# Patient Record
Sex: MALE | Employment: OTHER | URBAN - METROPOLITAN AREA
[De-identification: names, ages, dates, MRNs, and addresses within clinical notes are randomized per-mention and may not be internally consistent; named-entity substitution may affect disease eponyms.]

---

## 2017-05-19 ENCOUNTER — TELEPHONE (OUTPATIENT)
Dept: INTERNAL MEDICINE | Facility: CLINIC | Age: 72
End: 2017-05-19

## 2017-05-19 NOTE — TELEPHONE ENCOUNTER
5/19/2017    Call Regarding Preventive Health Screening Colonoscopy    Attempt 1    Message on voicemail     Comments:       Outreach   cnt

## 2017-05-23 ENCOUNTER — TELEPHONE (OUTPATIENT)
Dept: INTERNAL MEDICINE | Facility: CLINIC | Age: 72
End: 2017-05-23

## 2017-05-23 NOTE — TELEPHONE ENCOUNTER
Panel Management Review      Patient has the following on his problem list:     Diabetes    ASA: Passed    Last A1C  Lab Results   Component Value Date    A1C 8.5 12/14/2016    A1C 8.0 05/25/2016    A1C 8.3 02/16/2016    A1C 7.9 12/23/2014    A1C 8.4 05/17/2014     A1C tested: FAILED    Last LDL:    Lab Results   Component Value Date    CHOL 133 02/16/2016     Lab Results   Component Value Date    HDL 33 02/16/2016     Lab Results   Component Value Date    LDL 72 02/16/2016     Lab Results   Component Value Date    TRIG 142 02/16/2016     Lab Results   Component Value Date    CHOLHDLRATIO 4.4 11/25/2005     Lab Results   Component Value Date    NHDL 100 02/16/2016       Is the patient on a Statin? YES             Is the patient on Aspirin? YES    Medications     HMG CoA Reductase Inhibitors    atorvastatin (LIPITOR) 40 MG tablet    Salicylates    aspirin EC 81 MG EC tablet          Last three blood pressure readings:  BP Readings from Last 3 Encounters:   12/14/16 122/70   09/09/16 120/84   06/15/16 126/76       Date of last diabetes office visit: 12/14/16     Tobacco History:     History   Smoking Status     Never Smoker   Smokeless Tobacco     Never Used         Hypertension   Last three blood pressure readings:  BP Readings from Last 3 Encounters:   12/14/16 122/70   09/09/16 120/84   06/15/16 126/76     Blood pressure: Passed    HTN Guidelines:  Age 18-59 BP range:  Less than 140/90  Age 60-85 with Diabetes:  Less than 140/90  Age 60-85 without Diabetes:  less than 150/90      Composite cancer screening  Chart review shows that this patient is due/due soon for the following Colonoscopy  Summary:    Patient is due/failing the following:   A1C, BP CHECK and COLONOSCOPY    Action needed:   Patient needs office visit for Diabetes, BP check.    Type of outreach:    Sent letter. Phone number non Available, this is the second attempt ( another encounter previously for Panel Management)     Questions for provider  review:    None                                                                                                                                    Antonina Gold MA       Chart routed to none .

## 2017-05-23 NOTE — LETTER
[facility]  [address], [address]  [place]  [address]      May 23, 2017    Adal Bates                                                                                                                                                       [address] [facility] [id number]            Dear Adal,        At [facility] we care about your health and well-being. In order to ensure we are providing the best quality care, we have reviewed your chart and see that you are due for:    1. Diabetes, blood pressure and medication recheck   2. Colonoscopy       Please call [phone] to schedule an appointment      GI : [phone] to schedule Colonoscopy             Sincerely,      Alvaro Lindo M.D.

## 2017-11-30 DIAGNOSIS — I25.5 ISCHEMIC CARDIOMYOPATHY: ICD-10-CM

## 2017-11-30 DIAGNOSIS — Z95.1 S/P CABG (CORONARY ARTERY BYPASS GRAFT): ICD-10-CM

## 2017-12-04 RX ORDER — LOSARTAN POTASSIUM 50 MG/1
TABLET ORAL
Qty: 90 TABLET | Refills: 0 | Status: SHIPPED | OUTPATIENT
Start: 2017-12-04 | End: 2018-03-04

## 2017-12-04 RX ORDER — ATORVASTATIN CALCIUM 40 MG/1
TABLET, FILM COATED ORAL
Qty: 90 TABLET | Refills: 0 | Status: SHIPPED | OUTPATIENT
Start: 2017-12-04 | End: 2018-03-04

## 2017-12-04 RX ORDER — METOPROLOL SUCCINATE 25 MG/1
TABLET, EXTENDED RELEASE ORAL
Qty: 270 TABLET | Refills: 0 | Status: SHIPPED | OUTPATIENT
Start: 2017-12-04 | End: 2018-03-04

## 2017-12-09 DIAGNOSIS — E11.8 TYPE 2 DIABETES MELLITUS WITH COMPLICATION, WITHOUT LONG-TERM CURRENT USE OF INSULIN (H): ICD-10-CM

## 2017-12-12 RX ORDER — GLIPIZIDE 10 MG/1
TABLET, FILM COATED, EXTENDED RELEASE ORAL
Qty: 90 TABLET | Refills: 0 | Status: SHIPPED | OUTPATIENT
Start: 2017-12-12 | End: 2018-03-11

## 2017-12-12 RX ORDER — METFORMIN HCL 500 MG
TABLET, EXTENDED RELEASE 24 HR ORAL
Qty: 360 TABLET | Refills: 0 | Status: SHIPPED | OUTPATIENT
Start: 2017-12-12 | End: 2018-03-11

## 2017-12-13 ENCOUNTER — OFFICE VISIT (OUTPATIENT)
Dept: INTERNAL MEDICINE | Facility: CLINIC | Age: 72
End: 2017-12-13
Payer: MEDICARE

## 2017-12-13 VITALS
HEIGHT: 70 IN | BODY MASS INDEX: 23.62 KG/M2 | SYSTOLIC BLOOD PRESSURE: 128 MMHG | WEIGHT: 165 LBS | TEMPERATURE: 67 F | HEART RATE: 67 BPM | DIASTOLIC BLOOD PRESSURE: 78 MMHG | OXYGEN SATURATION: 97 %

## 2017-12-13 DIAGNOSIS — Z12.11 SPECIAL SCREENING FOR MALIGNANT NEOPLASMS, COLON: ICD-10-CM

## 2017-12-13 DIAGNOSIS — Z12.5 SCREENING FOR PROSTATE CANCER: ICD-10-CM

## 2017-12-13 DIAGNOSIS — E11.9 TYPE 2 DIABETES MELLITUS WITHOUT COMPLICATION, WITHOUT LONG-TERM CURRENT USE OF INSULIN (H): ICD-10-CM

## 2017-12-13 DIAGNOSIS — I25.10 CORONARY ARTERY DISEASE INVOLVING NATIVE CORONARY ARTERY OF NATIVE HEART WITHOUT ANGINA PECTORIS: ICD-10-CM

## 2017-12-13 DIAGNOSIS — Z00.00 ROUTINE GENERAL MEDICAL EXAMINATION AT A HEALTH CARE FACILITY: Primary | ICD-10-CM

## 2017-12-13 DIAGNOSIS — E78.5 HYPERLIPIDEMIA WITH TARGET LDL LESS THAN 70: ICD-10-CM

## 2017-12-13 PROCEDURE — G0439 PPPS, SUBSEQ VISIT: HCPCS | Performed by: INTERNAL MEDICINE

## 2017-12-13 NOTE — MR AVS SNAPSHOT
After Visit Summary   12/13/2017    Adal Bates    MRN: [id number]           Patient Information     Date Of Birth          1945        Visit Information        Provider Department      12/13/2017 3:40 PM Alvaro Lindo MD [facility]        Today's Diagnoses     Routine general medical examination at a health care facility    -  1    Special screening for malignant neoplasms, colon        Screening for prostate cancer        Type 2 diabetes mellitus without complication, without long-term current use of insulin (H)        Hyperlipidemia with target LDL less than 70        Coronary artery disease involving native coronary artery of native heart without angina pectoris          Care Instructions      Preventive Health Recommendations:   Male Ages 65 and over    Yearly exam:             See your health care provider every year in order to  o   Review health changes.   o   Discuss preventive care.    o   Review your medicines if your doctor has prescribed any.    Talk with your health care provider about whether you should have a test to screen for prostate cancer (PSA).    Every 3 years, have a diabetes test (fasting glucose). If you are at risk for diabetes, you should have this test more often.    Every 5 years, have a cholesterol test. Have this test more often if you are at risk for high cholesterol or heart disease.     Every 10 years, have a colonoscopy. Or, have a yearly FIT test (stool test). These exams will check for colon cancer.    Talk to with your health care provider about screening for Abdominal Aortic Aneurysm if you have a family history of AAA or have a history of smoking.    Shots:     Get a flu shot each year.     Get a tetanus shot every 10 years.     Talk to your doctor about your pneumonia vaccines. There are now two you should receive - Pneumovax (PPSV 23) and Prevnar (PCV 13).     Talk to your doctor about a shingles vaccine.     Talk to your doctor  about the hepatitis B vaccine.  Nutrition:     Eat at least 5 servings of fruits and vegetables each day.     Eat whole-grain bread, whole-wheat pasta and brown rice instead of white grains and rice.     Talk to your provider about Calcium and Vitamin D.   Lifestyle    Exercise for at least 150 minutes a week (30 minutes a day, 5 days a week). This will help you control your weight and prevent disease.     Limit alcohol to one drink per day.     No smoking.     Wear sunscreen to prevent skin cancer.     See your dentist every six months for an exam and cleaning.     See your eye doctor every 1 to 2 years to screen for conditions such as glaucoma, macular degeneration, cataracts, etc   Preventive Health Recommendations:       Male Ages 65 and over    Yearly exam:             See your health care provider every year in order to  o   Review health changes.   o   Discuss preventive care.    o   Review your medicines if your doctor has prescribed any.  Talk with your health care provider about whether you should have a test to screen for prostate cancer (PSA).  Every 3 years, have a diabetes test (fasting glucose). If you are at risk for diabetes, you should have this test more often.  Every 5 years, have a cholesterol test. Have this test more often if you are at risk for high cholesterol or heart disease.   Every 10 years, have a colonoscopy. Or, have a yearly FIT test (stool test). These exams will check for colon cancer.  Talk to with your health care provider about screening for Abdominal Aortic Aneurysm if you have a family history of AAA or have a history of smoking.  Shots:   Get a flu shot each year.   Get a tetanus shot every 10 years.   Talk to your doctor about your pneumonia vaccines. There are now two you should receive - Pneumovax (PPSV 23) and Prevnar (PCV 13).  Talk to your doctor about a shingles vaccine.   Talk to your doctor about the hepatitis B vaccine.  Nutrition:   Eat at least 5 servings of  fruits and vegetables each day.   Eat whole-grain bread, whole-wheat pasta and brown rice instead of white grains and rice.   Talk to your doctor about Calcium and Vitamin D.   Lifestyle  Exercise for at least 150 minutes a week (30 minutes a day, 5 days a week). This will help you control your weight and prevent disease.   Limit alcohol to one drink per day.   No smoking.   Wear sunscreen to prevent skin cancer.   See your dentist every six months for an exam and cleaning.   See your eye doctor every 1 to 2 years to screen for conditions such as glaucoma, macular degeneration and cataracts.          Follow-ups after your visit        Additional Services     GASTROENTEROLOGY ADULT REF PROCEDURE ONLY       Last Lab Result: Creatinine (mg/dL)       Date                     Value                 12/14/2016               1.29 (H)         ----------  Body mass index is 23.68 kg/(m^2).     Needed:  No  Language:  English    Patient will be contacted to schedule procedure.     Please be aware that coverage of these services is subject to the terms and limitations of your health insurance plan.  Call member services at your health plan with any benefit or coverage questions.  Any procedures must be performed at a [facility] facility OR coordinated by your clinic's referral office.    Please bring the following with you to your appointment:    (1) Any X-Rays, CTs or MRIs which have been performed.  Contact the facility where they were done to arrange for  prior to your scheduled appointment.    (2) List of current medications   (3) This referral request   (4) Any documents/labs given to you for this referral                  Future tests that were ordered for you today     Open Future Orders        Priority Expected Expires Ordered    CBC with platelets Routine  12/31/2017 12/13/2017    Comprehensive metabolic panel Routine  12/31/2017 12/13/2017    Lipid panel reflex to direct LDL Fasting Routine  12/31/2017  "2017    TSH with free T4 reflex Routine  2017    Prostate spec antigen screen Routine  2017    *UA reflex to Microscopic Routine  2017    Hemoglobin A1c Routine  2017    Albumin Random Urine Quantitative with Creat Ratio Routine  2017            Who to contact     If you have questions or need follow up information about today's clinic visit or your schedule please contact [facility] directly at [phone].  Normal or non-critical lab and imaging results will be communicated to you by Backplanehart, letter or phone within 4 business days after the clinic has received the results. If you do not hear from us within 7 days, please contact the clinic through [employer] or phone. If you have a critical or abnormal lab result, we will notify you by phone as soon as possible.  Submit refill requests through LSEO or call your pharmacy and they will forward the refill request to us. Please allow 3 business days for your refill to be completed.          Additional Information About Your Visit        Backplanehart Information     LSEO lets you send messages to your doctor, view your test results, renew your prescriptions, schedule appointments and more. To sign up, go to [email or web address] . Click on \"Log in\" on the left side of the screen, which will take you to the Welcome page. Then click on \"Sign up Now\" on the right side of the page.     You will be asked to enter the access code listed below, as well as some personal information. Please follow the directions to create your username and password.     Your access code is: [email or web address]  Expires: 2017  9:13 AM     Your access code will  in 90 days. If you need help or a new code, please call your [facility] or [phone].        Care EveryWhere ID     This is your Care EveryWhere ID. This could be used by other organizations to access your [facility] " "medical records  [id number]        Your Vitals Were     Pulse Temperature Height Pulse Oximetry BMI (Body Mass Index)       67 67  F (19.4  C) (Oral) 5' 10\" (1.778 m) 97% 23.68 kg/m2        Blood Pressure from Last 3 Encounters:   12/13/17 128/78   12/14/16 122/70   09/09/16 120/84    Weight from Last 3 Encounters:   12/13/17 165 lb (74.8 kg)   12/14/16 164 lb (74.4 kg)   09/09/16 164 lb 4.8 oz (74.5 kg)              We Performed the Following     GASTROENTEROLOGY ADULT REF PROCEDURE ONLY        Primary Care Provider Office Phone # Fax #    Alvaro Lindo [phone]       [address] [facility] [address]        Equal Access to Services     [facility]: Hadii aad ku hadasho Soomaali, waaxda luqadaha, qaybta kaalmada adeegyada, german mcraein hayaan adewil dia . So [facility] [phone].    ATENCIÓN: Si habla español, tiene a nolasco disposición servicios gratuitos de asistencia lingüística. Llame al [phone].    We comply with applicable federal civil rights laws and [place] laws. We do not discriminate on the basis of race, color, national origin, age, disability, sex, sexual orientation, or gender identity.            Thank you!     Thank you for choosing [facility]  for your care. Our goal is always to provide you with excellent care. Hearing back from our patients is one way we can continue to improve our services. Please take a few minutes to complete the written survey that you may receive in the mail after your visit with us. Thank you!             Your Updated Medication List - Protect others around you: Learn how to safely use, store and throw away your medicines at www.disposemymeds.org.          This list is accurate as of: 12/13/17  4:18 PM.  Always use your most recent med list.                   Brand Name Dispense Instructions for use Diagnosis    ACCU-CHEK COMPLETE Kit     1 kit    1 kit daily    Type 2 diabetes, HbA1C goal < 8% (H)       aspirin " 81 MG EC tablet     90 tablet    Take 1 tablet (81 mg) by mouth daily    S/P CABG (coronary artery bypass graft)       atorvastatin 40 MG tablet    LIPITOR    90 tablet    TAKE 1 TABLET DAILY    Ischemic cardiomyopathy       COENZYME Q-10 PO      Take 200 mg by mouth daily        glipiZIDE 10 MG 24 hr tablet    GLUCOTROL XL    90 tablet    TAKE 1 TABLET DAILY    Type 2 diabetes mellitus with complication, without long-term current use of insulin (H)       losartan 50 MG tablet    COZAAR    90 tablet    TAKE 1 TABLET DAILY    S/P CABG (coronary artery bypass graft)       metFORMIN 500 MG 24 hr tablet    GLUCOPHAGE-XR    360 tablet    TAKE 2 TABLETS TWICE A DAY WITH MEALS    Type 2 diabetes mellitus with complication, without long-term current use of insulin (H)       metoprolol 25 MG 24 hr tablet    TOPROL-XL    270 tablet    TAKE ONE AND ONE-HALF TABLETS TWICE A DAY    S/P CABG (coronary artery bypass graft)       multivitamin, therapeutic with minerals Tabs tablet      Take 1 tablet by mouth daily        omega 3 1000 MG Caps     90 capsule    Take 1 g by mouth daily        vitamin D 2000 UNITS tablet     100 tablet    Take 2,000 Units by mouth daily

## 2017-12-13 NOTE — PROGRESS NOTES
SUBJECTIVE:   Adal Bates is a 72 year old male who presents for Preventive Visit.      Are you in the first 12 months of your Medicare Part B coverage?  No    Healthy Habits:    Do you get at least three servings of calcium containing foods daily (dairy, green leafy vegetables, etc.)? yes    Amount of exercise or daily activities, outside of work: minimal    Problems taking medications regularly No    Medication side effects: No    Have you had an eye exam in the past two years? yes    Do you see a dentist twice per year? yes    Do you have sleep apnea, excessive snoring or daytime drowsiness?no      Ability to successfully perform activities of daily living: Yes, no assistance needed    Home safety:  none identified     Hearing impairment: No    Fall risk:           COGNITIVE SCREEN  1) Repeat 3 items (Banana, Sunrise, Chair)    2) Clock draw: NORMAL  3) 3 item recall: Recalls 2 objects   Results: NORMAL clock, 1-2 items recalled: COGNITIVE IMPAIRMENT LESS LIKELY    Mini-CogTM Copyright S Rossana. Licensed by the author for use in Health system; reprinted with permission (jose c@[facility]). All rights reserved.              PROBLEMS TO ADD ON...    Has h/o ischemic heart disease, asymptomatic regarding chest pains, SOB,palpitations. Has good compliance with treatment, diet and exercise.  Had CABG last year. No symptoms of CHF, angina. Needs cardiology follow up.   Has H/O hyperlipidemia. On medical treatment and diet. No side effects. No muscle weakness, myalgias or upset stomach.   Has H/O DM. On diet , exercise and PO Metformin. Blood sugars are controlled. No parestesias. No hypoglycemias.      Reviewed and updated as needed this visit by clinical staffTobacco  Allergies  Meds  Med Hx  Surg Hx  Fam Hx  Soc Hx        Reviewed and updated as needed this visit by Provider        Social History   Substance Use Topics     Smoking status: Never Smoker     Smokeless tobacco: Never Used     Alcohol use  0.0 oz/week     0 Standard drinks or equivalent per week      Comment: social       If you drink alcohol do you typically have >3 drinks per day or >7 drinks per week? No                        Today's PHQ-2 Score: 0  PHQ-2 ( 1999 Pfizer) 12/13/2017 12/14/2016   Q1: Little interest or pleasure in doing things 0 0   Q2: Feeling down, depressed or hopeless 0 0   PHQ-2 Score 0 0         Do you feel safe in your environment - Yes    Do you have a Health Care Directive?: No: Advance care planning was reviewed with patient; patient declined at this time.      Current providers sharing in care for this patient include: Patient Care Team:  Alvaro Lindo MD as PCP - General (Internal Medicine)  Hector Hall MD as MD (Cardiology)    The following health maintenance items are reviewed in Epic and correct as of today:  Health Maintenance   Topic Date Due     COLON CANCER SCREEN (SYSTEM ASSIGNED)  10/23/1994     ADVANCE DIRECTIVE PLANNING Q5 YRS  10/23/1999     AORTIC ANEURYSM SCREENING (SYSTEM ASSIGNED)  10/23/2009     EYE EXAM Q1 YEAR  03/01/2016     LIPID MONITORING Q1 YEAR  02/16/2017     PNEUMOCOCCAL (2 of 2 - PPSV23) 02/22/2017     A1C Q6 MO  06/14/2017     INFLUENZA VACCINE (SYSTEM ASSIGNED)  09/01/2017     FOOT EXAM Q1 YEAR  12/14/2017     CREATININE Q1 YEAR  12/14/2017     BMP Q1 YR  12/14/2017     HEMOGLOBIN Q1 YR  12/14/2017     FALL RISK ASSESSMENT  12/14/2017     MICROALBUMIN Q1 YEAR  12/14/2017     TETANUS IMMUNIZATION (SYSTEM ASSIGNED)  01/01/2018     TSH W/ FREE T4 REFLEX Q2 YEAR  12/14/2018     Labs reviewed in EPIC        ROS:  C: NEGATIVE for fever, chills, change in weight  I: NEGATIVE for worrisome rashes, moles or lesions  E: NEGATIVE for vision changes or irritation  E/M: NEGATIVE for ear, mouth and throat problems  R: NEGATIVE for significant cough or SOB  B: NEGATIVE for masses, tenderness or discharge  CV: NEGATIVE for chest pain, palpitations or peripheral edema  GI: NEGATIVE for  "nausea, abdominal pain, heartburn, or change in bowel habits  : NEGATIVE for frequency, dysuria, or hematuria  M: NEGATIVE for significant arthralgias or myalgia  N: NEGATIVE for weakness, dizziness or paresthesias  E: NEGATIVE for temperature intolerance, skin/hair changes  H: NEGATIVE for bleeding problems  P: NEGATIVE for changes in mood or affect    OBJECTIVE:   /78  Pulse 67  Temp (!) 67  F (19.4  C) (Oral)  Ht 5' 10\" (1.778 m)  Wt 165 lb (74.8 kg)  SpO2 97%  BMI 23.68 kg/m2 Estimated body mass index is 23.68 kg/(m^2) as calculated from the following:    Height as of this encounter: 5' 10\" (1.778 m).    Weight as of this encounter: 165 lb (74.8 kg).  EXAM:   GENERAL: healthy, alert and no distress  EYES: Eyes grossly normal to inspection, PERRL and conjunctivae and sclerae normal  HENT: ear canals and TM's normal, nose and mouth without ulcers or lesions  NECK: no adenopathy, no asymmetry, masses, or scars and thyroid normal to palpation  RESP: lungs clear to auscultation - no rales, rhonchi or wheezes  CV: regular rate and rhythm, normal S1 S2, no S3 or S4, no murmur, click or rub, no peripheral edema and peripheral pulses strong  ABDOMEN: soft, nontender, no hepatosplenomegaly, no masses and bowel sounds normal  MS: no gross musculoskeletal defects noted, no edema  SKIN: no suspicious lesions or rashes  NEURO: Normal strength and tone, mentation intact and speech normal  PSYCH: mentation appears normal, affect normal/bright    ASSESSMENT / PLAN:       ICD-10-CM    1. Routine general medical examination at a health care facility Z00.00 CBC with platelets     Comprehensive metabolic panel     Lipid panel reflex to direct LDL Fasting     TSH with free T4 reflex     Prostate spec antigen screen     *UA reflex to Microscopic     Hemoglobin A1c     Albumin Random Urine Quantitative with Creat Ratio   2. Special screening for malignant neoplasms, colon Z12.11 GASTROENTEROLOGY ADULT REF PROCEDURE ONLY " "  3. Screening for prostate cancer Z12.5 Prostate spec antigen screen   4. Type 2 diabetes mellitus without complication, without long-term current use of insulin (H) E11.9 *UA reflex to Microscopic     Hemoglobin A1c     Albumin Random Urine Quantitative with Creat Ratio   5. Hyperlipidemia with target LDL less than 70 E78.5 Lipid panel reflex to direct LDL Fasting     TSH with free T4 reflex   6. Coronary artery disease involving native coronary artery of native heart without angina pectoris I25.10 CBC with platelets     Comprehensive metabolic panel     TSH with free T4 reflex       End of Life Planning:  Patient currently has an advanced directive: Yes.  Practitioner is supportive of decision.    COUNSELING:  Reviewed preventive health counseling, as reflected in patient instructions       Regular exercise       Healthy diet/nutrition       Vision screening       Hearing screening       Dental care       Colon cancer screening       Prostate cancer screening        Estimated body mass index is 23.68 kg/(m^2) as calculated from the following:    Height as of this encounter: 5' 10\" (1.778 m).    Weight as of this encounter: 165 lb (74.8 kg).       reports that he has never smoked. He has never used smokeless tobacco.        Appropriate preventive services were discussed with this patient, including applicable screening as appropriate for cardiovascular disease, diabetes, osteopenia/osteoporosis, and glaucoma.  As appropriate for age/gender, discussed screening for colorectal cancer, prostate cancer, breast cancer, and cervical cancer. Checklist reviewing preventive services available has been given to the patient.    Reviewed patients plan of care and provided an AVS. The Intermediate Care Plan ( asthma action plan, low back pain action plan, and migraine action plan) for Adal meets the Care Plan requirement. This Care Plan has been established and reviewed with the Patient.    Counseling Resources:  ATP IV " Guidelines  Pooled Cohorts Equation Calculator  Breast Cancer Risk Calculator  FRAX Risk Assessment  ICSI Preventive Guidelines  Dietary Guidelines for Americans, 2010  [place] [employer]'s MyPlate  ASA Prophylaxis  Lung CA Screening    Alvaro Lindo MD  [facility]

## 2017-12-13 NOTE — NURSING NOTE
"Chief Complaint   Patient presents with     Physical     Px and F/U on medications       Initial /78  Pulse 67  Temp (!) 67  F (19.4  C) (Oral)  Ht 5' 10\" (1.778 m)  Wt 165 lb (74.8 kg)  SpO2 97%  BMI 23.68 kg/m2 Estimated body mass index is 23.68 kg/(m^2) as calculated from the following:    Height as of this encounter: 5' 10\" (1.778 m).    Weight as of this encounter: 165 lb (74.8 kg).  Medication Reconciliation: complete   Antonina Gold MA      "
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
## 2017-12-14 DIAGNOSIS — R82.90 NONSPECIFIC FINDING ON EXAMINATION OF URINE: Primary | ICD-10-CM

## 2017-12-14 DIAGNOSIS — N39.0 URINARY TRACT INFECTION WITHOUT HEMATURIA, SITE UNSPECIFIED: ICD-10-CM

## 2017-12-14 DIAGNOSIS — I25.10 CORONARY ARTERY DISEASE INVOLVING NATIVE CORONARY ARTERY OF NATIVE HEART WITHOUT ANGINA PECTORIS: ICD-10-CM

## 2017-12-14 DIAGNOSIS — E78.5 HYPERLIPIDEMIA WITH TARGET LDL LESS THAN 70: ICD-10-CM

## 2017-12-14 DIAGNOSIS — Z12.5 SCREENING FOR PROSTATE CANCER: ICD-10-CM

## 2017-12-14 DIAGNOSIS — E11.9 TYPE 2 DIABETES MELLITUS WITHOUT COMPLICATION, WITHOUT LONG-TERM CURRENT USE OF INSULIN (H): ICD-10-CM

## 2017-12-14 DIAGNOSIS — Z00.00 ROUTINE GENERAL MEDICAL EXAMINATION AT A HEALTH CARE FACILITY: ICD-10-CM

## 2017-12-14 LAB
ALBUMIN UR-MCNC: 30 MG/DL
APPEARANCE UR: ABNORMAL
BILIRUB UR QL STRIP: ABNORMAL
COLOR UR AUTO: YELLOW
ERYTHROCYTE [DISTWIDTH] IN BLOOD BY AUTOMATED COUNT: 15.7 % (ref 10–15)
GLUCOSE UR STRIP-MCNC: NEGATIVE MG/DL
HBA1C MFR BLD: 7.9 % (ref 4.3–6)
HCT VFR BLD AUTO: 46 % (ref 40–53)
HGB BLD-MCNC: 14.3 G/DL (ref 13.3–17.7)
HGB UR QL STRIP: ABNORMAL
KETONES UR STRIP-MCNC: NEGATIVE MG/DL
LEUKOCYTE ESTERASE UR QL STRIP: ABNORMAL
MCH RBC QN AUTO: 26.6 PG (ref 26.5–33)
MCHC RBC AUTO-ENTMCNC: 31.1 G/DL (ref 31.5–36.5)
MCV RBC AUTO: 86 FL (ref 78–100)
NITRATE UR QL: NEGATIVE
PH UR STRIP: 5.5 PH (ref 5–7)
PLATELET # BLD AUTO: 190 10E9/L (ref 150–450)
RBC # BLD AUTO: 5.37 10E12/L (ref 4.4–5.9)
RBC #/AREA URNS AUTO: ABNORMAL /HPF
SOURCE: ABNORMAL
SP GR UR STRIP: 1.01 (ref 1–1.03)
UROBILINOGEN UR STRIP-ACNC: 0.2 EU/DL (ref 0.2–1)
WBC # BLD AUTO: 7.7 10E9/L (ref 4–11)
WBC #/AREA URNS AUTO: >100 /HPF

## 2017-12-14 PROCEDURE — G0103 PSA SCREENING: HCPCS | Performed by: INTERNAL MEDICINE

## 2017-12-14 PROCEDURE — 80053 COMPREHEN METABOLIC PANEL: CPT | Performed by: INTERNAL MEDICINE

## 2017-12-14 PROCEDURE — 87086 URINE CULTURE/COLONY COUNT: CPT | Performed by: INTERNAL MEDICINE

## 2017-12-14 PROCEDURE — 80061 LIPID PANEL: CPT | Performed by: INTERNAL MEDICINE

## 2017-12-14 PROCEDURE — 36415 COLL VENOUS BLD VENIPUNCTURE: CPT | Performed by: INTERNAL MEDICINE

## 2017-12-14 PROCEDURE — 84439 ASSAY OF FREE THYROXINE: CPT | Performed by: INTERNAL MEDICINE

## 2017-12-14 PROCEDURE — 85027 COMPLETE CBC AUTOMATED: CPT | Performed by: INTERNAL MEDICINE

## 2017-12-14 PROCEDURE — 87186 SC STD MICRODIL/AGAR DIL: CPT | Performed by: INTERNAL MEDICINE

## 2017-12-14 PROCEDURE — 82043 UR ALBUMIN QUANTITATIVE: CPT | Performed by: INTERNAL MEDICINE

## 2017-12-14 PROCEDURE — 84443 ASSAY THYROID STIM HORMONE: CPT | Performed by: INTERNAL MEDICINE

## 2017-12-14 PROCEDURE — 87088 URINE BACTERIA CULTURE: CPT | Performed by: INTERNAL MEDICINE

## 2017-12-14 PROCEDURE — 83036 HEMOGLOBIN GLYCOSYLATED A1C: CPT | Performed by: INTERNAL MEDICINE

## 2017-12-14 PROCEDURE — 81001 URINALYSIS AUTO W/SCOPE: CPT | Performed by: INTERNAL MEDICINE

## 2017-12-15 LAB
ALBUMIN SERPL-MCNC: 3.7 G/DL (ref 3.4–5)
ALP SERPL-CCNC: 79 U/L (ref 40–150)
ALT SERPL W P-5'-P-CCNC: 25 U/L (ref 0–70)
ANION GAP SERPL CALCULATED.3IONS-SCNC: 7 MMOL/L (ref 3–14)
AST SERPL W P-5'-P-CCNC: 15 U/L (ref 0–45)
BILIRUB SERPL-MCNC: 1.3 MG/DL (ref 0.2–1.3)
BUN SERPL-MCNC: 16 MG/DL (ref 7–30)
CALCIUM SERPL-MCNC: 9.3 MG/DL (ref 8.5–10.1)
CHLORIDE SERPL-SCNC: 108 MMOL/L (ref 94–109)
CHOLEST SERPL-MCNC: 86 MG/DL
CO2 SERPL-SCNC: 26 MMOL/L (ref 20–32)
CREAT SERPL-MCNC: 1.34 MG/DL (ref 0.66–1.25)
CREAT UR-MCNC: 113 MG/DL
GFR SERPL CREATININE-BSD FRML MDRD: 52 ML/MIN/1.7M2
GLUCOSE SERPL-MCNC: 98 MG/DL (ref 70–99)
HDLC SERPL-MCNC: 36 MG/DL
LDLC SERPL CALC-MCNC: 39 MG/DL
MICROALBUMIN UR-MCNC: 151 MG/L
MICROALBUMIN/CREAT UR: 133.63 MG/G CR (ref 0–17)
NONHDLC SERPL-MCNC: 50 MG/DL
POTASSIUM SERPL-SCNC: 4.7 MMOL/L (ref 3.4–5.3)
PROT SERPL-MCNC: 7.5 G/DL (ref 6.8–8.8)
PSA SERPL-ACNC: 3.42 UG/L (ref 0–4)
SODIUM SERPL-SCNC: 141 MMOL/L (ref 133–144)
T4 FREE SERPL-MCNC: 1.24 NG/DL (ref 0.76–1.46)
TRIGL SERPL-MCNC: 54 MG/DL
TSH SERPL DL<=0.005 MIU/L-ACNC: 4.31 MU/L (ref 0.4–4)

## 2017-12-17 LAB
BACTERIA SPEC CULT: ABNORMAL
SPECIMEN SOURCE: ABNORMAL

## 2017-12-18 RX ORDER — AMPICILLIN TRIHYDRATE 500 MG
500 CAPSULE ORAL 3 TIMES DAILY
Qty: 21 CAPSULE | Refills: 0 | Status: SHIPPED | OUTPATIENT
Start: 2017-12-18 | End: 2018-02-27

## 2018-02-27 ENCOUNTER — OFFICE VISIT (OUTPATIENT)
Dept: INTERNAL MEDICINE | Facility: CLINIC | Age: 73
End: 2018-02-27
Payer: MEDICARE

## 2018-02-27 VITALS
SYSTOLIC BLOOD PRESSURE: 120 MMHG | WEIGHT: 166.6 LBS | BODY MASS INDEX: 23.9 KG/M2 | DIASTOLIC BLOOD PRESSURE: 88 MMHG | HEART RATE: 88 BPM | TEMPERATURE: 97.7 F | OXYGEN SATURATION: 99 %

## 2018-02-27 DIAGNOSIS — R30.0 DYSURIA: Primary | ICD-10-CM

## 2018-02-27 DIAGNOSIS — I10 HYPERTENSION GOAL BP (BLOOD PRESSURE) < 140/90: ICD-10-CM

## 2018-02-27 DIAGNOSIS — N39.0 URINARY TRACT INFECTION WITHOUT HEMATURIA, SITE UNSPECIFIED: ICD-10-CM

## 2018-02-27 DIAGNOSIS — E11.9 TYPE 2 DIABETES MELLITUS WITHOUT COMPLICATION, WITHOUT LONG-TERM CURRENT USE OF INSULIN (H): ICD-10-CM

## 2018-02-27 LAB
ALBUMIN UR-MCNC: 30 MG/DL
APPEARANCE UR: ABNORMAL
BACTERIA #/AREA URNS HPF: ABNORMAL /HPF
BILIRUB UR QL STRIP: NEGATIVE
COLOR UR AUTO: YELLOW
GLUCOSE UR STRIP-MCNC: 500 MG/DL
HGB UR QL STRIP: ABNORMAL
KETONES UR STRIP-MCNC: NEGATIVE MG/DL
LEUKOCYTE ESTERASE UR QL STRIP: ABNORMAL
NITRATE UR QL: NEGATIVE
PH UR STRIP: 5.5 PH (ref 5–7)
RBC #/AREA URNS AUTO: ABNORMAL /HPF
SOURCE: ABNORMAL
SP GR UR STRIP: 1.02 (ref 1–1.03)
UROBILINOGEN UR STRIP-ACNC: 0.2 EU/DL (ref 0.2–1)
WBC #/AREA URNS AUTO: >100 /HPF

## 2018-02-27 PROCEDURE — 99213 OFFICE O/P EST LOW 20 MIN: CPT | Performed by: INTERNAL MEDICINE

## 2018-02-27 PROCEDURE — 81001 URINALYSIS AUTO W/SCOPE: CPT | Performed by: INTERNAL MEDICINE

## 2018-02-27 RX ORDER — AMPICILLIN TRIHYDRATE 500 MG
500 CAPSULE ORAL 3 TIMES DAILY
Qty: 21 CAPSULE | Refills: 0 | Status: SHIPPED | OUTPATIENT
Start: 2018-02-27 | End: 2018-10-10
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
## 2018-02-27 NOTE — MR AVS SNAPSHOT
"              After Visit Summary   2018    Adal Bates    MRN: [id number]           Patient Information     Date Of Birth          1945        Visit Information        Provider Department      2018 3:40 PM Alvaro Lindo MD [facility]        Today's Diagnoses     Dysuria    -  1    Type 2 diabetes mellitus without complication, without long-term current use of insulin (H)        Hypertension goal BP (blood pressure) < 140/90           Follow-ups after your visit        Who to contact     If you have questions or need follow up information about today's clinic visit or your schedule please contact [facility] directly at [phone].  Normal or non-critical lab and imaging results will be communicated to you by MyChart, letter or phone within 4 business days after the clinic has received the results. If you do not hear from us within 7 days, please contact the clinic through MyChart or phone. If you have a critical or abnormal lab result, we will notify you by phone as soon as possible.  Submit refill requests through [employer] or call your pharmacy and they will forward the refill request to us. Please allow 3 business days for your refill to be completed.          Additional Information About Your Visit        MyChart Information     [employer] lets you send messages to your doctor, view your test results, renew your prescriptions, schedule appointments and more. To sign up, go to [email or web address] . Click on \"Log in\" on the left side of the screen, which will take you to the Welcome page. Then click on \"Sign up Now\" on the right side of the page.     You will be asked to enter the access code listed below, as well as some personal information. Please follow the directions to create your username and password.     Your access code is: [email or web address]  Expires: 2018  4:29 PM     Your access code will  in 90 days. If you need help or a new code, please " call your [place] clinic or [phone].        Care EveryWhere ID     This is your Care EveryWhere ID. This could be used by other organizations to access your [place] medical records  [id number]        Your Vitals Were     Pulse Temperature Pulse Oximetry BMI (Body Mass Index)          88 97.7  F (36.5  C) (Oral) 99% 23.9 kg/m2         Blood Pressure from Last 3 Encounters:   02/27/18 120/88   12/13/17 128/78   12/14/16 122/70    Weight from Last 3 Encounters:   02/27/18 166 lb 9.6 oz (75.6 kg)   12/13/17 165 lb (74.8 kg)   12/14/16 164 lb (74.4 kg)              We Performed the Following     *UA reflex to Microscopic        Primary Care Provider Office Phone # Fax #    Alvaro Lindo [phone]       [address] [facility] [address]        Equal Access to Services     GLEN BLACK : Hadii aad ku hadasho Soomaali, waaxda luqadaha, qaybta kaalmada adeegyada, waxay thorin haymaricarmenn katelyn dia . So [facility] [phone].    ATENCIÓN: Si habla español, tiene a nolasco disposición servicios gratuitos de asistencia lingüística. Llame al [phone].    We comply with applicable federal civil rights laws and [place] laws. We do not discriminate on the basis of race, color, national origin, age, disability, sex, sexual orientation, or gender identity.            Thank you!     Thank you for choosing [facility]  for your care. Our goal is always to provide you with excellent care. Hearing back from our patients is one way we can continue to improve our services. Please take a few minutes to complete the written survey that you may receive in the mail after your visit with us. Thank you!             Your Updated Medication List - Protect others around you: Learn how to safely use, store and throw away your medicines at www.disposemymeds.org.          This list is accurate as of 2/27/18  4:29 PM.  Always use your most recent med list.                   Brand Name Dispense  Instructions for use Diagnosis    ACCU-CHEK COMPLETE Kit     1 kit    1 kit daily    Type 2 diabetes, HbA1C goal < 8% (H)       ampicillin 500 MG capsule    PRINCIPEN    21 capsule    Take 1 capsule (500 mg) by mouth 3 times daily    Urinary tract infection without hematuria, site unspecified       aspirin 81 MG EC tablet     90 tablet    Take 1 tablet (81 mg) by mouth daily    S/P CABG (coronary artery bypass graft)       atorvastatin 40 MG tablet    LIPITOR    90 tablet    TAKE 1 TABLET DAILY    Ischemic cardiomyopathy       COENZYME Q-10 PO      Take 200 mg by mouth daily        glipiZIDE 10 MG 24 hr tablet    GLUCOTROL XL    90 tablet    TAKE 1 TABLET DAILY    Type 2 diabetes mellitus with complication, without long-term current use of insulin (H)       losartan 50 MG tablet    COZAAR    90 tablet    TAKE 1 TABLET DAILY    S/P CABG (coronary artery bypass graft)       metFORMIN 500 MG 24 hr tablet    GLUCOPHAGE-XR    360 tablet    TAKE 2 TABLETS TWICE A DAY WITH MEALS    Type 2 diabetes mellitus with complication, without long-term current use of insulin (H)       metoprolol succinate 25 MG 24 hr tablet    TOPROL-XL    270 tablet    TAKE ONE AND ONE-HALF TABLETS TWICE A DAY    S/P CABG (coronary artery bypass graft)       multivitamin, therapeutic with minerals Tabs tablet      Take 1 tablet by mouth daily        omega 3 1000 MG Caps     90 capsule    Take 1 g by mouth daily        vitamin D 2000 UNITS tablet     100 tablet    Take 2,000 Units by mouth daily

## 2018-02-27 NOTE — NURSING NOTE
"Chief Complaint   Patient presents with     RECHECK     He needs a different antibiotic for uti.       Initial /88 (BP Location: Left arm, Patient Position: Sitting, Cuff Size: Adult Regular)  Pulse 88  Temp 97.7  F (36.5  C) (Oral)  Wt 166 lb 9.6 oz (75.6 kg)  SpO2 99%  BMI 23.9 kg/m2 Estimated body mass index is 23.9 kg/(m^2) as calculated from the following:    Height as of 12/13/17: 5' 10\" (1.778 m).    Weight as of this encounter: 166 lb 9.6 oz (75.6 kg).  Medication Reconciliation: complete   Catia Lora MA      "

## 2018-03-04 DIAGNOSIS — Z95.1 S/P CABG (CORONARY ARTERY BYPASS GRAFT): ICD-10-CM

## 2018-03-04 DIAGNOSIS — I25.5 ISCHEMIC CARDIOMYOPATHY: ICD-10-CM

## 2018-03-06 RX ORDER — LOSARTAN POTASSIUM 50 MG/1
TABLET ORAL
Qty: 90 TABLET | Refills: 1 | Status: SHIPPED | OUTPATIENT
Start: 2018-03-06 | End: 2018-09-02

## 2018-03-06 RX ORDER — METOPROLOL SUCCINATE 25 MG/1
TABLET, EXTENDED RELEASE ORAL
Qty: 270 TABLET | Refills: 1 | Status: SHIPPED | OUTPATIENT
Start: 2018-03-06 | End: 2018-09-02

## 2018-03-06 RX ORDER — ATORVASTATIN CALCIUM 40 MG/1
TABLET, FILM COATED ORAL
Qty: 90 TABLET | Refills: 1 | Status: SHIPPED | OUTPATIENT
Start: 2018-03-06 | End: 2018-09-02

## 2018-03-07 NOTE — TELEPHONE ENCOUNTER
"Requested Prescriptions   Pending Prescriptions Disp Refills     losartan (COZAAR) 50 MG tablet [Pharmacy Med Name: LOSARTAN TABS 50MG] 90 tablet 0     Sig: TAKE 1 TABLET DAILY    Angiotensin-II Receptors Failed    3/4/2018  4:32 AM       Failed - Normal serum creatinine on file in past 12 months    Recent Labs   Lab Test  12/14/17   0927   CR  1.34*   Per result note: \"Slightly decreased kidney function is stable.\"       Passed - Blood pressure under 140/90 in past 12 months    BP Readings from Last 3 Encounters:   02/27/18 120/88   12/13/17 128/78   12/14/16 122/70          Passed - Recent (12 mo) or future (30 days) visit within the authorizing provider's specialty    Patient had office visit in the last year or has a visit in the next 30 days with authorizing provider.  See \"Patient Info\" tab in inbasket, or \"Choose Columns\" in Meds & Orders section of the refill encounter.   Last OV: 02/27/18       Passed - Patient is age 18 or older       Passed - Normal serum potassium on file in past 12 months    Recent Labs   Lab Test  12/14/17   0927   POTASSIUM  4.7           atorvastatin (LIPITOR) 40 MG tablet [Pharmacy Med Name: ATORVASTATIN TABS 40MG] 90 tablet 0     Sig: TAKE 1 TABLET DAILY    Statins Protocol Passed    3/4/2018  4:32 AM       Passed - LDL on file in past 12 months    Recent Labs   Lab Test  12/14/17   0927   LDL  39          Passed - No abnormal creatine kinase in past 12 months    No lab results found.       Passed - Recent (12 mo) or future (30 days) visit within the authorizing provider's specialty    Patient had office visit in the last year or has a visit in the next 30 days with authorizing provider.  See \"Patient Info\" tab in inbasket, or \"Choose Columns\" in Meds & Orders section of the refill encounter.        Passed - Patient is age 18 or older        metoprolol succinate (TOPROL-XL) 25 MG 24 hr tablet [Pharmacy Med Name: METOPROLOL SUCCINATE ER TABS 25MG] 270 tablet 0     Sig: TAKE ONE AND " "ONE-HALF TABLETS TWICE A DAY    Beta-Blockers Protocol Passed    3/4/2018  4:32 AM       Passed - Blood pressure under 140/90 in past 12 months    BP Readings from Last 3 Encounters:   02/27/18 120/88   12/13/17 128/78   12/14/16 122/70          Passed - Patient is age 6 or older       Passed - Recent (12 mo) or future (30 days) visit within the authorizing provider's specialty    Patient had office visit in the last year or has a visit in the next 30 days with authorizing provider.  See \"Patient Info\" tab in inbasket, or \"Choose Columns\" in Meds & Orders section of the refill encounter.         Prescription approved per [facility].  "

## 2018-03-11 DIAGNOSIS — E11.8 TYPE 2 DIABETES MELLITUS WITH COMPLICATION, WITHOUT LONG-TERM CURRENT USE OF INSULIN (H): ICD-10-CM

## 2018-03-14 RX ORDER — METFORMIN HCL 500 MG
TABLET, EXTENDED RELEASE 24 HR ORAL
Qty: 360 TABLET | Refills: 1 | Status: SHIPPED | OUTPATIENT
Start: 2018-03-14 | End: 2018-09-10

## 2018-03-14 RX ORDER — GLIPIZIDE 10 MG/1
TABLET, FILM COATED, EXTENDED RELEASE ORAL
Qty: 90 TABLET | Refills: 1 | Status: SHIPPED | OUTPATIENT
Start: 2018-03-14 | End: 2018-09-10

## 2018-03-14 NOTE — TELEPHONE ENCOUNTER
"  Last OV 2/27/18. Elevated Creatinine.   Provider approval needed.         Creatinine   Date Value Ref Range Status   12/14/2017 1.34 (H) 0.66 - 1.25 mg/dL Final   ]    Requested Prescriptions   Pending Prescriptions Disp Refills     glipiZIDE (GLUCOTROL XL) 10 MG 24 hr tablet [Pharmacy Med Name: GLIPIZIDE XL TABS 10MG] 90 tablet 0     Sig: TAKE 1 TABLET DAILY    Sulfonylurea Agents Failed    3/11/2018 11:38 PM       Failed - Patient has a recent creatinine (normal) within the past 12 mos.    Recent Labs   Lab Test  12/14/17 0927   CR  1.34*            Passed - Blood pressure less than 140/90 in past 6 months    BP Readings from Last 3 Encounters:   02/27/18 120/88   12/13/17 128/78   12/14/16 122/70                Passed - Patient has documented LDL within the past 12 mos.    Recent Labs   Lab Test  12/14/17 0927   LDL  39            Passed - Patient has had a Microalbumin in the past 12 mos.    Recent Labs   Lab Test  12/14/17 0928 05/17/14   MICROALB   --    --   3.1   MICROL  151   < >   --    UMALCR  133.63*   < >  26    < > = values in this interval not displayed.            Passed - Patient has documented A1c within the specified period of time.    Recent Labs   Lab Test  12/14/17 0927   A1C  7.9*            Passed - Patient is age 18 or older       Passed - Recent (6 mo) or future (30 days) visit within the authorizing provider's specialty    Patient had office visit in the last 6 months or has a visit in the next 30 days with authorizing provider or within the authorizing provider's specialty.  See \"Patient Info\" tab in inbasket, or \"Choose Columns\" in Meds & Orders section of the refill encounter.            metFORMIN (GLUCOPHAGE-XR) 500 MG 24 hr tablet [Pharmacy Med Name: METFORMIN HCL ER TABS 500MG] 360 tablet 0     Sig: TAKE 2 TABLETS TWICE A DAY WITH MEALS    Biguanide Agents Passed    3/11/2018 11:38 PM       Passed - Blood pressure less than 140/90 in past 6 months    BP Readings from Last " "3 Encounters:   02/27/18 120/88   12/13/17 128/78   12/14/16 122/70                Passed - Patient has documented LDL within the past 12 mos.    Recent Labs   Lab Test  12/14/17 0927   LDL  39            Passed - Patient has had a Microalbumin in the past 12 mos.    Recent Labs   Lab Test  12/14/17 0928 05/17/14   MICROALB   --    --   3.1   MICROL  151   < >   --    UMALCR  133.63*   < >  26    < > = values in this interval not displayed.            Passed - Patient is age 10 or older       Passed - Patient has documented A1c within the specified period of time.    Recent Labs   Lab Test  12/14/17 0927   A1C  7.9*            Passed - Patient's CR is NOT>1.4 OR Patient's EGFR is NOT<45 within past 12 mos.    Recent Labs   Lab Test  12/14/17 0927   GFRESTIMATED  52*   GFRESTBLACK  63       Recent Labs   Lab Test  12/14/17 0927   CR  1.34*            Passed - Patient does NOT have a diagnosis of CHF.       Passed - Recent (6 mo) or future (30 days) visit within the authorizing provider's specialty    Patient had office visit in the last 6 months or has a visit in the next 30 days with authorizing provider or within the authorizing provider's specialty.  See \"Patient Info\" tab in inbasket, or \"Choose Columns\" in Meds & Orders section of the refill encounter.              "

## 2018-09-10 DIAGNOSIS — E11.8 TYPE 2 DIABETES MELLITUS WITH COMPLICATION, WITHOUT LONG-TERM CURRENT USE OF INSULIN (H): ICD-10-CM

## 2018-09-11 NOTE — TELEPHONE ENCOUNTER
"Requested Prescriptions   Pending Prescriptions Disp Refills     glipiZIDE (GLUCOTROL XL) 10 MG 24 hr tablet [Pharmacy Med Name: GLIPIZIDE ER TABS 10MG]  Last Written Prescription Date:  3/14/2018  Last Fill Quantity: 90,  # refills: 1   Last office visit: 2/27/2018 with prescribing provider:     Future Office Visit:   Next 5 appointments (look out 90 days)     Oct 10, 2018  9:00 AM CDT   Office Visit with Alvaro Lindo MD   [facility]    [address]  [facility] [place] [address]   [phone]                90 tablet 1     Sig: TAKE 1 TABLET DAILY    Sulfonylurea Agents Failed    9/10/2018  1:20 AM       Failed - Blood pressure less than 140/90 in past 6 months    BP Readings from Last 3 Encounters:   02/27/18 120/88   12/13/17 128/78   12/14/16 122/70                Failed - Patient has documented A1c within the specified period of time.    If HgbA1C is 8 or greater, it needs to be on file within the past 3 months.  If less than 8, must be on file within the past 6 months.     Recent Labs   Lab Test  12/14/17 0927   A1C  7.9*            Failed - Patient has a recent creatinine (normal) within the past 12 mos.    Recent Labs   Lab Test  12/14/17 0927   CR  1.34*            Failed - Recent (6 mo) or future (30 days) visit within the authorizing provider's specialty    Patient had office visit in the last 6 months or has a visit in the next 30 days with authorizing provider or within the authorizing provider's specialty.  See \"Patient Info\" tab in inbasket, or \"Choose Columns\" in Meds & Orders section of the refill encounter.           Passed - Patient has documented LDL within the past 12 mos.    Recent Labs   Lab Test  12/14/17 0927   LDL  39            Passed - Patient has had a Microalbumin in the past 12 mos.    Recent Labs   Lab Test  12/14/17 0928 05/17/14   MICROALB   --    --   3.1   MICROL  151   < >   --    UMALCR  133.63*   < >  26    < " "> = values in this interval not displayed.            Passed - Patient is age 18 or older        metFORMIN (GLUCOPHAGE-XR) 500 MG 24 hr tablet [Pharmacy Med Name: METFORMIN HCL ER TABS 500MG]  Last Written Prescription Date:  3/14/2018  Last Fill Quantity: 360,  # refills: 1  Last office visit: 2/27/2018 with prescribing provider:     Future Office Visit:   Next 5 appointments (look out 90 days)     Oct 10, 2018  9:00 AM CDT   Office Visit with Alvaro Lindo MD   [facility]    [address]  [facility] [address]   [phone]                360 tablet 1     Sig: TAKE 2 TABLETS TWICE A DAY WITH MEALS    Biguanide Agents Failed    9/10/2018  1:20 AM       Failed - Blood pressure less than 140/90 in past 6 months    BP Readings from Last 3 Encounters:   02/27/18 120/88   12/13/17 128/78   12/14/16 122/70                Failed - Patient has documented A1c within the specified period of time.    If HgbA1C is 8 or greater, it needs to be on file within the past 3 months.  If less than 8, must be on file within the past 6 months.     Recent Labs   Lab Test  12/14/17 0927   A1C  7.9*            Failed - Recent (6 mo) or future (30 days) visit within the authorizing provider's specialty    Patient had office visit in the last 6 months or has a visit in the next 30 days with authorizing provider or within the authorizing provider's specialty.  See \"Patient Info\" tab in inbasket, or \"Choose Columns\" in Meds & Orders section of the refill encounter.           Passed - Patient has documented LDL within the past 12 mos.    Recent Labs   Lab Test  12/14/17 0927   LDL  39            Passed - Patient has had a Microalbumin in the past 15 mos.    Recent Labs   Lab Test  12/14/17 0928 05/17/14   MICROALB   --    --   3.1   MICROL  151   < >   --    UMALCR  133.63*   < >  26    < > = values in this interval not displayed.            Passed - Patient is age 10 or " older       Passed - Patient's CR is NOT>1.4 OR Patient's EGFR is NOT<45 within past 12 mos.    Recent Labs   Lab Test  12/14/17 0927   GFRESTIMATED  52*   GFRESTBLACK  63       Recent Labs   Lab Test  12/14/17 0927   CR  1.34*            Passed - Patient does NOT have a diagnosis of CHF.

## 2018-09-12 RX ORDER — GLIPIZIDE 10 MG/1
TABLET, FILM COATED, EXTENDED RELEASE ORAL
Qty: 90 TABLET | Refills: 0 | Status: SHIPPED | OUTPATIENT
Start: 2018-09-12 | End: 2018-10-10

## 2018-09-12 RX ORDER — METFORMIN HCL 500 MG
TABLET, EXTENDED RELEASE 24 HR ORAL
Qty: 360 TABLET | Refills: 0 | Status: SHIPPED | OUTPATIENT
Start: 2018-09-12 | End: 2018-10-10

## 2018-10-10 ENCOUNTER — OFFICE VISIT (OUTPATIENT)
Dept: INTERNAL MEDICINE | Facility: CLINIC | Age: 73
End: 2018-10-10
Payer: MEDICARE

## 2018-10-10 VITALS
HEIGHT: 70 IN | BODY MASS INDEX: 24.14 KG/M2 | HEART RATE: 74 BPM | OXYGEN SATURATION: 98 % | DIASTOLIC BLOOD PRESSURE: 92 MMHG | WEIGHT: 168.6 LBS | RESPIRATION RATE: 24 BRPM | SYSTOLIC BLOOD PRESSURE: 130 MMHG | TEMPERATURE: 97.9 F

## 2018-10-10 DIAGNOSIS — I25.5 ISCHEMIC CARDIOMYOPATHY: ICD-10-CM

## 2018-10-10 DIAGNOSIS — Z95.1 S/P CABG (CORONARY ARTERY BYPASS GRAFT): ICD-10-CM

## 2018-10-10 DIAGNOSIS — E11.8 TYPE 2 DIABETES MELLITUS WITH COMPLICATION, WITHOUT LONG-TERM CURRENT USE OF INSULIN (H): ICD-10-CM

## 2018-10-10 DIAGNOSIS — Z23 NEED FOR PROPHYLACTIC VACCINATION AND INOCULATION AGAINST INFLUENZA: Primary | ICD-10-CM

## 2018-10-10 DIAGNOSIS — I10 BENIGN ESSENTIAL HYPERTENSION: ICD-10-CM

## 2018-10-10 DIAGNOSIS — Z12.11 SPECIAL SCREENING FOR MALIGNANT NEOPLASMS, COLON: ICD-10-CM

## 2018-10-10 DIAGNOSIS — Z23 TETANUS-DIPHTHERIA (TD) VACCINATION: ICD-10-CM

## 2018-10-10 LAB
ERYTHROCYTE [DISTWIDTH] IN BLOOD BY AUTOMATED COUNT: 15.4 % (ref 10–15)
HBA1C MFR BLD: 7.9 % (ref 0–5.6)
HCT VFR BLD AUTO: 45.3 % (ref 40–53)
HGB BLD-MCNC: 14.1 G/DL (ref 13.3–17.7)
MCH RBC QN AUTO: 27.6 PG (ref 26.5–33)
MCHC RBC AUTO-ENTMCNC: 31.1 G/DL (ref 31.5–36.5)
MCV RBC AUTO: 89 FL (ref 78–100)
PLATELET # BLD AUTO: 200 10E9/L (ref 150–450)
RBC # BLD AUTO: 5.11 10E12/L (ref 4.4–5.9)
WBC # BLD AUTO: 7.2 10E9/L (ref 4–11)

## 2018-10-10 PROCEDURE — G0008 ADMIN INFLUENZA VIRUS VAC: HCPCS | Mod: 59 | Performed by: INTERNAL MEDICINE

## 2018-10-10 PROCEDURE — 82043 UR ALBUMIN QUANTITATIVE: CPT | Performed by: INTERNAL MEDICINE

## 2018-10-10 PROCEDURE — 83036 HEMOGLOBIN GLYCOSYLATED A1C: CPT | Performed by: INTERNAL MEDICINE

## 2018-10-10 PROCEDURE — 80053 COMPREHEN METABOLIC PANEL: CPT | Performed by: INTERNAL MEDICINE

## 2018-10-10 PROCEDURE — 84443 ASSAY THYROID STIM HORMONE: CPT | Performed by: INTERNAL MEDICINE

## 2018-10-10 PROCEDURE — 99214 OFFICE O/P EST MOD 30 MIN: CPT | Mod: 25 | Performed by: INTERNAL MEDICINE

## 2018-10-10 PROCEDURE — 90471 IMMUNIZATION ADMIN: CPT | Performed by: INTERNAL MEDICINE

## 2018-10-10 PROCEDURE — 90714 TD VACC NO PRESV 7 YRS+ IM: CPT | Performed by: INTERNAL MEDICINE

## 2018-10-10 PROCEDURE — 90662 IIV NO PRSV INCREASED AG IM: CPT | Performed by: INTERNAL MEDICINE

## 2018-10-10 PROCEDURE — 85027 COMPLETE CBC AUTOMATED: CPT | Performed by: INTERNAL MEDICINE

## 2018-10-10 PROCEDURE — 36415 COLL VENOUS BLD VENIPUNCTURE: CPT | Performed by: INTERNAL MEDICINE

## 2018-10-10 PROCEDURE — 80061 LIPID PANEL: CPT | Performed by: INTERNAL MEDICINE

## 2018-10-10 RX ORDER — LOSARTAN POTASSIUM 50 MG/1
50 TABLET ORAL DAILY
Qty: 90 TABLET | Refills: 3 | Status: CANCELLED | OUTPATIENT
Start: 2018-10-10

## 2018-10-10 RX ORDER — LOSARTAN POTASSIUM 100 MG/1
100 TABLET ORAL DAILY
Qty: 90 TABLET | Refills: 3 | Status: SHIPPED | OUTPATIENT
Start: 2018-10-10 | End: 2019-01-10

## 2018-10-10 RX ORDER — METFORMIN HCL 500 MG
TABLET, EXTENDED RELEASE 24 HR ORAL
Qty: 360 TABLET | Refills: 3 | Status: SHIPPED | OUTPATIENT
Start: 2018-10-10 | End: 2019-01-10

## 2018-10-10 RX ORDER — GLIPIZIDE 10 MG/1
10 TABLET, FILM COATED, EXTENDED RELEASE ORAL DAILY
Qty: 90 TABLET | Refills: 3 | Status: SHIPPED | OUTPATIENT
Start: 2018-10-10 | End: 2019-01-10

## 2018-10-10 RX ORDER — METOPROLOL SUCCINATE 25 MG/1
TABLET, EXTENDED RELEASE ORAL
Qty: 270 TABLET | Refills: 3 | Status: SHIPPED | OUTPATIENT
Start: 2018-10-10 | End: 2019-01-10

## 2018-10-10 RX ORDER — ATORVASTATIN CALCIUM 40 MG/1
40 TABLET, FILM COATED ORAL DAILY
Qty: 90 TABLET | Refills: 3 | Status: SHIPPED | OUTPATIENT
Start: 2018-10-10 | End: 2019-01-10

## 2018-10-10 NOTE — NURSING NOTE
"BP (!) 130/92  Pulse 74  Temp 97.9  F (36.6  C) (Oral)  Resp 24  Ht 5' 10\" (1.778 m)  Wt 168 lb 9.6 oz (76.5 kg)  SpO2 98%  BMI 24.19 kg/m2    "

## 2018-10-10 NOTE — MR AVS SNAPSHOT
After Visit Summary   10/10/2018    Adal Bates    MRN: [id number]           Patient Information     Date Of Birth          1945        Visit Information        Provider Department      10/10/2018 9:00 AM Alvaro Lindo MD [facility]        Today's Diagnoses     Need for prophylactic vaccination and inoculation against influenza    -  1    Ischemic cardiomyopathy        Type 2 diabetes mellitus with complication, without long-term current use of insulin (H)        S/P CABG (coronary artery bypass graft)        Benign essential hypertension        Special screening for malignant neoplasms, colon        Type 2 diabetes, HbA1C goal < 8% (H)           Follow-ups after your visit        Additional Services     GASTROENTEROLOGY ADULT REF PROCEDURE ONLY Fawad Cedeno [phone]       Last Lab Result: Creatinine (mg/dL)       Date                     Value                 12/14/2017               1.34 (H)         ----------  Body mass index is 24.19 kg/(m^2).     Needed:  No  Language:  English    Patient will be contacted to schedule procedure.     Please be aware that coverage of these services is subject to the terms and limitations of your health insurance plan.  Call member services at your health plan with any benefit or coverage questions.  Any procedures must be performed at a [facility] facility OR coordinated by your clinic's referral office.    Please bring the following with you to your appointment:    (1) Any X-Rays, CTs or MRIs which have been performed.  Contact the facility where they were done to arrange for  prior to your scheduled appointment.    (2) List of current medications   (3) This referral request   (4) Any documents/labs given to you for this referral                  Follow-up notes from your care team     Return in about 6 months (around 4/10/2019) for Routine Visit.      Future tests that were ordered for you today     Open  "Future Orders        Priority Expected Expires Ordered    Echocardiogram Complete Routine  10/10/2019 10/10/2018            Who to contact     If you have questions or need follow up information about today's clinic visit or your schedule please contact [facility] directly at [phone].  Normal or non-critical lab and imaging results will be communicated to you by MyChart, letter or phone within 4 business days after the clinic has received the results. If you do not hear from us within 7 days, please contact the clinic through [employer] or phone. If you have a critical or abnormal lab result, we will notify you by phone as soon as possible.  Submit refill requests through [employer] or call your pharmacy and they will forward the refill request to us. Please allow 3 business days for your refill to be completed.          Additional Information About Your Visit        MyChart Information     [employer] lets you send messages to your doctor, view your test results, renew your prescriptions, schedule appointments and more. To sign up, go to [email or web address] . Click on \"Log in\" on the left side of the screen, which will take you to the Welcome page. Then click on \"Sign up Now\" on the right side of the page.     You will be asked to enter the access code listed below, as well as some personal information. Please follow the directions to create your username and password.     Your access code is: [email or web address]  Expires: 2019  8:30 AM     Your access code will  in 90 days. If you need help or a new code, please call your [facility] or [phone].        Care EveryWhere ID     This is your Care EveryWhere ID. This could be used by other organizations to access your [place] medical records  [id number]        Your Vitals Were     Pulse Temperature Respirations Height Pulse Oximetry BMI (Body Mass Index)    74 97.9  F (36.6  C) (Oral) 24 5' 10\" (1.778 m) 98% 24.19 kg/m2       Blood " Pressure from Last 3 Encounters:   10/10/18 (!) 130/92   02/27/18 120/88   12/13/17 128/78    Weight from Last 3 Encounters:   10/10/18 168 lb 9.6 oz (76.5 kg)   02/27/18 166 lb 9.6 oz (75.6 kg)   12/13/17 165 lb (74.8 kg)              We Performed the Following     ADMIN INFLUENZA (For MEDICARE Patients ONLY) []     Albumin Random Urine Quantitative with Creat Ratio     CBC with platelets     Comprehensive metabolic panel     FLU VACCINE, INCREASED ANTIGEN, PRESV FREE, AGE 65+ [48461]     GASTROENTEROLOGY ADULT REF PROCEDURE ONLY Fawad Cedeno [phone]     Hemoglobin A1c     Lipid panel reflex to direct LDL Fasting     TSH with free T4 reflex          Today's Medication Changes          These changes are accurate as of 10/10/18  9:46 AM.  If you have any questions, ask your nurse or doctor.               These medicines have changed or have updated prescriptions.        Dose/Directions    atorvastatin 40 MG tablet   Commonly known as:  LIPITOR   This may have changed:  See the new instructions.   Used for:  Ischemic cardiomyopathy   Changed by:  Alvaro Lindo MD        Dose:  40 mg   Take 1 tablet (40 mg) by mouth daily   Quantity:  90 tablet   Refills:  3       glipiZIDE 10 MG 24 hr tablet   Commonly known as:  GLUCOTROL XL   This may have changed:  See the new instructions.   Used for:  Type 2 diabetes mellitus with complication, without long-term current use of insulin (H)   Changed by:  Alvaro Lindo MD        Dose:  10 mg   Take 1 tablet (10 mg) by mouth daily   Quantity:  90 tablet   Refills:  3       * losartan 50 MG tablet   Commonly known as:  COZAAR   This may have changed:  Another medication with the same name was added. Make sure you understand how and when to take each.   Used for:  S/P CABG (coronary artery bypass graft)   Changed by:  Alvaro Lindo MD        TAKE 1 TABLET DAILY   Quantity:  90 tablet   Refills:  0       * losartan 100 MG tablet   Commonly known as:   COZAAR   This may have changed:  You were already taking a medication with the same name, and this prescription was added. Make sure you understand how and when to take each.   Used for:  Ischemic cardiomyopathy, Type 2 diabetes mellitus with complication, without long-term current use of insulin (H), Benign essential hypertension   Changed by:  Alvaro Lindo MD        Dose:  100 mg   Take 1 tablet (100 mg) by mouth daily   Quantity:  90 tablet   Refills:  3       * Notice:  This list has 2 medication(s) that are the same as other medications prescribed for you. Read the directions carefully, and ask your doctor or other care provider to review them with you.         Where to get your medicines      These medications were sent to [facility] [address] [address]     Phone:  [phone]     atorvastatin 40 MG tablet    glipiZIDE 10 MG 24 hr tablet    losartan 100 MG tablet    metFORMIN 500 MG 24 hr tablet    metoprolol succinate 25 MG 24 hr tablet                Primary Care Provider Office Phone # Fax #    Alvaro Lindo [phone]       [address] [facility] [address]        Equal Access to Services     [facility] AH: Hadii aad ku hadasho Soomaali, waaxda luqadaha, qaybta kaalmada adeegyada, german hercules haymaricarmenn katelyn dia . So [facility] [phone].    ATENCIÓN: Si habla español, tiene a nolasco disposición servicios gratuitos de asistencia lingüística. Llame al [phone].    We comply with applicable federal civil rights laws and [place] laws. We do not discriminate on the basis of race, color, national origin, age, disability, sex, sexual orientation, or gender identity.            Thank you!     Thank you for choosing [facility]  for your care. Our goal is always to provide you with excellent care. Hearing back from our patients is one way we can continue to improve our  services. Please take a few minutes to complete the written survey that you may receive in the mail after your visit with us. Thank you!             Your Updated Medication List - Protect others around you: Learn how to safely use, store and throw away your medicines at www.disposemymeds.org.          This list is accurate as of 10/10/18  9:46 AM.  Always use your most recent med list.                   Brand Name Dispense Instructions for use Diagnosis    ACCU-CHEK COMPLETE Kit     1 kit    1 kit daily    Type 2 diabetes, HbA1C goal < 8% (H)       aspirin 81 MG EC tablet     90 tablet    Take 1 tablet (81 mg) by mouth daily    S/P CABG (coronary artery bypass graft)       atorvastatin 40 MG tablet    LIPITOR    90 tablet    Take 1 tablet (40 mg) by mouth daily    Ischemic cardiomyopathy       COENZYME Q-10 PO      Take 200 mg by mouth daily        glipiZIDE 10 MG 24 hr tablet    GLUCOTROL XL    90 tablet    Take 1 tablet (10 mg) by mouth daily    Type 2 diabetes mellitus with complication, without long-term current use of insulin (H)       * losartan 50 MG tablet    COZAAR    90 tablet    TAKE 1 TABLET DAILY    S/P CABG (coronary artery bypass graft)       * losartan 100 MG tablet    COZAAR    90 tablet    Take 1 tablet (100 mg) by mouth daily    Ischemic cardiomyopathy, Type 2 diabetes mellitus with complication, without long-term current use of insulin (H), Benign essential hypertension       metFORMIN 500 MG 24 hr tablet    GLUCOPHAGE-XR    360 tablet    TAKE 2 TABLETS TWICE A DAY WITH MEALS    Type 2 diabetes mellitus with complication, without long-term current use of insulin (H)       metoprolol succinate 25 MG 24 hr tablet    TOPROL-XL    270 tablet    TAKE ONE AND ONE-HALF TABLETS TWICE A DAY    S/P CABG (coronary artery bypass graft)       multivitamin, therapeutic with minerals Tabs tablet      Take 1 tablet by mouth daily        omega 3 1000 MG Caps     90 capsule    Take 1 g by mouth daily         vitamin D 2000 units tablet     100 tablet    Take 2,000 Units by mouth daily        * Notice:  This list has 2 medication(s) that are the same as other medications prescribed for you. Read the directions carefully, and ask your doctor or other care provider to review them with you.
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
## 2018-10-11 LAB
ALBUMIN SERPL-MCNC: 3.7 G/DL (ref 3.4–5)
ALP SERPL-CCNC: 72 U/L (ref 40–150)
ALT SERPL W P-5'-P-CCNC: 29 U/L (ref 0–70)
ANION GAP SERPL CALCULATED.3IONS-SCNC: 8 MMOL/L (ref 3–14)
AST SERPL W P-5'-P-CCNC: 23 U/L (ref 0–45)
BILIRUB SERPL-MCNC: 1.3 MG/DL (ref 0.2–1.3)
BUN SERPL-MCNC: 10 MG/DL (ref 7–30)
CALCIUM SERPL-MCNC: 9.5 MG/DL (ref 8.5–10.1)
CHLORIDE SERPL-SCNC: 105 MMOL/L (ref 94–109)
CHOLEST SERPL-MCNC: 126 MG/DL
CO2 SERPL-SCNC: 27 MMOL/L (ref 20–32)
CREAT SERPL-MCNC: 0.92 MG/DL (ref 0.66–1.25)
CREAT UR-MCNC: 90 MG/DL
GFR SERPL CREATININE-BSD FRML MDRD: 81 ML/MIN/1.7M2
GLUCOSE SERPL-MCNC: 90 MG/DL (ref 70–99)
HDLC SERPL-MCNC: 42 MG/DL
LDLC SERPL CALC-MCNC: 57 MG/DL
MICROALBUMIN UR-MCNC: 132 MG/L
MICROALBUMIN/CREAT UR: 146.34 MG/G CR (ref 0–17)
NONHDLC SERPL-MCNC: 84 MG/DL
POTASSIUM SERPL-SCNC: 4.3 MMOL/L (ref 3.4–5.3)
PROT SERPL-MCNC: 7.4 G/DL (ref 6.8–8.8)
SODIUM SERPL-SCNC: 140 MMOL/L (ref 133–144)
TRIGL SERPL-MCNC: 135 MG/DL
TSH SERPL DL<=0.005 MIU/L-ACNC: 1.98 MU/L (ref 0.4–4)

## 2018-10-31 ENCOUNTER — HOSPITAL ENCOUNTER (OUTPATIENT)
Dept: CARDIOLOGY | Facility: CLINIC | Age: 73
Discharge: HOME | End: 2018-10-31
Attending: INTERNAL MEDICINE | Admitting: INTERNAL MEDICINE
Payer: MEDICARE

## 2018-10-31 DIAGNOSIS — I25.5 ISCHEMIC CARDIOMYOPATHY: ICD-10-CM

## 2018-10-31 PROCEDURE — 93306 TTE W/DOPPLER COMPLETE: CPT | Mod: 26 | Performed by: INTERNAL MEDICINE

## 2018-10-31 PROCEDURE — 93306 TTE W/DOPPLER COMPLETE: CPT

## 2019-01-08 ENCOUNTER — TRANSFERRED RECORDS (OUTPATIENT)
Dept: HEALTH INFORMATION MANAGEMENT | Facility: CLINIC | Age: 74
End: 2019-01-08

## 2019-01-08 ENCOUNTER — TELEPHONE (OUTPATIENT)
Dept: INTERNAL MEDICINE | Facility: CLINIC | Age: 74
End: 2019-01-08

## 2019-01-08 DIAGNOSIS — I10 BENIGN ESSENTIAL HYPERTENSION: ICD-10-CM

## 2019-01-08 DIAGNOSIS — E11.8 TYPE 2 DIABETES MELLITUS WITH COMPLICATION, WITHOUT LONG-TERM CURRENT USE OF INSULIN (H): ICD-10-CM

## 2019-01-08 DIAGNOSIS — Z95.1 S/P CABG (CORONARY ARTERY BYPASS GRAFT): ICD-10-CM

## 2019-01-08 DIAGNOSIS — I25.5 ISCHEMIC CARDIOMYOPATHY: ICD-10-CM

## 2019-01-08 NOTE — TELEPHONE ENCOUNTER
Reason for Call:  Other prescription    Detailed comments: Pt is calling stating his pharmacy has changed and would like a paper rx for all of his meds.  Pt stated he has to use a mail order pharmacy but was not sure which one and would rather just have the hard copy of the rx.    Phone Number Patient can be reached at: Home number on file [phone] (home)    Best Time: any    Can we leave a detailed message on this number? YES    Call taken on 1/8/2019 at 11:55 AM by Brenda Vila

## 2019-01-10 NOTE — TELEPHONE ENCOUNTER
Pt is changing pharmacies and would like paper Rxs for all his meds.     Need to print and sign his prescriptions.     Last OV 10/10/18.     BP Readings from Last 3 Encounters:   10/10/18 (!) 130/92   02/27/18 120/88   12/13/17 128/78     Lab Results   Component Value Date    A1C 7.9 10/10/2018    A1C 7.9 12/14/2017    A1C 8.5 12/14/2016    A1C 8.0 05/25/2016    A1C 8.3 02/16/2016     Creatinine   Date Value Ref Range Status   10/10/2018 0.92 0.66 - 1.25 mg/dL Final     LDL Cholesterol Calculated   Date Value Ref Range Status   10/10/2018 57 <100 mg/dL Final     Comment:     Desirable:       <100 mg/dl

## 2019-01-11 ENCOUNTER — TELEPHONE (OUTPATIENT)
Dept: INTERNAL MEDICINE | Facility: CLINIC | Age: 74
End: 2019-01-11

## 2019-01-11 RX ORDER — LOSARTAN POTASSIUM 100 MG/1
100 TABLET ORAL DAILY
Qty: 90 TABLET | Refills: 3 | Status: SHIPPED | OUTPATIENT
Start: 2019-01-11 | End: 2019-11-18

## 2019-01-11 RX ORDER — GLIPIZIDE 10 MG/1
10 TABLET, FILM COATED, EXTENDED RELEASE ORAL DAILY
Qty: 90 TABLET | Refills: 3 | Status: SHIPPED | OUTPATIENT
Start: 2019-01-11 | End: 2019-07-01

## 2019-01-11 RX ORDER — METFORMIN HCL 500 MG
TABLET, EXTENDED RELEASE 24 HR ORAL
Qty: 360 TABLET | Refills: 3 | Status: SHIPPED | OUTPATIENT
Start: 2019-01-11 | End: 2019-11-18

## 2019-01-11 RX ORDER — METOPROLOL SUCCINATE 25 MG/1
TABLET, EXTENDED RELEASE ORAL
Qty: 270 TABLET | Refills: 3 | Status: SHIPPED | OUTPATIENT
Start: 2019-01-11 | End: 2019-11-18

## 2019-01-11 RX ORDER — ATORVASTATIN CALCIUM 40 MG/1
40 TABLET, FILM COATED ORAL DAILY
Qty: 90 TABLET | Refills: 3 | Status: SHIPPED | OUTPATIENT
Start: 2019-01-11 | End: 2019-11-18

## 2019-07-01 ENCOUNTER — TRANSFERRED RECORDS (OUTPATIENT)
Dept: HEALTH INFORMATION MANAGEMENT | Facility: CLINIC | Age: 74
End: 2019-07-01

## 2019-07-01 DIAGNOSIS — E11.8 TYPE 2 DIABETES MELLITUS WITH COMPLICATION, WITHOUT LONG-TERM CURRENT USE OF INSULIN (H): ICD-10-CM

## 2019-07-01 LAB — RETINOPATHY: NORMAL

## 2019-07-01 NOTE — TELEPHONE ENCOUNTER
"Requested Prescriptions   Pending Prescriptions Disp Refills     glipiZIDE (GLUCOTROL XL) 10 MG 24 hr tablet [Pharmacy Med Name: GLIPIZIDE ER 10MG   TAB] 90 tablet 3     Sig: TAKE 1 TABLET BY MOUTH ONCE DAILY   Last Written Prescription Date:  1/11/2019  Last Fill Quantity: 90,  # refills: 3   Last office visit: 10/10/2018 with prescribing provider:     Future Office Visit:      Sulfonylurea Agents Failed - 7/1/2019 10:43 AM        Failed - Blood pressure less than 140/90 in past 6 months     BP Readings from Last 3 Encounters:   10/10/18 (!) 130/92   02/27/18 120/88   12/13/17 128/78                 Failed - Patient has documented A1c within the specified period of time.     If HgbA1C is 8 or greater, it needs to be on file within the past 3 months.  If less than 8, must be on file within the past 6 months.     Recent Labs   Lab Test 10/10/18  1001   A1C 7.9*             Failed - Recent (6 mo) or future (30 days) visit within the authorizing provider's specialty     Patient had office visit in the last 6 months or has a visit in the next 30 days with authorizing provider or within the authorizing provider's specialty.  See \"Patient Info\" tab in inbasket, or \"Choose Columns\" in Meds & Orders section of the refill encounter.            Passed - Patient has documented LDL within the past 12 mos.     Recent Labs   Lab Test 10/10/18  1001   LDL 57             Passed - Patient has had a Microalbumin in the past 15 mos.     Recent Labs   Lab Test 10/10/18  1001  05/17/14   MICROALB  --   --  3.1   MICROL 132   < >  --    UMALCR 146.34*   < > 26    < > = values in this interval not displayed.             Passed - Medication is active on med list        Passed - Patient is age 18 or older        Passed - Patient has a recent creatinine (normal) within the past 12 mos.     Recent Labs   Lab Test 10/10/18  1001   CR 0.92             "

## 2019-07-01 NOTE — LETTER
[place] [address]  [facility] [address]  Phone: [phone]        July 2, 2019      Adal Paulo                                                                                                                                [address] [facility] [address]            Dear Aleena Bates,    We are concerned about your health care.  We recently provided you with a medication refill.  Many medications require routine follow-up with your Doctor.      At this time we ask that: You schedule a routine office visit with your physician to follow your Diabetes.    Your prescription: Has been refilled for 1 month so you may have time for the above noted follow-up.      Thank you,    [facility]

## 2019-07-02 RX ORDER — GLIPIZIDE 10 MG/1
TABLET, FILM COATED, EXTENDED RELEASE ORAL
Qty: 90 TABLET | Refills: 0 | Status: SHIPPED | OUTPATIENT
Start: 2019-07-02 | End: 2019-11-18

## 2019-07-02 NOTE — TELEPHONE ENCOUNTER
Medication is being filled for 1 time refill only due to:  Patient needs to be seen because diabetic follow up due.     Please mail letter or call patient to remind him that he is due for a visit.  Virginia Breen RN

## 2019-11-18 ENCOUNTER — OFFICE VISIT (OUTPATIENT)
Dept: INTERNAL MEDICINE | Facility: CLINIC | Age: 74
End: 2019-11-18
Payer: MEDICARE

## 2019-11-18 ENCOUNTER — DOCUMENTATION ONLY (OUTPATIENT)
Dept: INTERNAL MEDICINE | Facility: CLINIC | Age: 74
End: 2019-11-18

## 2019-11-18 VITALS
RESPIRATION RATE: 20 BRPM | HEIGHT: 68 IN | HEART RATE: 78 BPM | OXYGEN SATURATION: 100 % | DIASTOLIC BLOOD PRESSURE: 80 MMHG | BODY MASS INDEX: 24.51 KG/M2 | TEMPERATURE: 97.9 F | SYSTOLIC BLOOD PRESSURE: 124 MMHG | WEIGHT: 161.7 LBS

## 2019-11-18 DIAGNOSIS — E11.8 TYPE 2 DIABETES MELLITUS WITH COMPLICATION, WITHOUT LONG-TERM CURRENT USE OF INSULIN (H): ICD-10-CM

## 2019-11-18 DIAGNOSIS — N40.1 BENIGN PROSTATIC HYPERPLASIA WITH URINARY FREQUENCY: ICD-10-CM

## 2019-11-18 DIAGNOSIS — Z95.1 S/P CABG (CORONARY ARTERY BYPASS GRAFT): ICD-10-CM

## 2019-11-18 DIAGNOSIS — Z12.11 SPECIAL SCREENING FOR MALIGNANT NEOPLASMS, COLON: ICD-10-CM

## 2019-11-18 DIAGNOSIS — Z12.5 SCREENING FOR PROSTATE CANCER: ICD-10-CM

## 2019-11-18 DIAGNOSIS — I25.5 ISCHEMIC CARDIOMYOPATHY: ICD-10-CM

## 2019-11-18 DIAGNOSIS — R35.0 BENIGN PROSTATIC HYPERPLASIA WITH URINARY FREQUENCY: ICD-10-CM

## 2019-11-18 DIAGNOSIS — Z00.00 ENCOUNTER FOR PREVENTATIVE ADULT HEALTH CARE EXAMINATION: Primary | ICD-10-CM

## 2019-11-18 DIAGNOSIS — E55.9 VITAMIN D DEFICIENCY: ICD-10-CM

## 2019-11-18 DIAGNOSIS — I10 BENIGN ESSENTIAL HYPERTENSION: ICD-10-CM

## 2019-11-18 DIAGNOSIS — R20.2 PARESTHESIA: ICD-10-CM

## 2019-11-18 DIAGNOSIS — Z23 NEED FOR PROPHYLACTIC VACCINATION AND INOCULATION AGAINST INFLUENZA: ICD-10-CM

## 2019-11-18 DIAGNOSIS — Z23 NEED FOR 23-POLYVALENT PNEUMOCOCCAL POLYSACCHARIDE VACCINE: ICD-10-CM

## 2019-11-18 DIAGNOSIS — M62.81 MUSCLE WEAKNESS (GENERALIZED): ICD-10-CM

## 2019-11-18 LAB
ALBUMIN UR-MCNC: 30 MG/DL
APPEARANCE UR: CLEAR
BILIRUB UR QL STRIP: NEGATIVE
COLOR UR AUTO: YELLOW
ERYTHROCYTE [DISTWIDTH] IN BLOOD BY AUTOMATED COUNT: 14.9 % (ref 10–15)
GLUCOSE UR STRIP-MCNC: NEGATIVE MG/DL
HBA1C MFR BLD: 7.5 % (ref 0–5.6)
HCT VFR BLD AUTO: 47.8 % (ref 40–53)
HGB BLD-MCNC: 15.1 G/DL (ref 13.3–17.7)
HGB UR QL STRIP: ABNORMAL
KETONES UR STRIP-MCNC: NEGATIVE MG/DL
LEUKOCYTE ESTERASE UR QL STRIP: ABNORMAL
MCH RBC QN AUTO: 27.8 PG (ref 26.5–33)
MCHC RBC AUTO-ENTMCNC: 31.6 G/DL (ref 31.5–36.5)
MCV RBC AUTO: 88 FL (ref 78–100)
NITRATE UR QL: NEGATIVE
PH UR STRIP: 5.5 PH (ref 5–7)
PLATELET # BLD AUTO: 196 10E9/L (ref 150–450)
RBC # BLD AUTO: 5.44 10E12/L (ref 4.4–5.9)
RBC #/AREA URNS AUTO: NORMAL /HPF
SOURCE: ABNORMAL
SP GR UR STRIP: 1.02 (ref 1–1.03)
UROBILINOGEN UR STRIP-ACNC: 0.2 EU/DL (ref 0.2–1)
VIT B12 SERPL-MCNC: 177 PG/ML (ref 193–986)
WBC # BLD AUTO: 6.8 10E9/L (ref 4–11)
WBC #/AREA URNS AUTO: NORMAL /HPF

## 2019-11-18 PROCEDURE — G0103 PSA SCREENING: HCPCS | Performed by: INTERNAL MEDICINE

## 2019-11-18 PROCEDURE — G0008 ADMIN INFLUENZA VIRUS VAC: HCPCS | Performed by: INTERNAL MEDICINE

## 2019-11-18 PROCEDURE — 82043 UR ALBUMIN QUANTITATIVE: CPT | Performed by: INTERNAL MEDICINE

## 2019-11-18 PROCEDURE — 90662 IIV NO PRSV INCREASED AG IM: CPT | Performed by: INTERNAL MEDICINE

## 2019-11-18 PROCEDURE — 84443 ASSAY THYROID STIM HORMONE: CPT | Performed by: INTERNAL MEDICINE

## 2019-11-18 PROCEDURE — 99214 OFFICE O/P EST MOD 30 MIN: CPT | Mod: 25 | Performed by: INTERNAL MEDICINE

## 2019-11-18 PROCEDURE — 85027 COMPLETE CBC AUTOMATED: CPT | Performed by: INTERNAL MEDICINE

## 2019-11-18 PROCEDURE — 80053 COMPREHEN METABOLIC PANEL: CPT | Performed by: INTERNAL MEDICINE

## 2019-11-18 PROCEDURE — 81001 URINALYSIS AUTO W/SCOPE: CPT | Performed by: INTERNAL MEDICINE

## 2019-11-18 PROCEDURE — 83036 HEMOGLOBIN GLYCOSYLATED A1C: CPT | Performed by: INTERNAL MEDICINE

## 2019-11-18 PROCEDURE — 82607 VITAMIN B-12: CPT | Performed by: INTERNAL MEDICINE

## 2019-11-18 PROCEDURE — 80061 LIPID PANEL: CPT | Performed by: INTERNAL MEDICINE

## 2019-11-18 PROCEDURE — 82550 ASSAY OF CK (CPK): CPT | Performed by: INTERNAL MEDICINE

## 2019-11-18 PROCEDURE — 82306 VITAMIN D 25 HYDROXY: CPT | Performed by: INTERNAL MEDICINE

## 2019-11-18 PROCEDURE — G0439 PPPS, SUBSEQ VISIT: HCPCS | Performed by: INTERNAL MEDICINE

## 2019-11-18 PROCEDURE — 36415 COLL VENOUS BLD VENIPUNCTURE: CPT | Performed by: INTERNAL MEDICINE

## 2019-11-18 RX ORDER — LOSARTAN POTASSIUM 100 MG/1
100 TABLET ORAL DAILY
Qty: 90 TABLET | Refills: 3 | Status: SHIPPED | OUTPATIENT
Start: 2019-11-18 | End: 2020-10-12

## 2019-11-18 RX ORDER — ATORVASTATIN CALCIUM 40 MG/1
40 TABLET, FILM COATED ORAL DAILY
Qty: 90 TABLET | Refills: 3 | Status: SHIPPED | OUTPATIENT
Start: 2019-11-18 | End: 2020-10-12

## 2019-11-18 RX ORDER — METFORMIN HCL 500 MG
TABLET, EXTENDED RELEASE 24 HR ORAL
Qty: 360 TABLET | Refills: 3 | Status: SHIPPED | OUTPATIENT
Start: 2019-11-18 | End: 2020-10-12

## 2019-11-18 RX ORDER — FINASTERIDE 5 MG/1
5 TABLET, FILM COATED ORAL DAILY
Qty: 90 TABLET | Refills: 3 | Status: SHIPPED | OUTPATIENT
Start: 2019-11-18 | End: 2020-10-12

## 2019-11-18 RX ORDER — METOPROLOL SUCCINATE 25 MG/1
TABLET, EXTENDED RELEASE ORAL
Qty: 270 TABLET | Refills: 3 | Status: SHIPPED | OUTPATIENT
Start: 2019-11-18 | End: 2020-10-12

## 2019-11-18 RX ORDER — GLIPIZIDE 10 MG/1
10 TABLET, FILM COATED, EXTENDED RELEASE ORAL DAILY
Qty: 90 TABLET | Refills: 1 | Status: SHIPPED | OUTPATIENT
Start: 2019-11-18 | End: 2020-08-13

## 2019-11-18 ASSESSMENT — ENCOUNTER SYMPTOMS: DYSURIA: 0

## 2019-11-18 ASSESSMENT — MIFFLIN-ST. JEOR: SCORE: 1442.97

## 2019-11-18 ASSESSMENT — ACTIVITIES OF DAILY LIVING (ADL): CURRENT_FUNCTION: NO ASSISTANCE NEEDED

## 2019-11-18 NOTE — PROGRESS NOTES
TERESA completed and faxed to [facility] for records from pt's last diabetic eye exam.     Original form sent to medical records to be scanned.

## 2019-11-18 NOTE — NURSING NOTE
"/80 (BP Location: Left arm, Patient Position: Sitting, Cuff Size: Adult Large)   Pulse 78   Temp 97.9  F (36.6  C) (Oral)   Resp 20   Ht 1.727 m (5' 8\")   Wt 73.3 kg (161 lb 11.2 oz)   SpO2 100%   BMI 24.59 kg/m    Makenzie Clinton CMA    "

## 2019-11-18 NOTE — LETTER
[facility]  [address], [address]  [place] [address]  [phone]        November 20, 2019    Adal Bates  [address] [facility] [address]            Dear Mr. Adal Bates:      The results of your recent labs show slightly decreased kidney function. Recommend to recheck in 3 months.   Improved diabetic control. Still needs to keep diet, exercise.   Follow up A1C in 3 months.   If you have any further questions or problems, please contact our office.    Sincerely,    Alvaro Lindo M.D.    Results for orders placed or performed in visit on 11/18/19   CBC with platelets     Status: None   Result Value Ref Range    WBC 6.8 4.0 - 11.0 10e9/L    RBC Count 5.44 4.4 - 5.9 10e12/L    Hemoglobin 15.1 13.3 - 17.7 g/dL    Hematocrit 47.8 40.0 - 53.0 %    MCV 88 78 - 100 fl    MCH 27.8 26.5 - 33.0 pg    MCHC 31.6 31.5 - 36.5 g/dL    RDW 14.9 10.0 - 15.0 %    Platelet Count 196 150 - 450 10e9/L   Comprehensive metabolic panel     Status: Abnormal   Result Value Ref Range    Sodium 138 133 - 144 mmol/L    Potassium 4.8 3.4 - 5.3 mmol/L    Chloride 108 94 - 109 mmol/L    Carbon Dioxide 25 20 - 32 mmol/L    Anion Gap 5 3 - 14 mmol/L    Glucose 142 (H) 70 - 99 mg/dL    Urea Nitrogen 19 7 - 30 mg/dL    Creatinine 1.43 (H) 0.66 - 1.25 mg/dL    GFR Estimate 48 (L) >60 mL/min/[1.73_m2]    GFR Estimate If Black 55 (L) >60 mL/min/[1.73_m2]    Calcium 9.5 8.5 - 10.1 mg/dL    Bilirubin Total 1.4 (H) 0.2 - 1.3 mg/dL    Albumin 3.7 3.4 - 5.0 g/dL    Protein Total 7.4 6.8 - 8.8 g/dL    Alkaline Phosphatase 86 40 - 150 U/L    ALT 26 0 - 70 U/L    AST 12 0 - 45 U/L   Lipid panel reflex to direct LDL Fasting     Status: Abnormal   Result Value Ref Range    Cholesterol 102 <200 mg/dL    Triglycerides 79 <150 mg/dL    HDL Cholesterol 31 (L) >39 mg/dL    LDL Cholesterol Calculated 55 <100 mg/dL    Non HDL Cholesterol 71 <130 mg/dL   TSH with free T4 reflex     Status: None   Result Value Ref Range    TSH 2.95 0.40  - 4.00 mU/L   Prostate spec antigen screen     Status: None   Result Value Ref Range    PSA 3.31 0 - 4 ug/L   Hemoglobin A1c     Status: Abnormal   Result Value Ref Range    Hemoglobin A1C 7.5 (H) 0 - 5.6 %   *UA reflex to Microscopic     Status: Abnormal   Result Value Ref Range    Color Urine Yellow     Appearance Urine Clear     Glucose Urine Negative NEG^Negative mg/dL    Bilirubin Urine Negative NEG^Negative    Ketones Urine Negative NEG^Negative mg/dL    Specific Gravity Urine 1.020 1.003 - 1.035    Blood Urine Trace (A) NEG^Negative    pH Urine 5.5 5.0 - 7.0 pH    Protein Albumin Urine 30 (A) NEG^Negative mg/dL    Urobilinogen Urine 0.2 0.2 - 1.0 EU/dL    Nitrite Urine Negative NEG^Negative    Leukocyte Esterase Urine Trace (A) NEG^Negative    Source Midstream Urine    Albumin Random Urine Quantitative with Creat Ratio     Status: Abnormal   Result Value Ref Range    Creatinine Urine 125 mg/dL    Albumin Urine mg/L 195 mg/L    Albumin Urine mg/g Cr 156.00 (H) 0 - 17 mg/g Cr   Vitamin B12     Status: Abnormal   Result Value Ref Range    Vitamin B12 177 (L) 193 - 986 pg/mL   Vitamin D Deficiency     Status: None   Result Value Ref Range    Vitamin D Deficiency screening 28 20 - 75 ug/L   CK total     Status: None   Result Value Ref Range    CK Total 64 30 - 300 U/L   Urine Microscopic     Status: None   Result Value Ref Range    WBC Urine 0 - 5 OTO5^0 - 5 /HPF    RBC Urine O - 2 OTO2^O - 2 /HPF

## 2019-11-18 NOTE — PROGRESS NOTES
"SUBJECTIVE:   Adal Bates is a 74 year old male who presents for Preventive Visit.      Are you in the first 12 months of your Medicare coverage?  No    Healthy Habits:     In general, how would you rate your overall health?  Good    Frequency of exercise:  4-5 days/week    Duration of exercise:  15-30 minutes    Do you usually eat at least 4 servings of fruit and vegetables a day, include whole grains    & fiber and avoid regularly eating high fat or \"junk\" foods?  Yes    Taking medications regularly:  Yes    Medication side effects:  Other    Ability to successfully perform activities of daily living:  No assistance needed    Home Safety:  No safety concerns identified    Hearing Impairment:  No hearing concerns    In the past 6 months, have you been bothered by leaking of urine?  No    In general, how would you rate your overall mental or emotional health?  Good      PHQ-2 Total Score: 0    Additional concerns today:  No    Do you feel safe in your environment? Yes    Have you ever done Advance Care Planning? (For example, a Health Directive, POLST, or a discussion with a medical provider or your loved ones about your wishes): No, advance care planning information given to patient to review.  Patient plans to discuss their wishes with loved ones or provider.        Fall risk  Fallen 2 or more times in the past year?: No  Any fall with injury in the past year?: No    Cognitive Screening   1) Repeat 3 items (Leader, Season, Table)    2) Clock draw: NORMAL  3) 3 item recall: Recalls 1 object   Results: NORMAL clock, 1-2 items recalled: COGNITIVE IMPAIRMENT LESS LIKELY    Mini-CogTM Copyright DONTA Tracy. Licensed by the author for use in [facility]; reprinted with permission (jose c@.[facility]). All rights reserved.      Do you have sleep apnea, excessive snoring or daytime drowsiness?: reports that he does snore, and he does have some days where he'll feel really sleepy    Reviewed and updated as needed this " visit by clinical staff  Tobacco  Allergies  Med Hx  Surg Hx  Fam Hx  Soc Hx        Reviewed and updated as needed this visit by Provider        Social History     Tobacco Use     Smoking status: Never Smoker     Smokeless tobacco: Never Used   Substance Use Topics     Alcohol use: Yes     Alcohol/week: 0.0 standard drinks     Comment: social     If you drink alcohol do you typically have >3 drinks per day or >7 drinks per week? No    Alcohol Use 11/18/2019   Prescreen: >3 drinks/day or >7 drinks/week? No   Prescreen: >3 drinks/day or >7 drinks/week? -           PROBLEMS TO ADD ON...    Has H/O DM. On diet , exercise and PO treatment. Blood sugars are controlled.  Has had toes parestesias. No hypoglycemias.  Has h/o ischemic heart disease with ischemic CMP, EF 30-35%, asymptomatic regarding chest pains, SOB,palpitations. Has good compliance with treatment, diet and exercise.  On statin treatment, has concerns for multiple symptoms as possible side effects - dry mouth, urine frequency, dizziness, weakness.   Has h/o HTN. on medical treatment. BP has been controlled. No side effects from medications. No CP, HA, dizziness. good compliance with medications and low salt diet.  Has symptoms of urine frequency , every hour during the day. No nocturia.        Current providers sharing in care for this patient include:   Patient Care Team:  Alvaro Lindo MD as PCP - General (Internal Medicine)  Alvaro Lindo MD as Assigned PCP  Hector Hall MD as MD (Cardiology)    The following health maintenance items are reviewed in Epic and correct as of today:  Health Maintenance   Topic Date Due     ZOSTER IMMUNIZATION (2 of 3) 08/11/2009     AORTIC ANEURYSM SCREENING (SYSTEM ASSIGNED)  10/23/2009     EYE EXAM  03/01/2016     PNEUMOCOCCAL IMMUNIZATION 65+ LOW/MEDIUM RISK (2 of 2 - PPSV23) 02/22/2017     DIABETIC FOOT EXAM  12/14/2017     MEDICARE ANNUAL WELLNESS VISIT  12/13/2018     PHQ-2  01/01/2019     A1C  " 04/10/2019     INFLUENZA VACCINE (1) 09/01/2019     BMP  10/10/2019     LIPID  10/10/2019     MICROALBUMIN  10/10/2019     FALL RISK ASSESSMENT  10/10/2019     TSH W/FREE T4 REFLEX  10/10/2020     ADVANCE CARE PLANNING  11/18/2024     COLONOSCOPY  12/13/2027     DTAP/TDAP/TD IMMUNIZATION (3 - Td) 10/10/2028     IPV IMMUNIZATION  Aged Out     MENINGITIS IMMUNIZATION  Aged Out     Lab work is in process  Labs reviewed in EPIC      Review of Systems   Genitourinary: Negative for discharge, dysuria, impotence and urgency.     CONSTITUTIONAL: NEGATIVE for fever, chills, change in weight  INTEGUMENTARY/SKIN: NEGATIVE for worrisome rashes, moles or lesions  EYES: NEGATIVE for vision changes or irritation  ENT/MOUTH: NEGATIVE for ear, mouth and throat problems  RESP: NEGATIVE for significant cough or SOB  BREAST: NEGATIVE for masses, tenderness or discharge  CV: NEGATIVE for chest pain, palpitations or peripheral edema  GI: NEGATIVE for nausea, abdominal pain, heartburn, or change in bowel habits  : NEGATIVE for frequency, dysuria, or hematuria  MUSCULOSKELETAL: NEGATIVE for significant arthralgias or myalgia  NEURO: NEGATIVE for weakness, dizziness or paresthesias  ENDOCRINE: NEGATIVE for temperature intolerance, skin/hair changes  HEME: NEGATIVE for bleeding problems  PSYCHIATRIC: NEGATIVE for changes in mood or affect    OBJECTIVE:   There were no vitals taken for this visit. Estimated body mass index is 24.19 kg/m  as calculated from the following:    Height as of 10/10/18: 1.778 m (5' 10\").    Weight as of 10/10/18: 76.5 kg (168 lb 9.6 oz).  Physical Exam  GENERAL: healthy, alert and no distress  EYES: Eyes grossly normal to inspection, PERRL and conjunctivae and sclerae normal  HENT: ear canals and TM's normal, nose and mouth without ulcers or lesions  NECK: no adenopathy, no asymmetry, masses, or scars and thyroid normal to palpation  RESP: lungs clear to auscultation - no rales, rhonchi or wheezes  CV: regular " rate and rhythm, normal S1 S2, no S3 or S4, no murmur, click or rub, no peripheral edema and peripheral pulses strong  ABDOMEN: soft, nontender, no hepatosplenomegaly, no masses and bowel sounds normal  RECTAL: normal sphincter tone, no rectal masses, prostate 3+ normal size, smooth, nontender without nodules or masses  MS: no gross musculoskeletal defects noted, no edema  SKIN: no suspicious lesions or rashes  NEURO: Normal strength and tone, mentation intact and speech normal  PSYCH: mentation appears normal, affect normal/bright    Diagnostic Test Results:  Labs reviewed in Epic    ASSESSMENT / PLAN:       ICD-10-CM    1. Encounter for preventative adult health care examination Z00.00 CBC with platelets     Comprehensive metabolic panel     Lipid panel reflex to direct LDL Fasting     TSH with free T4 reflex     Prostate spec antigen screen     Hemoglobin A1c     *UA reflex to Microscopic     Albumin Random Urine Quantitative with Creat Ratio   2. Need for prophylactic vaccination and inoculation against influenza Z23 INFLUENZA (HIGH DOSE) 3 VALENT VACCINE [69556]     Vaccine Administration, Initial [07446]     ADMIN INFLUENZA (For MEDICARE Patients ONLY) []   3. Need for 23-polyvalent pneumococcal polysaccharide vaccine Z23 THER/PROPH/DIAG INJ, SC/IM     CANCELED: PNEUMOCOCCAL VACCINE,ADULT,SQ OR IM   4. Special screening for malignant neoplasms, colon Z12.11 GASTROENTEROLOGY ADULT REF PROCEDURE ONLY Fawad Cedeno [phone]; [facility]   5. Type 2 diabetes mellitus with complication, without long-term current use of insulin (H) E11.8 glipiZIDE (GLUCOTROL XL) 10 MG 24 hr tablet     losartan (COZAAR) 100 MG tablet     metFORMIN (GLUCOPHAGE-XR) 500 MG 24 hr tablet     Hemoglobin A1c     *UA reflex to Microscopic     Albumin Random Urine Quantitative with Creat Ratio     OFFICE/OUTPT VISIT,EST,LEVL III   6. Ischemic cardiomyopathy I25.5 losartan (COZAAR) 100 MG tablet     atorvastatin (LIPITOR) 40 MG  "tablet     OFFICE/OUTPT VISIT,EST,LEVL III   7. Benign essential hypertension I10 losartan (COZAAR) 100 MG tablet     CBC with platelets     Comprehensive metabolic panel     Lipid panel reflex to direct LDL Fasting     TSH with free T4 reflex     OFFICE/OUTPT VISIT,EST,LEVL III   8. S/P CABG (coronary artery bypass graft) Z95.1 metoprolol succinate ER (TOPROL-XL) 25 MG 24 hr tablet     Lipid panel reflex to direct LDL Fasting   9. Benign prostatic hyperplasia with urinary frequency N40.1 finasteride (PROSCAR) 5 MG tablet    R35.0    10. Screening for prostate cancer Z12.5 Prostate spec antigen screen   11. Vitamin D deficiency E55.9 Vitamin D Deficiency   12. Paresthesia R20.2 Vitamin B12     Assess lab work   Cont treatment   Start on Proscar, advised for side effects , assess PSA     COUNSELING:  Reviewed preventive health counseling, as reflected in patient instructions       Regular exercise       Healthy diet/nutrition       Vision screening       Hearing screening       Colon cancer screening       Prostate cancer screening    Estimated body mass index is 24.19 kg/m  as calculated from the following:    Height as of 10/10/18: 1.778 m (5' 10\").    Weight as of 10/10/18: 76.5 kg (168 lb 9.6 oz).         reports that he has never smoked. He has never used smokeless tobacco.      Appropriate preventive services were discussed with this patient, including applicable screening as appropriate for cardiovascular disease, diabetes, osteopenia/osteoporosis, and glaucoma.  As appropriate for age/gender, discussed screening for colorectal cancer, prostate cancer, breast cancer, and cervical cancer. Checklist reviewing preventive services available has been given to the patient.    Reviewed patients plan of care and provided an AVS. The Intermediate Care Plan ( asthma action plan, low back pain action plan, and migraine action plan) for Adal meets the Care Plan requirement. This Care Plan has been established and " reviewed with the Patient and spouse.    Counseling Resources:  ATP IV Guidelines  Pooled Cohorts Equation Calculator  Breast Cancer Risk Calculator  FRAX Risk Assessment  ICSI Preventive Guidelines  Dietary Guidelines for Americans, 2010  USDA's MyPlate  ASA Prophylaxis  Lung CA Screening    Alvaro Lindo MD  [facility]    Identified Health Risks:

## 2019-11-19 LAB
ALBUMIN SERPL-MCNC: 3.7 G/DL (ref 3.4–5)
ALP SERPL-CCNC: 86 U/L (ref 40–150)
ALT SERPL W P-5'-P-CCNC: 26 U/L (ref 0–70)
ANION GAP SERPL CALCULATED.3IONS-SCNC: 5 MMOL/L (ref 3–14)
AST SERPL W P-5'-P-CCNC: 12 U/L (ref 0–45)
BILIRUB SERPL-MCNC: 1.4 MG/DL (ref 0.2–1.3)
BUN SERPL-MCNC: 19 MG/DL (ref 7–30)
CALCIUM SERPL-MCNC: 9.5 MG/DL (ref 8.5–10.1)
CHLORIDE SERPL-SCNC: 108 MMOL/L (ref 94–109)
CHOLEST SERPL-MCNC: 102 MG/DL
CK SERPL-CCNC: 64 U/L (ref 30–300)
CO2 SERPL-SCNC: 25 MMOL/L (ref 20–32)
CREAT SERPL-MCNC: 1.43 MG/DL (ref 0.66–1.25)
CREAT UR-MCNC: 125 MG/DL
DEPRECATED CALCIDIOL+CALCIFEROL SERPL-MC: 28 UG/L (ref 20–75)
GFR SERPL CREATININE-BSD FRML MDRD: 48 ML/MIN/{1.73_M2}
GLUCOSE SERPL-MCNC: 142 MG/DL (ref 70–99)
HDLC SERPL-MCNC: 31 MG/DL
LDLC SERPL CALC-MCNC: 55 MG/DL
MICROALBUMIN UR-MCNC: 195 MG/L
MICROALBUMIN/CREAT UR: 156 MG/G CR (ref 0–17)
NONHDLC SERPL-MCNC: 71 MG/DL
POTASSIUM SERPL-SCNC: 4.8 MMOL/L (ref 3.4–5.3)
PROT SERPL-MCNC: 7.4 G/DL (ref 6.8–8.8)
PSA SERPL-ACNC: 3.31 UG/L (ref 0–4)
SODIUM SERPL-SCNC: 138 MMOL/L (ref 133–144)
TRIGL SERPL-MCNC: 79 MG/DL
TSH SERPL DL<=0.005 MIU/L-ACNC: 2.95 MU/L (ref 0.4–4)

## 2019-12-10 ENCOUNTER — HOSPITAL ENCOUNTER (OUTPATIENT)
Facility: CLINIC | Age: 74
Discharge: HOME | End: 2019-12-10
Attending: INTERNAL MEDICINE | Admitting: INTERNAL MEDICINE
Payer: MEDICARE

## 2019-12-10 VITALS
OXYGEN SATURATION: 98 % | HEART RATE: 67 BPM | DIASTOLIC BLOOD PRESSURE: 86 MMHG | SYSTOLIC BLOOD PRESSURE: 123 MMHG | RESPIRATION RATE: 16 BRPM

## 2019-12-10 LAB
COLONOSCOPY: NORMAL
GLUCOSE BLDC GLUCOMTR-MCNC: 166 MG/DL (ref 70–99)

## 2019-12-10 PROCEDURE — 82962 GLUCOSE BLOOD TEST: CPT

## 2019-12-10 PROCEDURE — 88305 TISSUE EXAM BY PATHOLOGIST: CPT | Performed by: INTERNAL MEDICINE

## 2019-12-10 PROCEDURE — 25000128 H RX IP 250 OP 636: Performed by: INTERNAL MEDICINE

## 2019-12-10 PROCEDURE — 88305 TISSUE EXAM BY PATHOLOGIST: CPT | Mod: 26 | Performed by: INTERNAL MEDICINE

## 2019-12-10 PROCEDURE — 45385 COLONOSCOPY W/LESION REMOVAL: CPT | Mod: PT | Performed by: INTERNAL MEDICINE

## 2019-12-10 PROCEDURE — G0500 MOD SEDAT ENDO SERVICE >5YRS: HCPCS | Performed by: INTERNAL MEDICINE

## 2019-12-10 RX ORDER — NALOXONE HYDROCHLORIDE 0.4 MG/ML
.1-.4 INJECTION, SOLUTION INTRAMUSCULAR; INTRAVENOUS; SUBCUTANEOUS
Status: DISCONTINUED | OUTPATIENT
Start: 2019-12-10 | End: 2019-12-10 | Stop reason: HOSPADM

## 2019-12-10 RX ORDER — ONDANSETRON 2 MG/ML
4 INJECTION INTRAMUSCULAR; INTRAVENOUS EVERY 6 HOURS PRN
Status: DISCONTINUED | OUTPATIENT
Start: 2019-12-10 | End: 2019-12-10 | Stop reason: HOSPADM

## 2019-12-10 RX ORDER — FENTANYL CITRATE 50 UG/ML
INJECTION, SOLUTION INTRAMUSCULAR; INTRAVENOUS PRN
Status: DISCONTINUED | OUTPATIENT
Start: 2019-12-10 | End: 2019-12-10 | Stop reason: HOSPADM

## 2019-12-10 RX ORDER — LIDOCAINE 40 MG/G
CREAM TOPICAL
Status: DISCONTINUED | OUTPATIENT
Start: 2019-12-10 | End: 2019-12-10 | Stop reason: HOSPADM

## 2019-12-10 RX ORDER — ONDANSETRON 2 MG/ML
4 INJECTION INTRAMUSCULAR; INTRAVENOUS
Status: DISCONTINUED | OUTPATIENT
Start: 2019-12-10 | End: 2019-12-10 | Stop reason: HOSPADM

## 2019-12-10 RX ORDER — ONDANSETRON 4 MG/1
4 TABLET, ORALLY DISINTEGRATING ORAL EVERY 6 HOURS PRN
Status: DISCONTINUED | OUTPATIENT
Start: 2019-12-10 | End: 2019-12-10 | Stop reason: HOSPADM

## 2019-12-10 RX ORDER — FLUMAZENIL 0.1 MG/ML
0.2 INJECTION, SOLUTION INTRAVENOUS
Status: DISCONTINUED | OUTPATIENT
Start: 2019-12-10 | End: 2019-12-10 | Stop reason: HOSPADM

## 2019-12-10 NOTE — LETTER
November 25, 2019      Adal Bates  [address] [place] [id number]        Dear Adal,       Thank you for choosing [facility]. You are scheduled for the following service(s).   Please be aware that coverage of these services is subject to the terms and limitations of your health insurance plan.  Call member services at your health plan with any benefit or coverage questions.  Date:  12-10-19       Procedure: COLONOSCOPY  Doctor:   Raissa         Arrival Time:  1030   *check in at Emergency/Endoscopy desk*  Procedure Time:  1100    Location:   [facility]        Endoscopy Department, First Floor (Enter through ER Doors) *         [address] [place], [place] [address]      [phone] or [phone] ([facility]) to reschedule        NuLYTELY  PREP  Colonoscopy is the most accurate test to detect colon polyps and colon cancer; and the only test where polyps can be removed. During this procedure, a doctor examines the lining of your large intestine and rectum through a flexible tube.         Transportation  You must arrange for a ride for the day of your procedure with a responsible adult. A taxi , Uber, etc, is not an option unless you are accompanied by a responsible adult. If you fail to arrange transportation with a responsible adult, your procedure will be cancelled and rescheduled.  Fill your enclosed prescription for NuLYTELY  at your local pharmacy. Please call our office at [phone] if you did not receive a prescription.      PREPARATION FOR COLONOSCOPY    7 days before:    Discontinue fiber supplements and medications containing iron. This includes Metamucil  and Fibercon ; and multivitamins with iron.  3 days before:    Begin a low-fiber diet. A low-fiber diet helps making the cleanout more effective. For additional details on low-fiber diet, please refer to the table on the last page.  2 days before:    Continue the low-fiber diet.     Drink  at least 8 glasses of water throughout the day.     Stop eating solid foods at 11:45 pm.  1. 1 day before:    In the morning: begin a clear liquid diet (liquids you can see through).     Examples of a clear liquid diet include: water, clear broth or bouillon, Gatorade, Pedialyte or Powerade, carbonated and non-carbonated soft drinks (Sprite , 7-Up , ginger ale), strained fruit juices without pulp (apple, white grape, white cranberry), Jell-O  and popsicles.     The following are not allowed on a clear liquid diet: red liquids, alcoholic beverages, , dairy products (milk, creamer, and yogurt), protein shakes,  juice with pulp and chewing tobacco.    At 4pm: drink 1 (one) 8 oz glass of NuLYTELY  solution every 15 minutes until the bottle (approximately 16 glasses of 8 oz) is gone. Keep the solution refrigerated. Do not drink any other liquids while you are drinking the NuLYTELY  solution.    Over the course of the evening, drink an additional   liter of clear liquids and continue clear liquid diet.    COLON CLEANSING TIPS: drink adequate amounts of fluids before and after your colon cleansing to prevent dehydration. Stay near a toilet because you will have diarrhea. Even if you are sitting on the toilet, continue to drink the cleansing solution every 15 minutes. If you feel nauseous or vomit, rinse your mouth with water, take a 15 to 30-minute-break and then continue drinking the solution. You will be uncomfortable until the stool has flushed from your colon (in about 2 to 4 hours). You may feel chilled.    DAY OF YOUR PROCEDURE  You may take all of your morning medications including blood pressure medications, blood thinners (if you have not been instructed to stop these by our office), methadone, and anti-seizure medications with sips of water 3 hours prior to your procedure or earlier. Do not take insulin or vitamins prior to your procedure. Continue the clear liquid diet.      4 hours prior:   o STOP consuming all  liquids   o Do not take anything by mouth during this time.   o Allow extra time to travel to your procedure as you may need to stop and use a restroom along the way.  You are ready for the procedure, if you followed all instructions and your stool is no longer formed, but clear or yellow liquid. If you are unsure whether your colon is clean, please call our office at [phone] before you leave for your appointment.    Bring the following to your procedure:  - Insurance Card/Photo ID.   - List of current medications including over-the-counter medications and supplements.   - Your rescue inhaler if you currently use one to control asthma.    Canceling or rescheduling your appointment:   If you must cancel or reschedule your appointment, please call [phone] as soon as possible.    COLONOSCOPY PRE-PROCEDURE CHECKLIST  If you have diabetes, ask your regular doctor for diet and medication restrictions.  If you take an anticoagulant or anti-platelet medication (such as Coumadin , Lovenox , Pradaxa , Xarelto , Eliquis , etc.), please call your primary doctor for advice on holding this medication.  If you take aspirin you may continue to do so.  If you are or may be pregnant, please discuss the risks and benefits of this procedure with your doctor.    What happens during a colonoscopy?    Plan to spend up to two hours, starting at registration time, at the endoscopy center the day of your procedure. The colonoscopy takes an average of 15 to 30 minutes. Recovery time is about 30 minutes.    Before the exam:    You will change into a gown.    Your medical history and medication list will be reviewed with you, unless that has been done over the phone prior to the procedure.     A nurse will insert an intravenous (IV) line into your hand or arm.    The doctor will meet with you and will give you a consent form to sign.  1.   2. During the exam:     Medicine will be given through the IV line to help you relax.     Your  heart rate and oxygen levels will be monitored. If your blood pressure is low, you may be given fluids through the IV line.     The doctor will insert a flexible hollow tube, called a colonoscope, into your rectum. The scope will be advanced slowly through the large intestine (colon).    You may have a feeling of fullness or pressure.     If an abnormal tissue or a polyp is found, the doctor may remove it through the endoscope for closer examination, or biopsy. Tissue removal is painless.    After the exam:           Any tissue samples removed during the exam will be sent to a lab for evaluation. It may take 5-7 working days for you to be notified of the results.     A nurse will provide you with complete discharge instructions before you leave the endoscopy center. Be sure to ask the nurse for specific instructions if you take blood thinners such as Aspirin, Coumadin or Plavix.     The doctor will prepare a full report for you and for the physician who referred you for the procedure.     Your doctor will talk with you about the initial results of your exam.      Medication given during the exam will prohibit you from driving for the rest of the day.     Following the exam, you may resume your normal diet. Your first meal should be light, no greasy foods. Avoid alcohol until the next day.     You may resume your regular activities the day after the procedure.     LOW-FIBER DIET  Foods RECOMMENDED Foods to AVOID   Breads, Cereal, Rice and Pasta:   White bread, rolls, biscuits, croissant and dai toast.   Waffles, Chinese toast and pancakes.   White rice, noodles, pasta, macaroni and peeled cooked potatoes.   Plain crackers and saltines.   Cooked cereals: farina, cream of rice.   Cold cereals: Puffed Rice , Rice Krispies , Corn Flakes  and Special K    Breads, Cereal, Rice and Pasta:   Breads or rolls with nuts, seeds or fruit.   Whole wheat, pumpernickel, rye breads and cornbread.   Potatoes with skin, brown or wild  rice, and kasha (buckwheat).     Vegetables:   Tender cooked and canned vegetables without seeds: carrots, asparagus tips, green or wax beans, pumpkin, spinach, lima beans. Vegetables:   Raw or steamed vegetables.   Vegetables with seeds.   Sauerkraut.   Winter squash, peas, broccoli, Brussel sprouts, cabbage, onions, cauliflower, baked beans, peas and corn.   Fruits:   Strained fruit juice.   Canned fruit, except pineapple.   Ripe bananas and melon. Fruits:   Prunes and prune juice.   Raw fruits.   Dried fruits: figs, dates and raisins.   Milk/Dairy:   Milk: plain or flavored.   Yogurt, custard and ice cream.   Cheese and cottage cheese Milk/Dairy:     Meat and other proteins:   ground, well-cooked tender beef, lamb, ham, veal, pork, fish, poultry and organ meats.   Eggs.   Peanut butter without nuts. Meat and other proteins:   Tough, fibrous meats with gristle.   Dry beans, peas and lentils.   Peanut butter with nuts.   Tofu.   Fats, Snack, Sweets, Condiments and Beverages:   Margarine, butter, oils, mayonnaise, sour cream and salad dressing, plain gravy.   Sugar, hard candy, clear jelly, honey and syrup.   Spices, cooked herbs, bouillon, broth and soups made with allowed vegetable, ketchup and mustard.   Coffee, tea and carbonated drinks.   Plain cakes, cookies and pretzels.   Gelatin, plain puddings, custard, ice cream, sherbet and popsicles. Fats, Snack, Sweets, Condiments and Beverages:   Nuts, seeds and coconut.   Jam, marmalade and preserves.   Pickles, olives, relish and horseradish.   All desserts containing nuts, seeds, dried fruit and coconut; or made from whole grains or bran.   Candy made with nuts or seeds.   Popcorn.       DIRECTIONS TO THE ENDOSCOPY DEPARTMENT    From the north ([facility])  Take [address] South, exit on [address] . Get into the left hand heaven, turn left (east), go one-half mile to [address] and turn left. Go north to the first stoplight, take a right on  [address] and follow it to the Emergency entrance.    From the south ([facility])  Take 35N to the 35E split and exit on [address] . On [address] , turn left (west) to [address]. Turn right (north) on [address]. Go north to the first stoplight, take a right on [address] and follow it to the Emergency entrance.    From the east via 35E ([facility])  Take [address]E south to [place] [address] exit. Turn right on [address] . Go west to [address]. Turn right (north) on [address]. Go to the first stoplight, take a right and follow on [address] to the Emergency entrance.    From the east via [address] ([facility])  Take [address] to [address]. Turn left (south) on [address] to [address]. Turn left (east) on [address] and follow it to the Emergency entrance.    From the west via [address] ([place])  Take [address] east to [address]. Turn right (south) on [address] to [address]. Turn left (east) on [address] and follow it to the Emergency entrance.

## 2019-12-10 NOTE — DISCHARGE INSTRUCTIONS
Understanding Colon and Rectal Polyps     The colon has a smooth lining composed of millions of cells.     The colon (also called the large intestine) is a muscular tube that forms the last part of the digestive tract. It absorbs water and stores food waste. The colon is about 4 to 6 feet long. The rectum is the last 6 inches of the colon. The colon and rectum have a smooth lining composed of millions of cells. Changes in these cells can lead to growths in the colon that can become cancerous and should be removed.     When the Colon Lining Changes  Changes that occur in the cells that line the colon or rectum can lead to growths called polyps. Over a period of years, polyps can turn cancerous. Removing polyps early may prevent cancer from ever forming.      Polyps  Polyps are fleshy clumps of tissue that form on the lining of the colon or rectum. Small polyps are usually benign (not cancerous). However, over time, cells in a polyp can change and become cancerous. The larger a polyp grows, the more likely this is to happen. Also, certain types of polyps known as adenomatous polyps are considered premalignant. This means that they will almost always become cancerous if they re not removed.          Cancer  Almost all colorectal cancers start when polyp cells begin growing abnormally. As a cancerous tumor grows, it may involve more and more of the colon or rectum. In time, cancer can also grow beyond the colon or rectum and spread to nearby organs or to glands called lymph nodes. The cells can also travel to other parts of the body. This is known as metastasis. The earlier a cancerous tumor is removed, the better the chance of preventing its spread.        [id number] [facility], [address] [place] [address], [place] [address]. All rights reserved. This information is not intended as a substitute for professional medical care. Always follow your healthcare professional's instructions.
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
## 2019-12-11 LAB — COPATH REPORT: NORMAL

## 2020-03-21 ENCOUNTER — ANESTHESIA (OUTPATIENT)
Dept: SURGERY | Facility: CLINIC | Age: 75
End: 2020-03-21
Payer: MEDICARE

## 2020-03-21 ENCOUNTER — ANESTHESIA EVENT (OUTPATIENT)
Dept: SURGERY | Facility: CLINIC | Age: 75
End: 2020-03-21
Payer: MEDICARE

## 2020-03-21 ENCOUNTER — APPOINTMENT (OUTPATIENT)
Dept: CT IMAGING | Facility: CLINIC | Age: 75
End: 2020-03-21
Attending: EMERGENCY MEDICINE
Payer: MEDICARE

## 2020-03-21 ENCOUNTER — HOSPITAL ENCOUNTER (OUTPATIENT)
Facility: CLINIC | Age: 75
Discharge: HOME | End: 2020-03-21
Attending: EMERGENCY MEDICINE | Admitting: SURGERY
Payer: MEDICARE

## 2020-03-21 ENCOUNTER — NURSE TRIAGE (OUTPATIENT)
Dept: NURSING | Facility: CLINIC | Age: 75
End: 2020-03-21

## 2020-03-21 VITALS
DIASTOLIC BLOOD PRESSURE: 88 MMHG | TEMPERATURE: 98.4 F | RESPIRATION RATE: 12 BRPM | BODY MASS INDEX: 25.09 KG/M2 | SYSTOLIC BLOOD PRESSURE: 154 MMHG | WEIGHT: 165 LBS | HEART RATE: 61 BPM | OXYGEN SATURATION: 100 %

## 2020-03-21 DIAGNOSIS — K35.30 ACUTE APPENDICITIS WITH LOCALIZED PERITONITIS, WITHOUT PERFORATION, ABSCESS, OR GANGRENE: ICD-10-CM

## 2020-03-21 LAB
ALBUMIN SERPL-MCNC: 3.3 G/DL (ref 3.4–5)
ALBUMIN UR-MCNC: 50 MG/DL
ALP SERPL-CCNC: 78 U/L (ref 40–150)
ALT SERPL W P-5'-P-CCNC: 19 U/L (ref 0–70)
ANION GAP SERPL CALCULATED.3IONS-SCNC: 4 MMOL/L (ref 3–14)
APPEARANCE UR: CLEAR
AST SERPL W P-5'-P-CCNC: 10 U/L (ref 0–45)
BASOPHILS # BLD AUTO: 0 10E9/L (ref 0–0.2)
BASOPHILS NFR BLD AUTO: 0.5 %
BILIRUB DIRECT SERPL-MCNC: 0.3 MG/DL (ref 0–0.2)
BILIRUB SERPL-MCNC: 1.8 MG/DL (ref 0.2–1.3)
BILIRUB UR QL STRIP: NEGATIVE
BUN SERPL-MCNC: 16 MG/DL (ref 7–30)
CALCIUM SERPL-MCNC: 9.3 MG/DL (ref 8.5–10.1)
CHLORIDE SERPL-SCNC: 102 MMOL/L (ref 94–109)
CO2 SERPL-SCNC: 28 MMOL/L (ref 20–32)
COLOR UR AUTO: ABNORMAL
CREAT BLD-MCNC: 1.5 MG/DL (ref 0.66–1.25)
CREAT SERPL-MCNC: 1.35 MG/DL (ref 0.66–1.25)
DIFFERENTIAL METHOD BLD: ABNORMAL
EOSINOPHIL # BLD AUTO: 0.1 10E9/L (ref 0–0.7)
EOSINOPHIL NFR BLD AUTO: 1 %
ERYTHROCYTE [DISTWIDTH] IN BLOOD BY AUTOMATED COUNT: 14 % (ref 10–15)
GFR SERPL CREATININE-BSD FRML MDRD: 46 ML/MIN/{1.73_M2}
GFR SERPL CREATININE-BSD FRML MDRD: 51 ML/MIN/{1.73_M2}
GLUCOSE BLDC GLUCOMTR-MCNC: 124 MG/DL (ref 70–99)
GLUCOSE BLDC GLUCOMTR-MCNC: 202 MG/DL (ref 70–99)
GLUCOSE SERPL-MCNC: 195 MG/DL (ref 70–99)
GLUCOSE UR STRIP-MCNC: 200 MG/DL
HCT VFR BLD AUTO: 47.4 % (ref 40–53)
HGB BLD-MCNC: 14.7 G/DL (ref 13.3–17.7)
HGB UR QL STRIP: ABNORMAL
IMM GRANULOCYTES # BLD: 0 10E9/L (ref 0–0.4)
IMM GRANULOCYTES NFR BLD: 0.2 %
KETONES UR STRIP-MCNC: NEGATIVE MG/DL
LEUKOCYTE ESTERASE UR QL STRIP: NEGATIVE
LIPASE SERPL-CCNC: 364 U/L (ref 73–393)
LYMPHOCYTES # BLD AUTO: 1.5 10E9/L (ref 0.8–5.3)
LYMPHOCYTES NFR BLD AUTO: 16.8 %
MCH RBC QN AUTO: 27.1 PG (ref 26.5–33)
MCHC RBC AUTO-ENTMCNC: 31 G/DL (ref 31.5–36.5)
MCV RBC AUTO: 88 FL (ref 78–100)
MONOCYTES # BLD AUTO: 0.8 10E9/L (ref 0–1.3)
MONOCYTES NFR BLD AUTO: 8.8 %
NEUTROPHILS # BLD AUTO: 6.3 10E9/L (ref 1.6–8.3)
NEUTROPHILS NFR BLD AUTO: 72.7 %
NITRATE UR QL: NEGATIVE
NRBC # BLD AUTO: 0 10*3/UL
NRBC BLD AUTO-RTO: 0 /100
PH UR STRIP: 6 PH (ref 5–7)
PLATELET # BLD AUTO: 193 10E9/L (ref 150–450)
POTASSIUM SERPL-SCNC: 4.4 MMOL/L (ref 3.4–5.3)
PROT SERPL-MCNC: 7.4 G/DL (ref 6.8–8.8)
RBC # BLD AUTO: 5.42 10E12/L (ref 4.4–5.9)
RBC #/AREA URNS AUTO: 1 /HPF (ref 0–2)
SODIUM SERPL-SCNC: 134 MMOL/L (ref 133–144)
SOURCE: ABNORMAL
SP GR UR STRIP: 1.01 (ref 1–1.03)
UROBILINOGEN UR STRIP-MCNC: NORMAL MG/DL (ref 0–2)
WBC # BLD AUTO: 8.6 10E9/L (ref 4–11)
WBC #/AREA URNS AUTO: <1 /HPF (ref 0–5)

## 2020-03-21 PROCEDURE — 25000128 H RX IP 250 OP 636: Performed by: ANESTHESIOLOGY

## 2020-03-21 PROCEDURE — 44950 APPENDECTOMY: CPT | Mod: AS | Performed by: PHYSICIAN ASSISTANT

## 2020-03-21 PROCEDURE — 88304 TISSUE EXAM BY PATHOLOGIST: CPT | Performed by: SURGERY

## 2020-03-21 PROCEDURE — 25000132 ZZH RX MED GY IP 250 OP 250 PS 637: Mod: GY | Performed by: ANESTHESIOLOGY

## 2020-03-21 PROCEDURE — 36000050 ZZH SURGERY LEVEL 2 1ST 30 MIN: Performed by: SURGERY

## 2020-03-21 PROCEDURE — 71000012 ZZH RECOVERY PHASE 1 LEVEL 1 FIRST HR: Performed by: SURGERY

## 2020-03-21 PROCEDURE — 25000128 H RX IP 250 OP 636: Performed by: EMERGENCY MEDICINE

## 2020-03-21 PROCEDURE — 25000128 H RX IP 250 OP 636: Performed by: NURSE ANESTHETIST, CERTIFIED REGISTERED

## 2020-03-21 PROCEDURE — 25000132 ZZH RX MED GY IP 250 OP 250 PS 637: Mod: GY | Performed by: EMERGENCY MEDICINE

## 2020-03-21 PROCEDURE — 96365 THER/PROPH/DIAG IV INF INIT: CPT | Mod: 59

## 2020-03-21 PROCEDURE — 88304 TISSUE EXAM BY PATHOLOGIST: CPT | Mod: 26 | Performed by: SURGERY

## 2020-03-21 PROCEDURE — 82248 BILIRUBIN DIRECT: CPT | Performed by: EMERGENCY MEDICINE

## 2020-03-21 PROCEDURE — 80053 COMPREHEN METABOLIC PANEL: CPT | Performed by: EMERGENCY MEDICINE

## 2020-03-21 PROCEDURE — 99204 OFFICE O/P NEW MOD 45 MIN: CPT | Mod: 57 | Performed by: SURGERY

## 2020-03-21 PROCEDURE — 00000146 ZZHCL STATISTIC GLUCOSE BY METER IP

## 2020-03-21 PROCEDURE — 25000125 ZZHC RX 250: Performed by: NURSE ANESTHETIST, CERTIFIED REGISTERED

## 2020-03-21 PROCEDURE — 40000306 ZZH STATISTIC PRE PROC ASSESS II: Performed by: SURGERY

## 2020-03-21 PROCEDURE — 81001 URINALYSIS AUTO W/SCOPE: CPT | Performed by: EMERGENCY MEDICINE

## 2020-03-21 PROCEDURE — 44950 APPENDECTOMY: CPT | Performed by: SURGERY

## 2020-03-21 PROCEDURE — 99285 EMERGENCY DEPT VISIT HI MDM: CPT | Mod: 25

## 2020-03-21 PROCEDURE — 71000013 ZZH RECOVERY PHASE 1 LEVEL 1 EA ADDTL HR: Performed by: SURGERY

## 2020-03-21 PROCEDURE — 25800030 ZZH RX IP 258 OP 636: Performed by: NURSE ANESTHETIST, CERTIFIED REGISTERED

## 2020-03-21 PROCEDURE — 83690 ASSAY OF LIPASE: CPT | Performed by: EMERGENCY MEDICINE

## 2020-03-21 PROCEDURE — 25000125 ZZHC RX 250: Performed by: EMERGENCY MEDICINE

## 2020-03-21 PROCEDURE — 25000128 H RX IP 250 OP 636: Performed by: SURGERY

## 2020-03-21 PROCEDURE — A9270 NON-COVERED ITEM OR SERVICE: HCPCS | Mod: GY | Performed by: NURSE ANESTHETIST, CERTIFIED REGISTERED

## 2020-03-21 PROCEDURE — 25000132 ZZH RX MED GY IP 250 OP 250 PS 637: Mod: GY | Performed by: NURSE ANESTHETIST, CERTIFIED REGISTERED

## 2020-03-21 PROCEDURE — 74177 CT ABD & PELVIS W/CONTRAST: CPT | Mod: XS

## 2020-03-21 PROCEDURE — 25000125 ZZHC RX 250: Performed by: SURGERY

## 2020-03-21 PROCEDURE — 36000052 ZZH SURGERY LEVEL 2 EA 15 ADDTL MIN: Performed by: SURGERY

## 2020-03-21 PROCEDURE — 85025 COMPLETE CBC W/AUTO DIFF WBC: CPT | Performed by: EMERGENCY MEDICINE

## 2020-03-21 PROCEDURE — 82565 ASSAY OF CREATININE: CPT | Mod: 91

## 2020-03-21 PROCEDURE — 27210794 ZZH OR GENERAL SUPPLY STERILE: Performed by: SURGERY

## 2020-03-21 PROCEDURE — 71000027 ZZH RECOVERY PHASE 2 EACH 15 MINS: Performed by: SURGERY

## 2020-03-21 PROCEDURE — 37000009 ZZH ANESTHESIA TECHNICAL FEE, EACH ADDTL 15 MIN: Performed by: SURGERY

## 2020-03-21 PROCEDURE — 74174 CTA ABD&PLVS W/CONTRAST: CPT

## 2020-03-21 PROCEDURE — 37000008 ZZH ANESTHESIA TECHNICAL FEE, 1ST 30 MIN: Performed by: SURGERY

## 2020-03-21 PROCEDURE — 93005 ELECTROCARDIOGRAM TRACING: CPT | Mod: XU

## 2020-03-21 RX ORDER — PROPOFOL 10 MG/ML
INJECTION, EMULSION INTRAVENOUS PRN
Status: DISCONTINUED | OUTPATIENT
Start: 2020-03-21 | End: 2020-03-21

## 2020-03-21 RX ORDER — CEFOXITIN 2 G/1
2 INJECTION, POWDER, FOR SOLUTION INTRAVENOUS ONCE
Status: COMPLETED | OUTPATIENT
Start: 2020-03-21 | End: 2020-03-21

## 2020-03-21 RX ORDER — GLYCOPYRROLATE 0.2 MG/ML
INJECTION, SOLUTION INTRAMUSCULAR; INTRAVENOUS PRN
Status: DISCONTINUED | OUTPATIENT
Start: 2020-03-21 | End: 2020-03-21

## 2020-03-21 RX ORDER — FENTANYL CITRATE 50 UG/ML
25-50 INJECTION, SOLUTION INTRAMUSCULAR; INTRAVENOUS EVERY 5 MIN PRN
Status: DISCONTINUED | OUTPATIENT
Start: 2020-03-21 | End: 2020-03-21 | Stop reason: HOSPADM

## 2020-03-21 RX ORDER — ALBUTEROL SULFATE 0.83 MG/ML
2.5 SOLUTION RESPIRATORY (INHALATION) EVERY 4 HOURS PRN
Status: DISCONTINUED | OUTPATIENT
Start: 2020-03-21 | End: 2020-03-21 | Stop reason: HOSPADM

## 2020-03-21 RX ORDER — METOPROLOL TARTRATE 1 MG/ML
1-2 INJECTION, SOLUTION INTRAVENOUS EVERY 5 MIN PRN
Status: DISCONTINUED | OUTPATIENT
Start: 2020-03-21 | End: 2020-03-21 | Stop reason: HOSPADM

## 2020-03-21 RX ORDER — MEPERIDINE HYDROCHLORIDE 25 MG/ML
12.5 INJECTION INTRAMUSCULAR; INTRAVENOUS; SUBCUTANEOUS
Status: CANCELLED | OUTPATIENT
Start: 2020-03-21

## 2020-03-21 RX ORDER — IOPAMIDOL 755 MG/ML
500 INJECTION, SOLUTION INTRAVASCULAR ONCE
Status: COMPLETED | OUTPATIENT
Start: 2020-03-21 | End: 2020-03-21

## 2020-03-21 RX ORDER — AMOXICILLIN 250 MG
1-2 CAPSULE ORAL 2 TIMES DAILY
Qty: 30 TABLET | Refills: 0 | Status: SHIPPED | OUTPATIENT
Start: 2020-03-21 | End: 2020-04-08

## 2020-03-21 RX ORDER — NALOXONE HYDROCHLORIDE 0.4 MG/ML
.1-.4 INJECTION, SOLUTION INTRAMUSCULAR; INTRAVENOUS; SUBCUTANEOUS
Status: CANCELLED | OUTPATIENT
Start: 2020-03-21 | End: 2020-03-22

## 2020-03-21 RX ORDER — FENTANYL CITRATE 50 UG/ML
25-50 INJECTION, SOLUTION INTRAMUSCULAR; INTRAVENOUS
Status: CANCELLED | OUTPATIENT
Start: 2020-03-21

## 2020-03-21 RX ORDER — SODIUM CHLORIDE, SODIUM LACTATE, POTASSIUM CHLORIDE, CALCIUM CHLORIDE 600; 310; 30; 20 MG/100ML; MG/100ML; MG/100ML; MG/100ML
INJECTION, SOLUTION INTRAVENOUS CONTINUOUS
Status: DISCONTINUED | OUTPATIENT
Start: 2020-03-21 | End: 2020-03-21 | Stop reason: HOSPADM

## 2020-03-21 RX ORDER — BUPIVACAINE HYDROCHLORIDE 2.5 MG/ML
INJECTION, SOLUTION INFILTRATION; PERINEURAL PRN
Status: DISCONTINUED | OUTPATIENT
Start: 2020-03-21 | End: 2020-03-21 | Stop reason: HOSPADM

## 2020-03-21 RX ORDER — SODIUM CHLORIDE, SODIUM LACTATE, POTASSIUM CHLORIDE, CALCIUM CHLORIDE 600; 310; 30; 20 MG/100ML; MG/100ML; MG/100ML; MG/100ML
INJECTION, SOLUTION INTRAVENOUS CONTINUOUS PRN
Status: DISCONTINUED | OUTPATIENT
Start: 2020-03-21 | End: 2020-03-21

## 2020-03-21 RX ORDER — LIDOCAINE 40 MG/G
CREAM TOPICAL
Status: DISCONTINUED | OUTPATIENT
Start: 2020-03-21 | End: 2020-03-21 | Stop reason: HOSPADM

## 2020-03-21 RX ORDER — SODIUM CHLORIDE, SODIUM LACTATE, POTASSIUM CHLORIDE, CALCIUM CHLORIDE 600; 310; 30; 20 MG/100ML; MG/100ML; MG/100ML; MG/100ML
INJECTION, SOLUTION INTRAVENOUS CONTINUOUS
Status: CANCELLED | OUTPATIENT
Start: 2020-03-21

## 2020-03-21 RX ORDER — ALBUTEROL SULFATE 90 UG/1
AEROSOL, METERED RESPIRATORY (INHALATION) PRN
Status: DISCONTINUED | OUTPATIENT
Start: 2020-03-21 | End: 2020-03-21

## 2020-03-21 RX ORDER — HYDROCODONE BITARTRATE AND ACETAMINOPHEN 5; 325 MG/1; MG/1
2 TABLET ORAL
Status: CANCELLED | OUTPATIENT
Start: 2020-03-21

## 2020-03-21 RX ORDER — ONDANSETRON 2 MG/ML
4 INJECTION INTRAMUSCULAR; INTRAVENOUS EVERY 30 MIN PRN
Status: CANCELLED | OUTPATIENT
Start: 2020-03-21

## 2020-03-21 RX ORDER — FENTANYL CITRATE 50 UG/ML
INJECTION, SOLUTION INTRAMUSCULAR; INTRAVENOUS PRN
Status: DISCONTINUED | OUTPATIENT
Start: 2020-03-21 | End: 2020-03-21

## 2020-03-21 RX ORDER — LIDOCAINE HYDROCHLORIDE 10 MG/ML
INJECTION, SOLUTION INFILTRATION; PERINEURAL PRN
Status: DISCONTINUED | OUTPATIENT
Start: 2020-03-21 | End: 2020-03-21

## 2020-03-21 RX ORDER — OXYCODONE HYDROCHLORIDE 5 MG/1
5 TABLET ORAL EVERY 4 HOURS PRN
Status: DISCONTINUED | OUTPATIENT
Start: 2020-03-21 | End: 2020-03-21 | Stop reason: HOSPADM

## 2020-03-21 RX ORDER — HYDROCODONE BITARTRATE AND ACETAMINOPHEN 5; 325 MG/1; MG/1
1-2 TABLET ORAL EVERY 4 HOURS PRN
Qty: 20 TABLET | Refills: 0 | Status: SHIPPED | OUTPATIENT
Start: 2020-03-21 | End: 2020-04-08

## 2020-03-21 RX ORDER — HYDROMORPHONE HYDROCHLORIDE 1 MG/ML
.3-.5 INJECTION, SOLUTION INTRAMUSCULAR; INTRAVENOUS; SUBCUTANEOUS EVERY 10 MIN PRN
Status: CANCELLED | OUTPATIENT
Start: 2020-03-21

## 2020-03-21 RX ORDER — NEOSTIGMINE METHYLSULFATE 1 MG/ML
VIAL (ML) INJECTION PRN
Status: DISCONTINUED | OUTPATIENT
Start: 2020-03-21 | End: 2020-03-21

## 2020-03-21 RX ORDER — MAGNESIUM HYDROXIDE 1200 MG/15ML
LIQUID ORAL PRN
Status: DISCONTINUED | OUTPATIENT
Start: 2020-03-21 | End: 2020-03-21 | Stop reason: HOSPADM

## 2020-03-21 RX ORDER — ONDANSETRON 2 MG/ML
INJECTION INTRAMUSCULAR; INTRAVENOUS PRN
Status: DISCONTINUED | OUTPATIENT
Start: 2020-03-21 | End: 2020-03-21

## 2020-03-21 RX ORDER — ONDANSETRON 4 MG/1
4 TABLET, ORALLY DISINTEGRATING ORAL EVERY 30 MIN PRN
Status: CANCELLED | OUTPATIENT
Start: 2020-03-21

## 2020-03-21 RX ORDER — HYDRALAZINE HYDROCHLORIDE 20 MG/ML
2.5-5 INJECTION INTRAMUSCULAR; INTRAVENOUS EVERY 10 MIN PRN
Status: DISCONTINUED | OUTPATIENT
Start: 2020-03-21 | End: 2020-03-21 | Stop reason: HOSPADM

## 2020-03-21 RX ADMIN — Medication 100 MG: at 15:28

## 2020-03-21 RX ADMIN — FENTANYL CITRATE 100 MCG: 50 INJECTION, SOLUTION INTRAMUSCULAR; INTRAVENOUS at 15:28

## 2020-03-21 RX ADMIN — LIDOCAINE HYDROCHLORIDE 50 MG: 10 INJECTION, SOLUTION INFILTRATION; PERINEURAL at 15:28

## 2020-03-21 RX ADMIN — PROPOFOL 20 MG: 10 INJECTION, EMULSION INTRAVENOUS at 15:52

## 2020-03-21 RX ADMIN — HYDRALAZINE HYDROCHLORIDE 5 MG: 20 INJECTION INTRAMUSCULAR; INTRAVENOUS at 18:32

## 2020-03-21 RX ADMIN — SODIUM CHLORIDE, POTASSIUM CHLORIDE, SODIUM LACTATE AND CALCIUM CHLORIDE: 600; 310; 30; 20 INJECTION, SOLUTION INTRAVENOUS at 15:20

## 2020-03-21 RX ADMIN — LIDOCAINE HYDROCHLORIDE 30 ML: 20 SOLUTION ORAL; TOPICAL at 10:23

## 2020-03-21 RX ADMIN — IOPAMIDOL 80 ML: 755 INJECTION, SOLUTION INTRAVENOUS at 12:33

## 2020-03-21 RX ADMIN — ALBUTEROL SULFATE 6 PUFF: 90 AEROSOL, METERED RESPIRATORY (INHALATION) at 16:40

## 2020-03-21 RX ADMIN — PHENYLEPHRINE HYDROCHLORIDE 100 MCG: 10 INJECTION INTRAVENOUS at 15:28

## 2020-03-21 RX ADMIN — Medication 2 MG: at 16:18

## 2020-03-21 RX ADMIN — FENTANYL CITRATE 50 MCG: 50 INJECTION, SOLUTION INTRAMUSCULAR; INTRAVENOUS at 16:07

## 2020-03-21 RX ADMIN — GLYCOPYRROLATE 0.3 MG: 0.2 INJECTION, SOLUTION INTRAMUSCULAR; INTRAVENOUS at 16:18

## 2020-03-21 RX ADMIN — CEFOXITIN SODIUM 2 G: 2 POWDER, FOR SOLUTION INTRAVENOUS at 14:14

## 2020-03-21 RX ADMIN — PHENYLEPHRINE HYDROCHLORIDE 100 MCG: 10 INJECTION INTRAVENOUS at 15:33

## 2020-03-21 RX ADMIN — PROPOFOL 120 MG: 10 INJECTION, EMULSION INTRAVENOUS at 15:28

## 2020-03-21 RX ADMIN — FENTANYL CITRATE 50 MCG: 50 INJECTION, SOLUTION INTRAMUSCULAR; INTRAVENOUS at 15:57

## 2020-03-21 RX ADMIN — OXYCODONE HYDROCHLORIDE 5 MG: 5 TABLET ORAL at 19:04

## 2020-03-21 RX ADMIN — ONDANSETRON HYDROCHLORIDE 4 MG: 2 INJECTION, SOLUTION INTRAVENOUS at 15:43

## 2020-03-21 RX ADMIN — SODIUM CHLORIDE 80 ML: 9 INJECTION, SOLUTION INTRAVENOUS at 12:33

## 2020-03-21 RX ADMIN — PHENYLEPHRINE HYDROCHLORIDE 100 MCG: 10 INJECTION INTRAVENOUS at 15:37

## 2020-03-21 RX ADMIN — PHENYLEPHRINE HYDROCHLORIDE 100 MCG: 10 INJECTION INTRAVENOUS at 15:42

## 2020-03-21 RX ADMIN — ROCURONIUM BROMIDE 15 MG: 10 INJECTION INTRAVENOUS at 15:53

## 2020-03-21 ASSESSMENT — ENCOUNTER SYMPTOMS
ABDOMINAL PAIN: 1
APPETITE CHANGE: 1
BACK PAIN: 0
COUGH: 0
VOMITING: 0
FEVER: 0
HEMATURIA: 0
CONSTIPATION: 0
DIARRHEA: 0
BLOOD IN STOOL: 0
FREQUENCY: 0
ARTHRALGIAS: 0
ABDOMINAL DISTENTION: 1
SHORTNESS OF BREATH: 0
DYSURIA: 0

## 2020-03-21 NOTE — TELEPHONE ENCOUNTER
She will get him to the ER for evaluation.  His pain is constant, at 3 today, at 4 yesterday. Her son is a nephrologist who told them he should be seen today and they may get a scan to figure out what is going on.  Michelle Zaragoza RN-[facility]      Reason for Disposition    [1] Pain lasts > 10 minutes AND [2] age > 50    Additional Information    Negative: Severe difficulty breathing (e.g., struggling for each breath, speaks in single words)    Negative: Shock suspected (e.g., cold/pale/clammy skin, too weak to stand, low BP, rapid pulse)    Negative: Difficult to awaken or acting confused (e.g., disoriented, slurred speech)    Negative: Passed out (i.e., lost consciousness, collapsed and was not responding)    Negative: Visible sweat on face or sweat dripping down face    Negative: Sounds like a life-threatening emergency to the triager    Negative: Followed an abdomen (stomach) injury    Negative: Chest pain    Negative: [1] SEVERE pain (e.g., excruciating) AND [2] present > 1 hour     Pain at 3    Protocols used: ABDOMINAL PAIN - UPPER-A-AH

## 2020-03-21 NOTE — ANESTHESIA CARE TRANSFER NOTE
Patient: Adal Bates    Procedure(s):  APPENDECTOMY, OPEN    Diagnosis: Appendicitis [K37]  Diagnosis Additional Information: No value filed.    Anesthesia Type:   General     Note:  Airway :Face Mask  Patient transferred to:PACU  Handoff Report: Identifed the Patient, Identified the Reponsible Provider, Reviewed the pertinent medical history, Discussed the surgical course, Reviewed Intra-OP anesthesia mangement and issues during anesthesia, Set expectations for post-procedure period and Allowed opportunity for questions and acknowledgement of understanding      Vitals: (Last set prior to Anesthesia Care Transfer)    CRNA VITALS  3/21/2020 1607 - 3/21/2020 1643      3/21/2020             Pulse:  76    SpO2:  100 %    Resp Rate (observed):  18                Electronically Signed By: Dean Dennis Severson, APRN CRNA  March 21, 2020  4:43 PM

## 2020-03-21 NOTE — ED PROVIDER NOTES
History     Chief Complaint:  Abdominal Pain      HPI   Adal Bates is a 74 year old male with a history of CAD s/p CABG in 2016, ischemic cardiomyopathy, type 2 diabetes, MI, hypertension, LBBB, and CKD, who presents for evaluation of abdominal pain beginning yesterday morning. Patient initially had epigastric discomfort and abdominal distension at onset, though now his pain has shifted to his low abdomen, worse in the right lower quadrant, and he says his abdomen is much softer and less distended now. He rates his pain severity as 6/10 and describes the pain as a dull sensation. Yesterday during the day, he had no appetite, but was able to eat last night. Patient also had right testicular discomfort yesterday though is not experiencing this pain now. He has had a bowel movement and urinated since onset and both have been unremarkable with no blood. He has been taking Tylenol for pain management. Patient did experience urinary retention around 2 months ago, but has had no difficulty urinating recently. He has no known history of ulcers and has occasional acid reflux.  Denies chest pain, shortness of breath, cough, fever,  back pain, changes in bowel movements, changes in urination, emesis, pain radiating down his legs, or any other concerns. No tobacco, alcohol, or illegal drug use.     Allergies:  Ace inhibitors      Medications:    Aspirin   Lipitor   Coenzyme Q-10  Proscar  Glucotrol  Cozaar  Metformin   Cozaar   Glipizide  Toprol    Past Medical History:    CAD  CKD  DM 2  MI  Hypercholesteremia  HTN  Ischemic cardiomyopathy  Laceration of renal artery  Laceration of spleen   LBBB  Polycystic kidney disease  Traumatic subdural hematoma     Past Surgical History:    [place] holes, skull  Bypass graft coronary artery  Angioplasty     Family History:    CVD  DM  CAD    Social History:  Smoking status: never   Alcohol use: yes   Drug use: no   PCP: Alvaro Lindo  Marital Status:        Review of Systems    Constitutional: Positive for appetite change. Negative for fever.   Respiratory: Negative for cough and shortness of breath.    Cardiovascular: Negative for chest pain.   Gastrointestinal: Positive for abdominal distention and abdominal pain. Negative for blood in stool, constipation, diarrhea and vomiting.   Genitourinary: Positive for testicular pain. Negative for dysuria, frequency, hematuria and urgency.   Musculoskeletal: Negative for arthralgias and back pain.   All other systems reviewed and are negative.      Physical Exam     Patient Vitals for the past 24 hrs:   BP Temp Temp src Pulse Heart Rate Resp SpO2 Weight   03/21/20 0950 (!) 156/104 97.5  F (36.4  C) Oral 70 70 16 96 % 74.8 kg (165 lb)        Physical Exam  Constitutional: Well developed, nontox appearance  Head: Atraumatic.   Mouth/Throat: Oropharynx is clear and moist.   Neck:  no stridor  Eyes: no scleral icterus  Cardiovascular: RRR, 2+ bilat radial pulses  Pulmonary/Chest: nml resp effort, Clear BS bilat  Abdominal: ND, +BS, soft, mild to moderate tenderness diffusely worse in RLQ, no rebound or guarding   Ext: Warm, well perfused, no edema  Neurological: A&O, symmetric facies, moves ext x4  Skin: Skin is warm and dry. No lesion or ulcers on feet   Psychiatric: Behavior is normal. Thought content normal.   Nursing note and vitals reviewed.    Emergency Department Course     ECG (14:41:30):  Rate 73 bpm. CT interval 158. QRS duration 152. QT/QTc 450/495. P-R-T axes 35 -46 135. Normal sinus rhythm. Left axis deviation. Left bundle branch block. Abnormal ECG. Agree with computer interpretation. No significant change compared to EKG dated 5/25/16.   Interpreted at 1444 by Alexis Baker MD.    Imaging:  Radiographic findings were communicated with the patient who voiced understanding of the findings.    CTA Abdomen Pelvis with Contrast  1. Findings compatible with acute appendicitis. No evidence for  abscess formation or intra-abdominal  free air.   2. Normal caliber aorta without dissection.  As read by Radiology.    Laboratory:  UA: glucose 200, blood trace, albumin 50, o/w negative     CBC: WBC 8.6, HGB 14.7,    CMP: Glucose 195 (H), creatinine 1.35 (H), GFR 51 (L), bilirubin 1.8 (H), albumin 3.3 (L), o/w WNL   Lipase: 364  Creatinine POCT: creatinine 1.5 (H), GFR 46 (L)  Bilirubin direct: 0.3 (H)    Interventions:  1023: GI cocktail 30 ml PO  1414: Mefoxin 2 g IV    Emergency Department Course:  947: Nursing notes and vitals reviewed. I performed an exam of the patient as documented above.     Medicine administered as documented above. Blood drawn. This was sent to the lab for further testing, results above. The patient provided a urine sample here in the emergency department. This was sent for laboratory testing, findings above.      The patient was sent for an abdomen pelvis CT while in the emergency department, findings above.     1305: I rechecked the patient and discussed the results of his workup thus far.     1340: I rechecked the patient and son and updated him on the imaging results.     1400: I consulted with Dr. Russell, General Surgery, regarding the patient's history and presentation here in the emergency department.     Findings and plan explained to the Patient who consents to surgery.      Impression & Plan      Medical Decision Makin year old male presenting w/ abdominal pain     DDx includes vascular abnormality such as AAA or dissection, pancreatitis, hepatitis, intra-abdominal mass or abscess, gastritis, dyspepsia.  Doubt atypical ACS given history and reproducible tenderness on exam.  Labs significant for mild hyperbilirubinemia, nml WBC.  Imaging sig for acute appendicitis.  Interventions as noted above with improvement in symptoms.  Abx given as noted above.  Gen surg consulted and accepte DTaP for definitive mgmt in the OR.  Pt counseled on all results, disposition and diagnosis.  I also discussed the pt  and his diagnosis w/ his son who is a transplant nephrologist in [place] per the pt's request.  Pt is understanding and agreeable to plan. Patient admitted in stable condition.      Diagnosis:    ICD-10-CM    1. Acute appendicitis with localized peritonitis, without perforation, abscess, or gangrene  K35.30        Disposition:  Admitted to Dr. Russell's service     I, Shanta Delgadillo, am serving as a scribe at 9:47 AM on 3/21/2020 to document services personally performed by Alexis Baker MD based on my observations and the provider's statements to me.     Shanta Delgadillo  3/21/2020   [facility] EMERGENCY DEPARTMENT       Alexis Baker MD  03/22/20 0035
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
## 2020-03-21 NOTE — PHARMACY-ADMISSION MEDICATION HISTORY
Admission medication history interview status for this patient is complete. See Psychiatric admission navigator for allergy information, prior to admission medications and immunization status.     Medication history interview source(s):Patient  Medication history resources (including written lists, pill bottles, clinic record):Clinic office visit list from 11/18/19  Primary pharmacy:[facility]    Changes made to PTA medication list:  No changes made.  All medications the same as clinic list    Actions taken by pharmacist (provider contacted, etc):None     Additional medication history information:None    Medication reconciliation/reorder completed by provider prior to medication history?  N    For patients on insulin therapy: N      Prior to Admission medications    Medication Sig Last Dose Taking? Auth Provider   aspirin EC 81 MG EC tablet Take 1 tablet (81 mg) by mouth daily 3/20/2020 at am Yes Hector Hall MD   atorvastatin (LIPITOR) 40 MG tablet Take 1 tablet (40 mg) by mouth daily 3/20/2020 at pm Yes Alvaro Lindo MD   Cholecalciferol (VITAMIN D) 2000 UNITS tablet Take 2,000 Units by mouth daily 3/20/2020 at Unknown time Yes Arian Billings Jr., MD   COENZYME Q-10 PO Take 200 mg by mouth daily 3/20/2020 at am Yes Reported, Patient   finasteride (PROSCAR) 5 MG tablet Take 1 tablet (5 mg) by mouth daily 3/20/2020 at pm Yes Alvaro Lindo MD   glipiZIDE (GLUCOTROL XL) 10 MG 24 hr tablet Take 1 tablet (10 mg) by mouth daily 3/20/2020 at am Yes Alvaro Lindo MD   losartan (COZAAR) 100 MG tablet Take 1 tablet (100 mg) by mouth daily 3/20/2020 at Unknown time Yes Alvaro Lindo MD   metFORMIN (GLUCOPHAGE-XR) 500 MG 24 hr tablet TAKE 2 TABLETS TWICE A DAY WITH MEALS 3/20/2020 at pm Yes Alvaro Lindo MD   metoprolol succinate ER (TOPROL-XL) 25 MG 24 hr tablet TAKE ONE AND ONE-HALF TABLETS TWICE A DAY 3/20/2020 at pm Yes Alvaro Lindo MD   multivitamin, therapeutic with  minerals (THERA-VIT-M) TABS Take 1 tablet by mouth daily 3/20/2020 at am Yes Reported, Patient   omega 3 1000 MG CAPS Take 1 g by mouth daily 3/20/2020 at am Yes Arian Billings Jr., MD   Blood Glucose Monitoring Suppl (ACCU-CHEK COMPLETE) KIT 1 kit daily   Arian Billings Jr., MD

## 2020-03-21 NOTE — OP NOTE
PREOPERATIVE DIAGNOSIS: Acute appendicitis.   POSTOPERATIVE DIAGNOSIS: Acute nonperforated appendicitis.   PROCEDURE: Appendectomy.   ANESTHESIA: General.   PREOPERATIVE MEDICATION: Cefotan  IV.   SURGEON: Rosie Russell MD   ASSISTANT: Rimma Keyes PA-C   - the physician assistant was medically necessary in providing adequate exposure in the operating field, maintaining hemostasis, cutting suture, clamping and ligating blood vessels, and visualization of the anatomic structures throughout the surgical procedure.    INDICATIONS: Adal Bates is a 74 year old male who presents with about a 30 hours history of RLQ abdominal pain and generalized.  He has had nausea, anorexia and chills.  He has a physicial exam consistant with appendicitis and CT evidence of acute appendicitis.  He is brought to the operating room at this time for appendectomy.   PROCEDURE: The patient was placed supine, abdomen prepped and draped in usual sterile fashion. Right lower quadrant transverse incision is made and the abdomen is entered through the rectus sheath with dividing about 1 1/2 cm of the rectus muscle fibers. Upon entering the abdomen, there was a small amount of turbid fluid. Edwin wound protector was then placed in position and the appendix was  palpable  through the incision site. The appendix was mobilized up into the incision and is clearly inflamed.  The cecum did not mobilize at all to make for a difficult exposure. The mesoappendix was taken down by double ligation and division. The base of the appendix was ligated with an 0 Vicryl and transected distal to the ligature. The stump was not inverted. The right lower quadrant was then copiously irrigated with Ancef solution and the returns are clear. Incision is then closed using running 0 Vicryl for anterior and posterior fascia and 4-0 subcuticular Monocryl for skin. 0.25% Marcaine was instilled for postoperative pain control. The patient transferred to recovery in  good condition.   ESTIMATED BLOOD LOSS: 30 cc.   INTRAOPERATIVE FINDINGS: Acute nonperforated appendicitis.   PLAN:   Okay to discharge when criteria are met.

## 2020-03-21 NOTE — ED TRIAGE NOTES
Abdominal pain since yesterday morning. Started as epigastric and this morning pain is in the lower abdominal region. Hurts worse with sitting position.  Nauseated, no vomiting.

## 2020-03-21 NOTE — ANESTHESIA POSTPROCEDURE EVALUATION
Patient: Adal Bates    Procedure(s):  APPENDECTOMY, OPEN    Diagnosis:Appendicitis [K37]  Diagnosis Additional Information: No value filed.    Anesthesia Type:  General    Note:  Anesthesia Post Evaluation    Patient location during evaluation: PACU  Patient participation: Able to fully participate in evaluation  Level of consciousness: awake  Pain management: adequate  Airway patency: patent  Cardiovascular status: acceptable  Respiratory status: acceptable  Hydration status: acceptable  PONV: controlled     Anesthetic complications: None          Last vitals:  Vitals:    03/21/20 1705 03/21/20 1715 03/21/20 1730   BP: (!) 152/92 (!) 156/98 (!) 159/95   Pulse: 68     Resp: 20 (!) 6 16   Temp:      SpO2: 96% 90% 96%         Electronically Signed By: Adam Walker MD  March 21, 2020  5:42 PM
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
## 2020-03-21 NOTE — DISCHARGE INSTRUCTIONS
HOME CARE FOLLOWING APPENDECTOMY  FERNANDO Doshi, SAGE Snow R. O Donnell, J. Shaheen    INCISIONAL CARE:  Replace the bandage over your incision(s) until all drainage stops, or if more comfortable to have in place.  If present, leave the steri-strips (white paper tapes) in place for 14 days after surgery.  If Dermabond (a type of skin glue) is present, leave in place until it wears/flakes off (2-3 weeks).     BATHING:  OK to shower 48 hours after surgery.  Avoid baths for 1 week after surgery.  You may wash your hair at any time.  Gently pat your incision dry after bathing.  Do not apply lotions, creams, or ointments to incisions.    ACTIVITY:  Light Activity -- you may immediately be up and about as tolerated.  Walking is encouraged, increase as tolerated.  Driving/Light Work-- when comfortable and off narcotic pain medications.  Strenuous Work/Activity -- limit lifting to 20 pounds for 2 weeks.  Progressively increase with time.  Active Sports (running, biking, etc.) -- cautiously resume after 2 weeks.    DISCOMFORT:  Local anesthetic placed at surgery should provide relief for 4-8 hours.  Begin taking pain pills before discomfort is severe.  Take the pain medication with some food, when possible, to minimize side effects.  Intermittent use of ice packs may help during the first 1-3 weeks after surgery.  Expect gradual improvement.    Over-the-counter anti-inflammatory medications (i.e. Ibuprofen/Advil/Motrin or Naprosyn/Aleve) may be used per package instructions in addition to or while tapering off the narcotic pain medications to decrease swelling and sensitivity.  DO NOT TAKE these Anti-inflammatory medications if your primary physician has advised against doing so, or if you have acid reflux, ulcer, or bleeding disorder, or take blood-thinner medications.  Call your primary physician or the surgery office if you have medication questions.  You may have decreased energy level for 1-2  "weeks after surgery related to your recovery.    DIET:  Start with liquids and gradually resume your regular diet as tolerated.  Consider eating yogurt or taking a probiotic to help your \"gut patricia\" (good bacteria in the bowel/colon) return to normal while taking or after receiving antibiotics.  Drink plenty of fluids.  While taking pain medications, consider use of a stool softener, increase your fiber in your diet, or add a fiber supplement (like Metamucil, Citrucel) to help prevent constipation - a possible side effect of pain medications.    NAUSEA:  If nauseated from the anesthetic/pain meds; rest in bed, get up cautiously with assistance, and drink clear liquids (juice, tea, broth).    FOLLOW-UP AFTER SURGERY:  -Our office will contact you approximately 2-3 weeks after surgery to check on your progress and answer any questions you may have.  If you are doing well, you will not need to return for an office appointment.  If any concerns are identified over the phone, we will help you make an appointment to see a provider.    -If you have not received a phone call, have any questions or concerns, or would like to be seen, please call us at [phone].  We are located at: [address], [address]; [place] [address]    -CONTACT US IF THE FOLLOWING DEVELOPS:   1. A fever that is above 101     2. Increased redness, warmth, drainage, bleeding, or swelling.   3. Pain that is not relieved by rest/ice and your prescription.   4.  Increasing pain after 48 hours.   5. Drainage that is thick, cloudy, yellow, green or white.   6. Any other questions or concerns.      FREQUENTLY ASKED QUESTIONS:    Q:  How should my incision look?    A:  Normally your incision will appear slightly swollen with light redness directly along the incision itself as it heals.  It may feel like a bump or ridge as the healing/scarring happens, and over time (3-4 months) this bump or ridge feeling should slowly go away.  In general, clear " or pink watery drainage can be normal at first as your incision heals, but should decrease over time.    Q:  How do I know if my incision is infected?  A:  Look at your incision for signs of infection, like redness around the incision spreading to surrounding skin, or drainage of cloudy or foul-smelling drainage.  If you feel warm, check your temperature to see if you are running a fever.    **If any of these things occur, please notify the nurse at our office.  We may need you to come into the office for an incision check.      Q:  How do I take care of my incision?  A:  If you have a dressing in place - Starting the day after surgery, replace the dressing 1-2 times a day until there is no further drainage from the incision.  At that time, a dressing is no longer needed.  Try to minimize tape on the skin if irritation is occurring at the tape sites.  If you have significant irritation from tape on the skin, please call the office to discuss other method of dressing your incision.    Small pieces of tape called  steri-strips  may be present directly overlying your incision; these may be removed 10 days after surgery unless otherwise specified by your surgeon.  If these tapes start to loosen at the ends, you may trim them back until they fall off or are removed.    A:  If you had  Dermabond  tissue glue used as a dressing (this causes your incision to look shiny with a clear covering over it) - This type of dressing wears off with time and does not require more dressings over the top unless it is draining around the glue as it wears off.  Do not apply ointments or lotions over the incisions until the glue has completely worn off.    Q:  There is a piece of tape or a sticky  lead  still on my skin.  Can I remove this?  A:  Sometimes the sticky  leads  used for monitoring during surgery or for evaluation in the emergency department are not all removed while you are in the hospital.  These sometimes have a tab or metal  dot on them.  You can easily remove these on your own, like taking off a band-aid.  If there is a gel substance under the  lead , simply wipe/clean it off with a washcloth or paper towel.      Q:  What can I do to minimize constipation (very hard stools, or lack of stools)?  A:  Stay well hydrated.  Increase your dietary fiber intake or take a fiber supplement -with plenty of water.  Walk around frequently.  You may consider an over-the-counter stool-softener.  Your Pharmacist can assist you with choosing one that is stocked at your pharmacy.  Constipation is also one of the most common side effects of pain medication.  If you are using pain medication, be pro-active and try to PREVENT problems with constipation by taking the steps above BEFORE constipation becomes a problem.    Q:  What do I do if I need more pain medications?  A:  Call the office to receive refills.  Be aware that certain pain meds cannot be called into a pharmacy and actually require a paper prescription.  A change may be made in your pain med as you progress thru your recovery period or if you have side effects to certain meds.    --Pain meds are NOT refilled after 5pm on weekdays, and NOT AT ALL on the weekends, so please look ahead to prevent problems.    Q:  Why am I having a hard time sleeping now that I am at home?  A:  Many medications you receive while you are in the hospital can impact your sleep for a number of days after your surgery/hospitalization.  Decreased level of activity and naps during the day may also make sleeping at night difficult.  Try to minimize day-time naps, and get up frequently during the day to walk around your home during your recovery time.  Sleep aides may be of some help, but are not recommended for long-term use.      Q:  I am having some back discomfort.  What should I do?  A:  This may be related to certain positioning that was required for your surgery, extended periods of time in bed, or other changes in  your overall activity level.  You may try ice, heat, acetaminophen, or ibuprofen to treat this temporarily.  Note that many pain medications have acetaminophen in them and would state this on the prescription bottle.  Be sure not to exceed the maximum of 4000mg per day of acetaminophen.     **If the pain you are having does not resolve, is severe, or is a flare of back pain you have had on other occasions prior to surgery, please contact your primary physician for further recommendations or for an appointment to be examined at their office.    Q:  Why am I having headaches?  A:  Headaches can be caused by many things:  caffeine withdrawal, use of pain meds, dehydration, high blood pressure, lack of sleep, over-activity/exhaustion, flare-up of usual migraine headaches.  If you feel this is related to muscle tension (a band-like feeling around the head, or a pressure at the low-back of the head) you may try ice or heat to this area.  You may need to drink more fluids (try electrolyte drink like Gatorade), rest, or take your usual migraine medications.   **If your headaches do not resolve, worsen, are accompanied by other symptoms, or if your blood pressure is high, please call your primary physician for recommendation and/or examination.    Q:  I am unable to urinate.  What do I do?  A:  A small percentage of people can have difficulty urinating initially after surgery.  This includes being able to urinate only a very small amount at a time and feeling discomfort or pressure in the very low abdomen.  This is called  urinary retention , and is actually an urgent situation.  Proceed to your nearest Emergency department for evaluation (not an Urgent Care Center).  Sometimes the bladder does not work correctly after certain medications you receive during surgery, or related to certain procedures.  You may need to have a catheter placed until your bladder recovers.  When planning to go to an Emergency department, it may  help to call the ER to let them know you are coming in for this problem after a surgery.  This may help you get in quicker to be evaluated.  **If you have symptoms of a urinary tract infection, please contact your primary physician for the proper evaluation and treatment.        If you have other questions, please call the office Monday thru Friday between 8am and 5pm to discuss with the Nurse or Physician Assistant.  #[phone]    There is a surgeon ON CALL on weekday evenings and over the weekend in case of urgent need only, and may be contacted at the same number.    If you are having an emergency, call 911 or proceed to your nearest emergency department.              GENERAL ANESTHESIA OR SEDATION ADULT DISCHARGE INSTRUCTIONS   SPECIAL PRECAUTIONS FOR 24 HOURS AFTER SURGERY    IT IS NOT UNUSUAL TO FEEL LIGHT-HEADED OR FAINT, UP TO 24 HOURS AFTER SURGERY OR WHILE TAKING PAIN MEDICATION.  IF YOU HAVE THESE SYMPTOMS; SIT FOR A FEW MINUTES BEFORE STANDING AND HAVE SOMEONE ASSIST YOU WHEN YOU GET UP TO WALK OR USE THE BATHROOM.    YOU SHOULD REST AND RELAX FOR THE NEXT 24 HOURS AND YOU MUST MAKE ARRANGEMENTS TO HAVE SOMEONE STAY WITH YOU FOR AT LEAST 24 HOURS AFTER YOUR DISCHARGE.  AVOID HAZARDOUS AND STRENUOUS ACTIVITIES.  DO NOT MAKE IMPORTANT DECISIONS FOR 24 HOURS.    DO NOT DRIVE ANY VEHICLE OR OPERATE MECHANICAL EQUIPMENT FOR 24 HOURS FOLLOWING THE END OF YOUR SURGERY.  EVEN THOUGH YOU MAY FEEL NORMAL, YOUR REACTIONS MAY BE AFFECTED BY THE MEDICATION YOU HAVE RECEIVED.    DO NOT DRINK ALCOHOLIC BEVERAGES FOR 24 HOURS FOLLOWING YOUR SURGERY.    DRINK CLEAR LIQUIDS (APPLE JUICE, GINGER ALE, 7-UP, BROTH, ETC.).  PROGRESS TO YOUR REGULAR DIET AS YOU FEEL ABLE.    YOU MAY HAVE A DRY MOUTH, A SORE THROAT, MUSCLES ACHES OR TROUBLE SLEEPING.  THESE SHOULD GO AWAY AFTER 24 HOURS.    CALL YOUR DOCTOR FOR ANY OF THE FOLLOWING:  SIGNS OF INFECTION (FEVER, GROWING TENDERNESS AT THE SURGERY SITE, A LARGE AMOUNT OF  DRAINAGE OR BLEEDING, SEVERE PAIN, FOUL-SMELLING DRAINAGE, REDNESS OR SWELLING.    IT HAS BEEN OVER 8 TO 10 HOURS SINCE SURGERY AND YOU ARE STILL NOT ABLE TO URINATE (PASS WATER).

## 2020-03-23 LAB — INTERPRETATION ECG - MUSE: NORMAL

## 2020-03-24 LAB — COPATH REPORT: NORMAL

## 2020-04-01 ENCOUNTER — TELEPHONE (OUTPATIENT)
Dept: SURGERY | Facility: CLINIC | Age: 75
End: 2020-04-01

## 2020-04-01 NOTE — TELEPHONE ENCOUNTER
[facility]  Post op call note     Adal Bates was called for an update regarding his recovery.  He underwent a appendectomy by Dr. Russell on March / 21 / 2020.  Today he tells me he is doing ok.  Still having a lot of pain at the incisional area, worse with movement.  Pt is using 2 extra strength Tylenol every 6 hours which helps some.  He stopped taking the oxycodone as it was causing hallucinations. He is eating a normal diet and his bowels are regular. He states his wounds are healing well.    The pathology revealed acute appendicitis with serositis and adhesions. Negative for malignancy.  This was discussed with the patient.     Discussed continuing with the Tylenol and alternating with 600 mg of Ibuprofen. Advised if his pain worsened or was not improving in the next week to call back. He was instructed to slowly and gradually resume all normal activities. He states all of his questions were answered.  He understands our discussion.  He agrees to follow up as needed or to call our office with any concerns.    Rimma Keyes PA-C      Please route or send letter to:  Primary Care Provider (PCP)    .[facility]

## 2020-04-04 ENCOUNTER — HOSPITAL ENCOUNTER (EMERGENCY)
Facility: CLINIC | Age: 75
Discharge: HOME | End: 2020-04-04
Attending: EMERGENCY MEDICINE | Admitting: EMERGENCY MEDICINE
Payer: MEDICARE

## 2020-04-04 ENCOUNTER — HOSPITAL ENCOUNTER (EMERGENCY)
Facility: CLINIC | Age: 75
End: 2020-04-04
Payer: MEDICARE

## 2020-04-04 VITALS
HEART RATE: 85 BPM | SYSTOLIC BLOOD PRESSURE: 157 MMHG | OXYGEN SATURATION: 99 % | RESPIRATION RATE: 16 BRPM | TEMPERATURE: 97.5 F | DIASTOLIC BLOOD PRESSURE: 99 MMHG

## 2020-04-04 DIAGNOSIS — T81.41XA INFECTION OF SUPERFICIAL INCISIONAL SURGICAL SITE AFTER PROCEDURE, INITIAL ENCOUNTER: ICD-10-CM

## 2020-04-04 LAB
ANION GAP SERPL CALCULATED.3IONS-SCNC: 6 MMOL/L (ref 3–14)
BASOPHILS # BLD AUTO: 0.1 10E9/L (ref 0–0.2)
BASOPHILS NFR BLD AUTO: 0.7 %
BUN SERPL-MCNC: 18 MG/DL (ref 7–30)
CALCIUM SERPL-MCNC: 9.6 MG/DL (ref 8.5–10.1)
CHLORIDE SERPL-SCNC: 101 MMOL/L (ref 94–109)
CO2 SERPL-SCNC: 26 MMOL/L (ref 20–32)
CREAT SERPL-MCNC: 1.18 MG/DL (ref 0.66–1.25)
DIFFERENTIAL METHOD BLD: ABNORMAL
EOSINOPHIL # BLD AUTO: 0.2 10E9/L (ref 0–0.7)
EOSINOPHIL NFR BLD AUTO: 2.1 %
ERYTHROCYTE [DISTWIDTH] IN BLOOD BY AUTOMATED COUNT: 14.6 % (ref 10–15)
GFR SERPL CREATININE-BSD FRML MDRD: 60 ML/MIN/{1.73_M2}
GLUCOSE SERPL-MCNC: 116 MG/DL (ref 70–99)
HCT VFR BLD AUTO: 49 % (ref 40–53)
HGB BLD-MCNC: 15.1 G/DL (ref 13.3–17.7)
IMM GRANULOCYTES # BLD: 0 10E9/L (ref 0–0.4)
IMM GRANULOCYTES NFR BLD: 0.3 %
LYMPHOCYTES # BLD AUTO: 2 10E9/L (ref 0.8–5.3)
LYMPHOCYTES NFR BLD AUTO: 20.2 %
MCH RBC QN AUTO: 27.9 PG (ref 26.5–33)
MCHC RBC AUTO-ENTMCNC: 30.8 G/DL (ref 31.5–36.5)
MCV RBC AUTO: 91 FL (ref 78–100)
MONOCYTES # BLD AUTO: 0.8 10E9/L (ref 0–1.3)
MONOCYTES NFR BLD AUTO: 8.3 %
NEUTROPHILS # BLD AUTO: 6.8 10E9/L (ref 1.6–8.3)
NEUTROPHILS NFR BLD AUTO: 68.4 %
NRBC # BLD AUTO: 0 10*3/UL
NRBC BLD AUTO-RTO: 0 /100
PLATELET # BLD AUTO: 331 10E9/L (ref 150–450)
POTASSIUM SERPL-SCNC: 4.2 MMOL/L (ref 3.4–5.3)
RBC # BLD AUTO: 5.41 10E12/L (ref 4.4–5.9)
SODIUM SERPL-SCNC: 133 MMOL/L (ref 133–144)
WBC # BLD AUTO: 9.9 10E9/L (ref 4–11)

## 2020-04-04 PROCEDURE — 99284 EMERGENCY DEPT VISIT MOD MDM: CPT | Mod: 25

## 2020-04-04 PROCEDURE — 87077 CULTURE AEROBIC IDENTIFY: CPT | Performed by: EMERGENCY MEDICINE

## 2020-04-04 PROCEDURE — 80048 BASIC METABOLIC PNL TOTAL CA: CPT | Performed by: EMERGENCY MEDICINE

## 2020-04-04 PROCEDURE — 25000128 H RX IP 250 OP 636: Performed by: EMERGENCY MEDICINE

## 2020-04-04 PROCEDURE — 96365 THER/PROPH/DIAG IV INF INIT: CPT | Mod: 59

## 2020-04-04 PROCEDURE — 10060 I&D ABSCESS SIMPLE/SINGLE: CPT

## 2020-04-04 PROCEDURE — 87070 CULTURE OTHR SPECIMN AEROBIC: CPT | Performed by: EMERGENCY MEDICINE

## 2020-04-04 PROCEDURE — 87186 SC STD MICRODIL/AGAR DIL: CPT | Performed by: EMERGENCY MEDICINE

## 2020-04-04 PROCEDURE — 85025 COMPLETE CBC W/AUTO DIFF WBC: CPT | Performed by: EMERGENCY MEDICINE

## 2020-04-04 RX ORDER — AMPICILLIN AND SULBACTAM 2; 1 G/1; G/1
3 INJECTION, POWDER, FOR SOLUTION INTRAMUSCULAR; INTRAVENOUS ONCE
Status: COMPLETED | OUTPATIENT
Start: 2020-04-04 | End: 2020-04-04

## 2020-04-04 RX ORDER — LIDOCAINE HYDROCHLORIDE AND EPINEPHRINE 10; 10 MG/ML; UG/ML
INJECTION, SOLUTION INFILTRATION; PERINEURAL
Status: DISCONTINUED
Start: 2020-04-04 | End: 2020-04-04 | Stop reason: HOSPADM

## 2020-04-04 RX ADMIN — AMPICILLIN AND SULBACTAM 3 G: 2; 1 INJECTION, POWDER, FOR SOLUTION INTRAMUSCULAR; INTRAVENOUS at 18:49

## 2020-04-04 ASSESSMENT — ENCOUNTER SYMPTOMS
VOMITING: 0
SHORTNESS OF BREATH: 0
DYSURIA: 0
DIFFICULTY URINATING: 0
WOUND: 1
FEVER: 0
DIARRHEA: 0
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
## 2020-04-04 NOTE — ED TRIAGE NOTES
Presents to ED with complaints for post-op problem. Patient had an open appendectomy two weeks ago. Today, while patient showered, he noticed a large amount of pussy drainage coming from incision site. Pt denies nausea, chills, or fever. ABCs intact. A&OX4.

## 2020-04-04 NOTE — ED AVS SNAPSHOT
[facility] Emergency Department  [address]  [facility] [address]  Phone:  [phone]  Fax:  [phone]                                    Adal Bates   MRN: [id number]    Department:  [facility] Emergency Department   Date of Visit:  4/4/2020           After Visit Summary Signature Page    I have received my discharge instructions, and my questions have been answered. I have discussed any challenges I see with this plan with the nurse or doctor.    ..........................................................................................................................................  Patient/Patient Representative Signature      ..........................................................................................................................................  Patient Representative Print Name and Relationship to Patient    ..................................................               ................................................  Date                                   Time    ..........................................................................................................................................  Reviewed by Signature/Title    ...................................................              ..............................................  Date                                               Time          22EPIC Rev 08/18

## 2020-04-05 NOTE — DISCHARGE INSTRUCTIONS
Dressing changes 1x per day:  Remove outer dressing  Remove inner dressing (may get in shower to get wet if difficult to remove)  Place moistened 4x4 in wound using tweezers.  Moisten with saline.  Cover with dry gauze.    Call clinic or return to Ed for increasing pain, redness or fevers.

## 2020-04-05 NOTE — CONSULTS
Surgery Consult    Pt had called clinic earlier this evening with complaints of thick bloody and white drainage from his wound. Had been getting more painful and swollen over the past few days. I instructed him to present to the emergency room to assess likely wound infection.     Here he states it was swollen under the incision ever since surgery and got progressively more painful over the last few days. Had called clinic a few days ago with ongoing pain but it was not red at that time. Today there is skin redness and it started drainage from a pinpoint opening in the center but the skin was otherwise intact. He denied fever, chills. Was eating and drinking well and having normal bowel movements without any other generalized abdominal pain.     BP (!) 146/95   Pulse 80   Temp 97.5  F (36.4  C) (Oral)   Resp 18   SpO2 98%   VSS and AF  Appears comfortable  RLQ mcburney incision with firm swelling and redness extending 2cm around the entire wound. Small thick purulent drainage from pinpoint opening in center.    Labs reviewed. Wbc 9.9  Otherwise nl    Procedure note;  I&D completed. Betadine prep used and surrounding skin draped. Anesthetized with 1% lidocaine and opened skin with 11 blade along length of incision. Copious thick foul smelling fluid was expressed, 100-150mL. Wound probed with clamp. Some fluid collected for culture. The wound was rinsed with saline and clots were removed from the wound base. Fascia intact. Moist 4x4 gauze tucked into wound and covered with ABD and tape.      Discharge home with daily dressing changes (written instructions provided and explained to patient and also wife over the phone)  Augmentin x 7 days  Call clinic next week to set up wound check appointment      Discussed with Dr. Pietro Everett MD

## 2020-04-07 LAB
BACTERIA SPEC CULT: ABNORMAL
Lab: ABNORMAL
SPECIMEN SOURCE: ABNORMAL

## 2020-04-08 ENCOUNTER — OFFICE VISIT (OUTPATIENT)
Dept: SURGERY | Facility: CLINIC | Age: 75
End: 2020-04-08
Payer: MEDICARE

## 2020-04-08 VITALS
HEART RATE: 84 BPM | DIASTOLIC BLOOD PRESSURE: 88 MMHG | WEIGHT: 160 LBS | HEIGHT: 68 IN | OXYGEN SATURATION: 100 % | BODY MASS INDEX: 24.25 KG/M2 | SYSTOLIC BLOOD PRESSURE: 132 MMHG | RESPIRATION RATE: 16 BRPM

## 2020-04-08 DIAGNOSIS — Z09 SURGICAL FOLLOWUP VISIT: Primary | ICD-10-CM

## 2020-04-08 PROCEDURE — 99024 POSTOP FOLLOW-UP VISIT: CPT | Performed by: PHYSICIAN ASSISTANT

## 2020-04-08 ASSESSMENT — MIFFLIN-ST. JEOR: SCORE: 1435.26

## 2020-04-08 NOTE — PROGRESS NOTES
[facility] Note     Subjective:  Adal Bates is here for postoperative visit and wound check. He underwent a appendectomy by Dr. Russell on March / 31 / 2020.  He was called for post op check on April 1, 2020 at that time he was doing well.  His wife did call back that day to report some swelling around the incision site that had been present for the last several days.  She emailed pictures which showed some mild swelling, no redness, no drainage.  They were advised to monitor wound.  On April 4, 2020 he called into clinic reporting drainage from wound.  He presented to ER where Dr Everett opened his wound with expression of purulent fluid.  Cultures were obtained.  He and his wife were instructed on packing wound.  He was prescribed Augmentin BID for 7 days.  Today he tells me he is doing well.  His only complaint is loose stools from antibiotic.  His wife has been doing daily packing changes with no difficulties. He has been covering wound with saran wrap when showering. He has been using Tylenol alternating with Ibuprofen for discomfort  He has no other concerns today.    Objective:  Abd - Abdomen soft, non-tender.  Inc - open incision with packing in place.  Packing was removed.  Wound was cleansed with Sea-clens.  Wound was gently packed with moistened 4x4 guaze, 4x4 gauze placed over wound and secured in place with tape.       Assessment:  S/p open appendectomy  Wound infection.     Plan:  Pt was advised to continue with dressing changes.  Instructions were written for wife.  He is to finish out the antibiotic.  Cultures growing Strep constellatus pan sensitive. He can follow up in 1-2 weeks, or call if he continues to do well.     He was instructed to call if fever, increasing pain, redness or drainage at the incision sites occurs.  Otherwise he will follow up with Alvaro Lindo for his regular medical needs.    Rimma Keyes PA-C    Please route or send letter to:  *None*

## 2020-04-24 ENCOUNTER — TELEPHONE (OUTPATIENT)
Dept: SURGERY | Facility: CLINIC | Age: 75
End: 2020-04-24

## 2020-04-24 NOTE — TELEPHONE ENCOUNTER
Procedure: appendectomy  Date: 03/21/2020  Surgeon: Yvonne    Patient developed an infection after surgery and ultimately area was opened up by Dr. Everett to express the fluid.    He saw Rimma on April 8th for a post op wound check.  His wife has been providing the wound care/dressing changes for the patient daily.      It was recommended to follow up in two weeks or to call to discuss progress.    Patient's wife is reporting that the wound is healing well.  She is still changing the dressing daily, but is no longer needing to pack it.  No draining. No odor.  Some bruising around wound edges.  No fever and no longer on antibiotics.    Encouraged her to continue doing the daily dressing changes until healed and to call with any questions or concerns or if s/s of infection develop.    She agreed and reports that their son is a physician and she has been sending him pictures of the wound.    Informed her that patient's provider(s) will be provided with this update and if they want to see patient in clinic at some point, our office will call to schedule.    She verbalized understanding and will call PRN.    Jeanette Rdz RN-BSN

## 2020-04-24 NOTE — TELEPHONE ENCOUNTER
Name of caller: Spouse     Reason for Call:  Patient was calling to make a follow up appointment due to infection. Says its getting better and may not need to come in.     Surgeon:  Dr. Everett    Recent Surgery:  Yes.    If yes, when & what type:  1st po appy 3/21/20 NL[facility]/I&D 4/4/20      Best phone number to reach pt at is:[phone] (H)  Ok to leave a message with medical info? Yes.    Pharmacy preferred (if calling for a refill): N/A

## 2020-05-04 NOTE — OR NURSING
Went over discharge instructions over phone with wife. WIll send copy of instructions home.   Sski Pregnancy And Lactation Text: This medication is Pregnancy Category D and isn't considered safe during pregnancy. It is excreted in breast milk.

## 2020-08-13 DIAGNOSIS — E11.8 TYPE 2 DIABETES MELLITUS WITH COMPLICATION, WITHOUT LONG-TERM CURRENT USE OF INSULIN (H): ICD-10-CM

## 2020-08-13 RX ORDER — GLIPIZIDE 10 MG/1
TABLET, FILM COATED, EXTENDED RELEASE ORAL
Qty: 90 TABLET | Refills: 0 | Status: SHIPPED | OUTPATIENT
Start: 2020-08-13 | End: 2020-10-12

## 2020-08-13 NOTE — TELEPHONE ENCOUNTER
"Requested Prescriptions   Pending Prescriptions Disp Refills     glipiZIDE (GLUCOTROL XL) 10 MG 24 hr tablet [Pharmacy Med Name: glipiZIDE ER 10 MG Oral Tablet Extended Release 24 Hour] 90 tablet 0     Sig: Take 1 tablet by mouth once daily       Sulfonylurea Agents Failed - 8/13/2020  2:28 PM        Failed - Patient has documented A1c within the specified period of time.     If HgbA1C is 8 or greater, it needs to be on file within the past 3 months.  If less than 8, must be on file within the past 6 months.     Recent Labs   Lab Test 11/18/19  1107   A1C 7.5*             Failed - Recent (6 mo) or future (30 days) visit within the authorizing provider's specialty     Patient had office visit in the last 6 months or has a visit in the next 30 days with authorizing provider or within the authorizing provider's specialty.  See \"Patient Info\" tab in inbasket, or \"Choose Columns\" in Meds & Orders section of the refill encounter.            Passed - Medication is active on med list        Passed - Patient is age 18 or older        Passed - Patient has a recent creatinine (normal) within the past 12 mos.     Recent Labs   Lab Test 04/04/20  1819 03/21/20  1027   CR 1.18  --    CREAT  --  1.5*       Ok to refill medication if creatinine is low             Last office visit 11/18/19.  Medication is being filled for 1 time refill only due to:  Patient needs to be seen because diabetic follow up is due.  Spoke with patient's wife and transfered to scheduling to help set up visit..    "

## 2020-10-12 ENCOUNTER — OFFICE VISIT (OUTPATIENT)
Dept: INTERNAL MEDICINE | Facility: CLINIC | Age: 75
End: 2020-10-12
Payer: MEDICARE

## 2020-10-12 VITALS
OXYGEN SATURATION: 98 % | HEIGHT: 68 IN | WEIGHT: 159 LBS | HEART RATE: 78 BPM | TEMPERATURE: 97.8 F | SYSTOLIC BLOOD PRESSURE: 128 MMHG | DIASTOLIC BLOOD PRESSURE: 82 MMHG | BODY MASS INDEX: 24.1 KG/M2

## 2020-10-12 DIAGNOSIS — Z00.00 ENCOUNTER FOR PREVENTATIVE ADULT HEALTH CARE EXAMINATION: Primary | ICD-10-CM

## 2020-10-12 DIAGNOSIS — I25.5 ISCHEMIC CARDIOMYOPATHY: ICD-10-CM

## 2020-10-12 DIAGNOSIS — Z95.1 S/P CABG (CORONARY ARTERY BYPASS GRAFT): ICD-10-CM

## 2020-10-12 DIAGNOSIS — E53.8 VITAMIN B 12 DEFICIENCY: ICD-10-CM

## 2020-10-12 DIAGNOSIS — I10 HYPERTENSION GOAL BP (BLOOD PRESSURE) < 140/90: ICD-10-CM

## 2020-10-12 DIAGNOSIS — I10 BENIGN ESSENTIAL HYPERTENSION: ICD-10-CM

## 2020-10-12 DIAGNOSIS — Z23 NEED FOR PROPHYLACTIC VACCINATION AND INOCULATION AGAINST INFLUENZA: ICD-10-CM

## 2020-10-12 DIAGNOSIS — E11.9 TYPE 2 DIABETES, HBA1C GOAL < 8% (H): ICD-10-CM

## 2020-10-12 DIAGNOSIS — Z12.5 SCREENING FOR PROSTATE CANCER: ICD-10-CM

## 2020-10-12 DIAGNOSIS — R35.0 BENIGN PROSTATIC HYPERPLASIA WITH URINARY FREQUENCY: ICD-10-CM

## 2020-10-12 DIAGNOSIS — E11.8 TYPE 2 DIABETES MELLITUS WITH COMPLICATION, WITHOUT LONG-TERM CURRENT USE OF INSULIN (H): ICD-10-CM

## 2020-10-12 DIAGNOSIS — N40.1 BENIGN PROSTATIC HYPERPLASIA WITH URINARY FREQUENCY: ICD-10-CM

## 2020-10-12 LAB
ERYTHROCYTE [DISTWIDTH] IN BLOOD BY AUTOMATED COUNT: 15.1 % (ref 10–15)
HBA1C MFR BLD: 7.1 % (ref 0–5.6)
HCT VFR BLD AUTO: 46.1 % (ref 40–53)
HGB BLD-MCNC: 14.6 G/DL (ref 13.3–17.7)
MCH RBC QN AUTO: 27.5 PG (ref 26.5–33)
MCHC RBC AUTO-ENTMCNC: 31.7 G/DL (ref 31.5–36.5)
MCV RBC AUTO: 87 FL (ref 78–100)
PLATELET # BLD AUTO: 197 10E9/L (ref 150–450)
RBC # BLD AUTO: 5.31 10E12/L (ref 4.4–5.9)
VIT B12 SERPL-MCNC: 150 PG/ML (ref 193–986)
WBC # BLD AUTO: 7.1 10E9/L (ref 4–11)

## 2020-10-12 PROCEDURE — 82607 VITAMIN B-12: CPT | Performed by: INTERNAL MEDICINE

## 2020-10-12 PROCEDURE — 90662 IIV NO PRSV INCREASED AG IM: CPT | Performed by: INTERNAL MEDICINE

## 2020-10-12 PROCEDURE — 80061 LIPID PANEL: CPT | Performed by: INTERNAL MEDICINE

## 2020-10-12 PROCEDURE — 85027 COMPLETE CBC AUTOMATED: CPT | Performed by: INTERNAL MEDICINE

## 2020-10-12 PROCEDURE — 99214 OFFICE O/P EST MOD 30 MIN: CPT | Mod: 25 | Performed by: INTERNAL MEDICINE

## 2020-10-12 PROCEDURE — 80053 COMPREHEN METABOLIC PANEL: CPT | Performed by: INTERNAL MEDICINE

## 2020-10-12 PROCEDURE — G0103 PSA SCREENING: HCPCS | Performed by: INTERNAL MEDICINE

## 2020-10-12 PROCEDURE — 83036 HEMOGLOBIN GLYCOSYLATED A1C: CPT | Performed by: INTERNAL MEDICINE

## 2020-10-12 PROCEDURE — 82043 UR ALBUMIN QUANTITATIVE: CPT | Performed by: INTERNAL MEDICINE

## 2020-10-12 PROCEDURE — 84443 ASSAY THYROID STIM HORMONE: CPT | Performed by: INTERNAL MEDICINE

## 2020-10-12 PROCEDURE — 36415 COLL VENOUS BLD VENIPUNCTURE: CPT | Performed by: INTERNAL MEDICINE

## 2020-10-12 PROCEDURE — G0008 ADMIN INFLUENZA VIRUS VAC: HCPCS | Performed by: INTERNAL MEDICINE

## 2020-10-12 PROCEDURE — 99397 PER PM REEVAL EST PAT 65+ YR: CPT | Performed by: INTERNAL MEDICINE

## 2020-10-12 RX ORDER — GLIPIZIDE 10 MG/1
10 TABLET, FILM COATED, EXTENDED RELEASE ORAL DAILY
Qty: 90 TABLET | Refills: 1 | Status: SHIPPED | OUTPATIENT
Start: 2020-10-12 | End: 2021-03-10

## 2020-10-12 RX ORDER — FINASTERIDE 5 MG/1
5 TABLET, FILM COATED ORAL DAILY
Qty: 90 TABLET | Refills: 3 | Status: SHIPPED | OUTPATIENT
Start: 2020-10-12 | End: 2021-07-30

## 2020-10-12 RX ORDER — METFORMIN HCL 500 MG
TABLET, EXTENDED RELEASE 24 HR ORAL
Qty: 360 TABLET | Refills: 3 | Status: SHIPPED | OUTPATIENT
Start: 2020-10-12 | End: 2021-06-08

## 2020-10-12 RX ORDER — LOSARTAN POTASSIUM 100 MG/1
100 TABLET ORAL DAILY
Qty: 90 TABLET | Refills: 3 | Status: SHIPPED | OUTPATIENT
Start: 2020-10-12 | End: 2021-07-30

## 2020-10-12 RX ORDER — METOPROLOL SUCCINATE 25 MG/1
TABLET, EXTENDED RELEASE ORAL
Qty: 270 TABLET | Refills: 3 | Status: SHIPPED | OUTPATIENT
Start: 2020-10-12 | End: 2021-10-13

## 2020-10-12 RX ORDER — ATORVASTATIN CALCIUM 40 MG/1
40 TABLET, FILM COATED ORAL DAILY
Qty: 90 TABLET | Refills: 3 | Status: SHIPPED | OUTPATIENT
Start: 2020-10-12 | End: 2021-10-22

## 2020-10-12 ASSESSMENT — MIFFLIN-ST. JEOR: SCORE: 1430.72

## 2020-10-12 NOTE — PROGRESS NOTES
SUBJECTIVE:   CC: Adal Bates is an 74 year old male who presents for preventative health visit.       Patient has been advised of split billing requirements and indicates understanding: Yes  HPI    PROBLEMS TO ADD ON...  Has H/O DM. On diet , exercise and PO treatment. Blood sugars are controlled. No parestesias. No hypoglycemias.  Has h/o HTN. on medical treatment. BP has been controlled. No side effects from medications. No CP, HA, dizziness. good compliance with medications and low salt diet.  Has h/o ischemic heart disease, asymptomatic regarding chest pains, SOB,palpitations. Has good compliance with treatment, diet and exercise.    Has had appendectomy 6 months ago. Feels better now. Normal BMs.     Today's PHQ-2 Score:   PHQ-2 ( 1999 [employer]) 10/12/2020   Q1: Little interest or pleasure in doing things 0   Q2: Feeling down, depressed or hopeless 0   PHQ-2 Score 0   Q1: Little interest or pleasure in doing things -   Q2: Feeling down, depressed or hopeless -   PHQ-2 Score -       Abuse: Current or Past(Physical, Sexual or Emotional)- No  Do you feel safe in your environment? Yes        Social History     Tobacco Use     Smoking status: Never Smoker     Smokeless tobacco: Never Used   Substance Use Topics     Alcohol use: Yes     Alcohol/week: 0.0 standard drinks     Comment: social     If you drink alcohol do you typically have >3 drinks per day or >7 drinks per week? No    Alcohol Use 11/18/2019   Prescreen: >3 drinks/day or >7 drinks/week? No   Prescreen: >3 drinks/day or >7 drinks/week? -   No flowsheet data found.    Last PSA:   PSA   Date Value Ref Range Status   11/18/2019 3.31 0 - 4 ug/L Final     Comment:     Assay Method:  Chemiluminescence using Siemens Vista analyzer       Reviewed orders with patient. Reviewed health maintenance and updated orders accordingly - Yes  Lab work is in process  Labs reviewed in EPIC    Reviewed and updated as needed this visit by clinical staff  Tobacco  Allergies   "Meds  Problems  Med Hx  Surg Hx  Fam Hx  Soc Hx          Reviewed and updated as needed this visit by Provider  Tobacco  Allergies  Meds  Problems  Med Hx  Surg Hx  Fam Hx             Review of Systems  CONSTITUTIONAL: NEGATIVE for fever, chills, change in weight  INTEGUMENTARY/SKIN: NEGATIVE for worrisome rashes, moles or lesions  EYES: NEGATIVE for vision changes or irritation  ENT: NEGATIVE for ear, mouth and throat problems  RESP: NEGATIVE for significant cough or SOB  CV: NEGATIVE for chest pain, palpitations or peripheral edema  GI: NEGATIVE for nausea, abdominal pain, heartburn, or change in bowel habits   male: negative for dysuria, hematuria, decreased urinary stream, erectile dysfunction, urethral discharge  MUSCULOSKELETAL: NEGATIVE for significant arthralgias or myalgia  NEURO: NEGATIVE for weakness, dizziness or paresthesias  PSYCHIATRIC: NEGATIVE for changes in mood or affect    OBJECTIVE:   BP (!) 134/93 (BP Location: Left arm, Patient Position: Sitting, Cuff Size: Adult Regular)   Pulse 78   Temp 97.8  F (36.6  C) (Oral)   Ht 1.727 m (5' 8\")   Wt 72.1 kg (159 lb)   SpO2 98%   BMI 24.18 kg/m      Physical Exam  GENERAL: healthy, alert and no distress  EYES: Eyes grossly normal to inspection, PERRL and conjunctivae and sclerae normal  HENT: ear canals and TM's normal, nose and mouth without ulcers or lesions  NECK: no adenopathy, no asymmetry, masses, or scars and thyroid normal to palpation  RESP: lungs clear to auscultation - no rales, rhonchi or wheezes  CV: regular rate and rhythm, normal S1 S2, no S3 or S4, no murmur, click or rub, no peripheral edema and peripheral pulses strong  ABDOMEN: soft, nontender, no hepatosplenomegaly, no masses and bowel sounds normal  RECTAL: normal sphincter tone, no rectal masses, prostate normal size, smooth, nontender without nodules or masses  MS: no gross musculoskeletal defects noted, no edema  SKIN: no suspicious lesions or rashes  NEURO: " Normal strength and tone, mentation intact and speech normal  PSYCH: mentation appears normal, affect normal/bright    Diagnostic Test Results:  Labs reviewed in Epic    ASSESSMENT/PLAN:       ICD-10-CM    1. Encounter for preventative adult health care examination  Z00.00 CBC with platelets     Comprehensive metabolic panel     Lipid panel reflex to direct LDL Fasting     TSH with free T4 reflex     Prostate spec antigen screen     Hemoglobin A1c     Albumin Random Urine Quantitative with Creat Ratio     Vitamin B12   2. Type 2 diabetes, HbA1C goal < 8% (H)  E11.9 Hemoglobin A1c     Albumin Random Urine Quantitative with Creat Ratio     OFFICE/OUTPT VISIT,EST,LEVL III   3. Hypertension goal BP (blood pressure) < 140/90  I10 CBC with platelets     Comprehensive metabolic panel     Lipid panel reflex to direct LDL Fasting     TSH with free T4 reflex     OFFICE/OUTPT VISIT,EST,LEVL III   4. Vitamin B 12 deficiency  E53.8 Vitamin B12     OFFICE/OUTPT VISIT,EST,LEVL III   5. Screening for prostate cancer  Z12.5 Prostate spec antigen screen   6. Ischemic cardiomyopathy  I25.5 atorvastatin (LIPITOR) 40 MG tablet     losartan (COZAAR) 100 MG tablet     OFFICE/OUTPT VISIT,EST,LEVL III   7. Benign prostatic hyperplasia with urinary frequency  N40.1 finasteride (PROSCAR) 5 MG tablet    R35.0    8. Type 2 diabetes mellitus with complication, without long-term current use of insulin (H)  E11.8 glipiZIDE (GLUCOTROL XL) 10 MG 24 hr tablet     losartan (COZAAR) 100 MG tablet     metFORMIN (GLUCOPHAGE-XR) 500 MG 24 hr tablet   9. Benign essential hypertension  I10 losartan (COZAAR) 100 MG tablet   10. S/P CABG (coronary artery bypass graft)  Z95.1 metoprolol succinate ER (TOPROL-XL) 25 MG 24 hr tablet     Assess lab work   Cont treatment  Monitor BP, glucose     Immunized     Patient has been advised of split billing requirements and indicates understanding: Yes  COUNSELING:   Reviewed preventive health counseling, as reflected in  "patient instructions       Regular exercise       Healthy diet/nutrition       Vision screening       Hearing screening       Colon cancer screening       Prostate cancer screening    Estimated body mass index is 24.18 kg/m  as calculated from the following:    Height as of this encounter: 1.727 m (5' 8\").    Weight as of this encounter: 72.1 kg (159 lb).         He reports that he has never smoked. He has never used smokeless tobacco.      Counseling Resources:  ATP IV Guidelines  Pooled Cohorts Equation Calculator  FRAX Risk Assessment  ICSI Preventive Guidelines  Dietary Guidelines for Americans, 2010  USDA's MyPlate  ASA Prophylaxis  Lung CA Screening    Alvaro Lindo MD  [facility]  "

## 2020-10-12 NOTE — PROGRESS NOTES
Subjective     Adal Bates is a 74 year old male who presents to clinic today for the following health issues:    HPI         Diabetes Follow-up      How often are you checking your blood sugar? Once a week    What concerns do you have today about your diabetes? None     Do you have any of these symptoms? (Select all that apply)  No numbness or tingling in feet.  No redness, sores or blisters on feet.  No complaints of excessive thirst.  No reports of blurry vision.  No significant changes to weight.  Have you had a diabetic eye exam in the last 12 months? No, last was 07/01/2019            Hyperlipidemia Follow-Up      Are you regularly taking any medication or supplement to lower your cholesterol?   Yes- Atorvastatin    Are you having muscle aches or other side effects that you think could be caused by your cholesterol lowering medication?  No    Hypertension Follow-up      Do you check your blood pressure regularly outside of the clinic? Yes     Are you following a low salt diet? Yes    Are your blood pressures ever more than 140 on the top number (systolic) OR more   than 90 on the bottom number (diastolic), for example 140/90? No    BP Readings from Last 2 Encounters:   10/12/20 128/82   04/08/20 132/88     Hemoglobin A1C (%)   Date Value   11/18/2019 7.5 (H)   10/10/2018 7.9 (H)     LDL Cholesterol Calculated (mg/dL)   Date Value   11/18/2019 55   10/10/2018 57         How many servings of fruits and vegetables do you eat daily?  4 or more    On average, how many sweetened beverages do you drink each day (Examples: soda, juice, sweet tea, etc.  Do NOT count diet or artificially sweetened beverages)?   0    How many days per week do you exercise enough to make your heart beat faster? 7    How many minutes a day do you exercise enough to make your heart beat faster? 20 - 29    How many days per week do you miss taking your medication? 0        Review of Systems         Objective    /82   Pulse 78   Temp  "97.8  F (36.6  C) (Oral)   Ht 1.727 m (5' 8\")   Wt 72.1 kg (159 lb)   SpO2 98%   BMI 24.18 kg/m    Body mass index is 24.18 kg/m .  Physical Exam       "

## 2020-10-12 NOTE — LETTER
[facility]  [address], [address]  [place] [address]  [phone]        October 14, 2020    Adal Bates  [address] [facility] [address]            Dear Mr. Adal Bates:    Low vit B12, recommend to start injections for correction.  Please schedule a nurse only appointment for these monthly injections.  Borderline decreased kidney function, follow up lab in 6 months.   Rest of the results are normal.   If you have any further questions or problems, please contact our office.    Sincerely,        Alvaro Lindo M.D.    Results for orders placed or performed in visit on 10/12/20   CBC with platelets     Status: Abnormal   Result Value Ref Range    WBC 7.1 4.0 - 11.0 10e9/L    RBC Count 5.31 4.4 - 5.9 10e12/L    Hemoglobin 14.6 13.3 - 17.7 g/dL    Hematocrit 46.1 40.0 - 53.0 %    MCV 87 78 - 100 fl    MCH 27.5 26.5 - 33.0 pg    MCHC 31.7 31.5 - 36.5 g/dL    RDW 15.1 (H) 10.0 - 15.0 %    Platelet Count 197 150 - 450 10e9/L   Comprehensive metabolic panel     Status: Abnormal   Result Value Ref Range    Sodium 139 133 - 144 mmol/L    Potassium 4.9 3.4 - 5.3 mmol/L    Chloride 107 94 - 109 mmol/L    Carbon Dioxide 23 20 - 32 mmol/L    Anion Gap 9 3 - 14 mmol/L    Glucose 126 (H) 70 - 99 mg/dL    Urea Nitrogen 20 7 - 30 mg/dL    Creatinine 1.42 (H) 0.66 - 1.25 mg/dL    GFR Estimate 48 (L) >60 mL/min/[1.73_m2]    GFR Estimate If Black 55 (L) >60 mL/min/[1.73_m2]    Calcium 9.8 8.5 - 10.1 mg/dL    Bilirubin Total 1.6 (H) 0.2 - 1.3 mg/dL    Albumin 3.7 3.4 - 5.0 g/dL    Protein Total 7.9 6.8 - 8.8 g/dL    Alkaline Phosphatase 84 40 - 150 U/L    ALT 25 0 - 70 U/L    AST 18 0 - 45 U/L   Lipid panel reflex to direct LDL Fasting     Status: Abnormal   Result Value Ref Range    Cholesterol 116 <200 mg/dL    Triglycerides 80 <150 mg/dL    HDL Cholesterol 38 (L) >39 mg/dL    LDL Cholesterol Calculated 62 <100 mg/dL    Non HDL Cholesterol 78 <130 mg/dL   TSH with free T4 reflex     Status: None    Result Value Ref Range    TSH 3.09 0.40 - 4.00 mU/L   Prostate spec antigen screen     Status: None   Result Value Ref Range    PSA 1.21 0 - 4 ug/L   Hemoglobin A1c     Status: Abnormal   Result Value Ref Range    Hemoglobin A1C 7.1 (H) 0 - 5.6 %   Albumin Random Urine Quantitative with Creat Ratio     Status: Abnormal   Result Value Ref Range    Creatinine Urine 78 mg/dL    Albumin Urine mg/L 129 mg/L    Albumin Urine mg/g Cr 164.96 (H) 0 - 17 mg/g Cr   Vitamin B12     Status: Abnormal   Result Value Ref Range    Vitamin B12 150 (L) 193 - 986 pg/mL

## 2020-10-13 LAB
ALBUMIN SERPL-MCNC: 3.7 G/DL (ref 3.4–5)
ALP SERPL-CCNC: 84 U/L (ref 40–150)
ALT SERPL W P-5'-P-CCNC: 25 U/L (ref 0–70)
ANION GAP SERPL CALCULATED.3IONS-SCNC: 9 MMOL/L (ref 3–14)
AST SERPL W P-5'-P-CCNC: 18 U/L (ref 0–45)
BILIRUB SERPL-MCNC: 1.6 MG/DL (ref 0.2–1.3)
BUN SERPL-MCNC: 20 MG/DL (ref 7–30)
CALCIUM SERPL-MCNC: 9.8 MG/DL (ref 8.5–10.1)
CHLORIDE SERPL-SCNC: 107 MMOL/L (ref 94–109)
CHOLEST SERPL-MCNC: 116 MG/DL
CO2 SERPL-SCNC: 23 MMOL/L (ref 20–32)
CREAT SERPL-MCNC: 1.42 MG/DL (ref 0.66–1.25)
CREAT UR-MCNC: 78 MG/DL
GFR SERPL CREATININE-BSD FRML MDRD: 48 ML/MIN/{1.73_M2}
GLUCOSE SERPL-MCNC: 126 MG/DL (ref 70–99)
HDLC SERPL-MCNC: 38 MG/DL
LDLC SERPL CALC-MCNC: 62 MG/DL
MICROALBUMIN UR-MCNC: 129 MG/L
MICROALBUMIN/CREAT UR: 164.96 MG/G CR (ref 0–17)
NONHDLC SERPL-MCNC: 78 MG/DL
POTASSIUM SERPL-SCNC: 4.9 MMOL/L (ref 3.4–5.3)
PROT SERPL-MCNC: 7.9 G/DL (ref 6.8–8.8)
PSA SERPL-ACNC: 1.21 UG/L (ref 0–4)
SODIUM SERPL-SCNC: 139 MMOL/L (ref 133–144)
TRIGL SERPL-MCNC: 80 MG/DL
TSH SERPL DL<=0.005 MIU/L-ACNC: 3.09 MU/L (ref 0.4–4)

## 2021-03-08 DIAGNOSIS — E11.8 TYPE 2 DIABETES MELLITUS WITH COMPLICATION, WITHOUT LONG-TERM CURRENT USE OF INSULIN (H): ICD-10-CM

## 2021-03-10 RX ORDER — GLIPIZIDE 10 MG/1
TABLET, FILM COATED, EXTENDED RELEASE ORAL
Qty: 90 TABLET | Refills: 0 | Status: SHIPPED | OUTPATIENT
Start: 2021-03-10 | End: 2021-05-07

## 2021-03-10 NOTE — TELEPHONE ENCOUNTER
Pending Prescriptions:                       Disp   Refills    glipiZIDE (GLUCOTROL XL) 10 MG 24 hr table*90 tab*0        Sig: Take 1 tablet by mouth once daily    Routing refill request to provider for review/approval because:  Creatinine   Date Value Ref Range Status   10/12/2020 1.42 (H) 0.66 - 1.25 mg/dL Final

## 2021-05-07 DIAGNOSIS — E11.8 TYPE 2 DIABETES MELLITUS WITH COMPLICATION, WITHOUT LONG-TERM CURRENT USE OF INSULIN (H): ICD-10-CM

## 2021-05-07 NOTE — LETTER
[facility]  [address]  [facility] [address]  Phone: [phone]        May 10, 2021      Adal Paulo                                                                                                                                [address] [address]            Dear Mr. Bates,    We are concerned about your health care.  We recently provided you with a medication refill.  Many medications require routine follow-up with your Doctor.      At this time we ask that: You schedule a routine office visit with your physician for follow up.  You were due in April.  Please call [phone] to schedule an appointment before your next refill is needed.      Your prescription: Has been refilled once so you may have time for the above noted follow-up.      Thank you,      [facility]

## 2021-05-10 RX ORDER — GLIPIZIDE 10 MG/1
10 TABLET, FILM COATED, EXTENDED RELEASE ORAL DAILY
Qty: 90 TABLET | Refills: 0 | Status: SHIPPED | OUTPATIENT
Start: 2021-05-10 | End: 2021-07-30

## 2021-05-10 NOTE — TELEPHONE ENCOUNTER
Medication is being filled for 1 time refill only due to:  Patient needs to be seen because due for 6 month follow up.    Latter mailed to patient.

## 2021-05-28 ENCOUNTER — OFFICE VISIT (OUTPATIENT)
Dept: INTERNAL MEDICINE | Facility: CLINIC | Age: 76
End: 2021-05-28
Payer: COMMERCIAL

## 2021-05-28 VITALS
OXYGEN SATURATION: 100 % | WEIGHT: 162 LBS | BODY MASS INDEX: 24.55 KG/M2 | SYSTOLIC BLOOD PRESSURE: 130 MMHG | TEMPERATURE: 97.4 F | HEIGHT: 68 IN | HEART RATE: 73 BPM | DIASTOLIC BLOOD PRESSURE: 82 MMHG

## 2021-05-28 DIAGNOSIS — E11.8 TYPE 2 DIABETES MELLITUS WITH COMPLICATION, WITHOUT LONG-TERM CURRENT USE OF INSULIN (H): Primary | ICD-10-CM

## 2021-05-28 DIAGNOSIS — R20.2 PARESTHESIA: ICD-10-CM

## 2021-05-28 DIAGNOSIS — E55.9 VITAMIN D DEFICIENCY: ICD-10-CM

## 2021-05-28 DIAGNOSIS — E78.5 HYPERLIPIDEMIA WITH TARGET LDL LESS THAN 70: ICD-10-CM

## 2021-05-28 DIAGNOSIS — N18.30 STAGE 3 CHRONIC KIDNEY DISEASE, UNSPECIFIED WHETHER STAGE 3A OR 3B CKD (H): ICD-10-CM

## 2021-05-28 DIAGNOSIS — I25.5 ISCHEMIC CARDIOMYOPATHY: ICD-10-CM

## 2021-05-28 DIAGNOSIS — I25.10 CORONARY ARTERY DISEASE INVOLVING NATIVE CORONARY ARTERY OF NATIVE HEART WITHOUT ANGINA PECTORIS: ICD-10-CM

## 2021-05-28 LAB
ALBUMIN SERPL-MCNC: 3.7 G/DL (ref 3.4–5)
ALP SERPL-CCNC: 82 U/L (ref 40–150)
ALT SERPL W P-5'-P-CCNC: 34 U/L (ref 0–70)
ANION GAP SERPL CALCULATED.3IONS-SCNC: 3 MMOL/L (ref 3–14)
AST SERPL W P-5'-P-CCNC: 12 U/L (ref 0–45)
BILIRUB SERPL-MCNC: 1.5 MG/DL (ref 0.2–1.3)
BUN SERPL-MCNC: 17 MG/DL (ref 7–30)
CALCIUM SERPL-MCNC: 9.5 MG/DL (ref 8.5–10.1)
CHLORIDE SERPL-SCNC: 107 MMOL/L (ref 94–109)
CO2 SERPL-SCNC: 29 MMOL/L (ref 20–32)
CREAT SERPL-MCNC: 1.43 MG/DL (ref 0.66–1.25)
ERYTHROCYTE [DISTWIDTH] IN BLOOD BY AUTOMATED COUNT: 14.9 % (ref 10–15)
GFR SERPL CREATININE-BSD FRML MDRD: 47 ML/MIN/{1.73_M2}
GLUCOSE SERPL-MCNC: 132 MG/DL (ref 70–99)
HBA1C MFR BLD: 7.6 % (ref 0–5.6)
HCT VFR BLD AUTO: 44.5 % (ref 40–53)
HGB BLD-MCNC: 14.1 G/DL (ref 13.3–17.7)
MCH RBC QN AUTO: 27.4 PG (ref 26.5–33)
MCHC RBC AUTO-ENTMCNC: 31.7 G/DL (ref 31.5–36.5)
MCV RBC AUTO: 86 FL (ref 78–100)
PLATELET # BLD AUTO: 215 10E9/L (ref 150–450)
POTASSIUM SERPL-SCNC: 4.9 MMOL/L (ref 3.4–5.3)
PROT SERPL-MCNC: 7.6 G/DL (ref 6.8–8.8)
RBC # BLD AUTO: 5.15 10E12/L (ref 4.4–5.9)
SODIUM SERPL-SCNC: 139 MMOL/L (ref 133–144)
TSH SERPL DL<=0.005 MIU/L-ACNC: 2.55 MU/L (ref 0.4–4)
VIT B12 SERPL-MCNC: 3782 PG/ML (ref 193–986)
WBC # BLD AUTO: 6.9 10E9/L (ref 4–11)

## 2021-05-28 PROCEDURE — 84443 ASSAY THYROID STIM HORMONE: CPT | Performed by: INTERNAL MEDICINE

## 2021-05-28 PROCEDURE — 85027 COMPLETE CBC AUTOMATED: CPT | Performed by: INTERNAL MEDICINE

## 2021-05-28 PROCEDURE — 80053 COMPREHEN METABOLIC PANEL: CPT | Performed by: INTERNAL MEDICINE

## 2021-05-28 PROCEDURE — 82043 UR ALBUMIN QUANTITATIVE: CPT | Performed by: INTERNAL MEDICINE

## 2021-05-28 PROCEDURE — 82306 VITAMIN D 25 HYDROXY: CPT | Performed by: INTERNAL MEDICINE

## 2021-05-28 PROCEDURE — 36415 COLL VENOUS BLD VENIPUNCTURE: CPT | Performed by: INTERNAL MEDICINE

## 2021-05-28 PROCEDURE — 99214 OFFICE O/P EST MOD 30 MIN: CPT | Performed by: INTERNAL MEDICINE

## 2021-05-28 PROCEDURE — 83036 HEMOGLOBIN GLYCOSYLATED A1C: CPT | Performed by: INTERNAL MEDICINE

## 2021-05-28 PROCEDURE — 82607 VITAMIN B-12: CPT | Performed by: INTERNAL MEDICINE

## 2021-05-28 ASSESSMENT — MIFFLIN-ST. JEOR: SCORE: 1439.33

## 2021-05-28 NOTE — PROGRESS NOTES
Assessment & Plan     Type 2 diabetes mellitus with complication, without long-term current use of insulin (H)  Assess lab , cont treatment   - CBC with platelets  - Comprehensive metabolic panel  - TSH with free T4 reflex  - Hemoglobin A1c  - Albumin Random Urine Quantitative with Creat Ratio    Stage 3 chronic kidney disease, unspecified whether stage 3a or 3b CKD  Monitor renal function     Hyperlipidemia with target LDL less than 70  Continue statin, controlled     Coronary artery disease involving native coronary artery of native heart without angina pectoris  Assess ECHO   - CBC with platelets  - Comprehensive metabolic panel  - TSH with free T4 reflex  - Echocardiogram Complete; Future    Paresthesia  Assess lab   - Vitamin B12    Vitamin D deficiency  Assess vit D   - Vitamin D Deficiency    Ischemic cardiomyopathy    - Echocardiogram Complete; Future             See Patient Instructions    Return in about 6 months (around 11/28/2021).    Alvaro Lindo MD  [facility] ROJELIO Galeas is a 75 year old who presents for the following health issues  accompanied by his spouse:    HPI       Patient is seen for a follow up visit.    Concern for feet swelling for several days, when doing his taxes.   Improved with TEDs and feet elevation.   Feels pain in the toes, achy, no related to walking, feels numb.     Diabetes Follow-up  Has H/O DM. On diet , exercise and oral treatment. Blood sugars are controlled. No parestesias. No hypoglycemias.  -110, fasting.     How often are you checking your blood sugar? A few times a week  What time of day are you checking your blood sugars (select all that apply)?  Before meals  Have you had any blood sugars above 200?  No  Have you had any blood sugars below 70?  No    What symptoms do you notice when your blood sugar is low?  None    What concerns do you have today about your diabetes? None     Do you have any of these symptoms?  "(Select all that apply)  No numbness or tingling in feet.  No redness, sores or blisters on feet.  No complaints of excessive thirst.  No reports of blurry vision.  No significant changes to weight.    Have you had a diabetic eye exam in the last 12 months? No          Hyperlipidemia Follow-Up  Has H/O hyperlipidemia. On medical treatment and diet. No side effects. No muscle weakness, myalgias or upset stomach.       Are you regularly taking any medication or supplement to lower your cholesterol?   Yes- Atorvastatin    Are you having muscle aches or other side effects that you think could be caused by your cholesterol lowering medication?  No    Hypertension Follow-up  Has h/o HTN. on medical treatment. BP has been controlled. No side effects from medications. No CP, HA, dizziness. good compliance with medications and low salt diet.      Do you check your blood pressure regularly outside of the clinic? Yes     Are you following a low salt diet? Yes    Are your blood pressures ever more than 140 on the top number (systolic) OR more   than 90 on the bottom number (diastolic), for example 140/90? Yes,occasionally    BP Readings from Last 2 Encounters:   05/28/21 130/82   10/12/20 128/82     Hemoglobin A1C (%)   Date Value   10/12/2020 7.1 (H)   11/18/2019 7.5 (H)     LDL Cholesterol Calculated (mg/dL)   Date Value   10/12/2020 62   11/18/2019 55             Review of Systems   Constitutional, HEENT, cardiovascular, pulmonary, gi and gu systems are negative, except as otherwise noted.      Objective    /82 (BP Location: Left arm, Patient Position: Sitting, Cuff Size: Adult Regular)   Pulse 73   Temp 97.4  F (36.3  C) (Oral)   Ht 1.727 m (5' 8\")   Wt 73.5 kg (162 lb)   SpO2 100%   BMI 24.63 kg/m    Body mass index is 24.63 kg/m .  Physical Exam   GENERAL: healthy, alert and no distress  EYES: Eyes grossly normal to inspection, PERRL and conjunctivae and sclerae normal  HENT: ear canals and TM's normal, nose " and mouth without ulcers or lesions  NECK: no adenopathy, no asymmetry, masses, or scars and thyroid normal to palpation  RESP: lungs clear to auscultation - no rales, rhonchi or wheezes  CV: regular rate and rhythm, normal S1 S2, no S3 or S4, 4/6 systolic murmur, no click or rub, no peripheral edema and peripheral pulses strong  ABDOMEN: soft, nontender, no hepatosplenomegaly, no masses and bowel sounds normal  MS: no gross musculoskeletal defects noted, no edema  SKIN: no suspicious lesions or rashes  NEURO: Normal strength and tone, mentation intact and speech normal  Diabetic foot exam: normal DP and PT pulses, no trophic changes or ulcerative lesions and normal sensory exam    Office Visit on 10/12/2020   Component Date Value Ref Range Status     WBC 10/12/2020 7.1  4.0 - 11.0 10e9/L Final     RBC Count 10/12/2020 5.31  4.4 - 5.9 10e12/L Final     Hemoglobin 10/12/2020 14.6  13.3 - 17.7 g/dL Final     Hematocrit 10/12/2020 46.1  40.0 - 53.0 % Final     MCV 10/12/2020 87  78 - 100 fl Final     MCH 10/12/2020 27.5  26.5 - 33.0 pg Final     MCHC 10/12/2020 31.7  31.5 - 36.5 g/dL Final     RDW 10/12/2020 15.1* 10.0 - 15.0 % Final     Platelet Count 10/12/2020 197  150 - 450 10e9/L Final     Sodium 10/12/2020 139  133 - 144 mmol/L Final     Potassium 10/12/2020 4.9  3.4 - 5.3 mmol/L Final     Chloride 10/12/2020 107  94 - 109 mmol/L Final     Carbon Dioxide 10/12/2020 23  20 - 32 mmol/L Final     Anion Gap 10/12/2020 9  3 - 14 mmol/L Final     Glucose 10/12/2020 126* 70 - 99 mg/dL Final    Fasting specimen     Urea Nitrogen 10/12/2020 20  7 - 30 mg/dL Final     Creatinine 10/12/2020 1.42* 0.66 - 1.25 mg/dL Final     GFR Estimate 10/12/2020 48* >60 mL/min/[1.73_m2] Final    Comment: Non  GFR Calc  Starting 12/18/2018, serum creatinine based estimated GFR (eGFR) will be   calculated using the Chronic Kidney Disease Epidemiology Collaboration   (CKD-EPI) equation.       GFR Estimate If Black 10/12/2020  55* >60 mL/min/[1.73_m2] Final    Comment:  GFR Calc  Starting 12/18/2018, serum creatinine based estimated GFR (eGFR) will be   calculated using the Chronic Kidney Disease Epidemiology Collaboration   (CKD-EPI) equation.       Calcium 10/12/2020 9.8  8.5 - 10.1 mg/dL Final     Bilirubin Total 10/12/2020 1.6* 0.2 - 1.3 mg/dL Final     Albumin 10/12/2020 3.7  3.4 - 5.0 g/dL Final     Protein Total 10/12/2020 7.9  6.8 - 8.8 g/dL Final     Alkaline Phosphatase 10/12/2020 84  40 - 150 U/L Final     ALT 10/12/2020 25  0 - 70 U/L Final     AST 10/12/2020 18  0 - 45 U/L Final     Cholesterol 10/12/2020 116  <200 mg/dL Final     Triglycerides 10/12/2020 80  <150 mg/dL Final    Fasting specimen     HDL Cholesterol 10/12/2020 38* >39 mg/dL Final     LDL Cholesterol Calculated 10/12/2020 62  <100 mg/dL Final    Desirable:       <100 mg/dl     Non HDL Cholesterol 10/12/2020 78  <130 mg/dL Final     TSH 10/12/2020 3.09  0.40 - 4.00 mU/L Final     PSA 10/12/2020 1.21  0 - 4 ug/L Final    Assay Method:  Chemiluminescence using Siemens Vista analyzer     Hemoglobin A1C 10/12/2020 7.1* 0 - 5.6 % Final    Comment: Normal <5.7% Prediabetes 5.7-6.4%  Diabetes 6.5% or higher - adopted from ADA   consensus guidelines.       Creatinine Urine 10/12/2020 78  mg/dL Final     Albumin Urine mg/L 10/12/2020 129  mg/L Final     Albumin Urine mg/g Cr 10/12/2020 164.96* 0 - 17 mg/g Cr Final     Vitamin B12 10/12/2020 150* 193 - 986 pg/mL Final

## 2021-05-29 LAB
CREAT UR-MCNC: 78 MG/DL
MICROALBUMIN UR-MCNC: 195 MG/L
MICROALBUMIN/CREAT UR: 250.96 MG/G CR (ref 0–17)

## 2021-05-30 LAB — DEPRECATED CALCIDIOL+CALCIFEROL SERPL-MC: 33 UG/L (ref 20–75)

## 2021-06-03 RX ORDER — METFORMIN HCL 500 MG
500 TABLET, EXTENDED RELEASE 24 HR ORAL
Qty: 90 TABLET | Refills: 1 | Status: SHIPPED | OUTPATIENT
Start: 2021-06-03 | End: 2021-10-22

## 2021-06-08 ENCOUNTER — TELEPHONE (OUTPATIENT)
Dept: INTERNAL MEDICINE | Facility: CLINIC | Age: 76
End: 2021-06-08

## 2021-06-08 DIAGNOSIS — E11.8 TYPE 2 DIABETES MELLITUS WITH COMPLICATION, WITHOUT LONG-TERM CURRENT USE OF INSULIN (H): ICD-10-CM

## 2021-06-08 RX ORDER — METFORMIN HCL 500 MG
TABLET, EXTENDED RELEASE 24 HR ORAL
Qty: 360 TABLET | Refills: 3
Start: 2021-06-08 | End: 2021-11-23

## 2021-06-08 NOTE — TELEPHONE ENCOUNTER
Pts wife calls. They would like to wait on taking Januvia.  They want to work on Diet and exercise for 3 month first.   Their son is a Nephrologist and he reviewed all his lab results and thinks pt can work on diet first.     Informed her that pt can check labs again in 3 months.     Pended lab work.     No call back needed if this is OK.

## 2021-06-28 ENCOUNTER — HOSPITAL ENCOUNTER (OUTPATIENT)
Dept: CARDIOLOGY | Facility: CLINIC | Age: 76
Discharge: HOME | End: 2021-06-28
Attending: INTERNAL MEDICINE | Admitting: INTERNAL MEDICINE
Payer: COMMERCIAL

## 2021-06-28 DIAGNOSIS — I25.5 ISCHEMIC CARDIOMYOPATHY: ICD-10-CM

## 2021-06-28 DIAGNOSIS — I25.10 CORONARY ARTERY DISEASE INVOLVING NATIVE CORONARY ARTERY OF NATIVE HEART WITHOUT ANGINA PECTORIS: ICD-10-CM

## 2021-06-28 PROCEDURE — 93306 TTE W/DOPPLER COMPLETE: CPT | Mod: 26 | Performed by: INTERNAL MEDICINE

## 2021-06-28 PROCEDURE — 93306 TTE W/DOPPLER COMPLETE: CPT

## 2021-06-30 ENCOUNTER — TELEPHONE (OUTPATIENT)
Dept: INTERNAL MEDICINE | Facility: CLINIC | Age: 76
End: 2021-06-30

## 2021-07-29 DIAGNOSIS — I25.5 ISCHEMIC CARDIOMYOPATHY: ICD-10-CM

## 2021-07-29 DIAGNOSIS — I10 BENIGN ESSENTIAL HYPERTENSION: ICD-10-CM

## 2021-07-29 DIAGNOSIS — E11.8 TYPE 2 DIABETES MELLITUS WITH COMPLICATION, WITHOUT LONG-TERM CURRENT USE OF INSULIN (H): ICD-10-CM

## 2021-07-29 DIAGNOSIS — N40.1 BENIGN PROSTATIC HYPERPLASIA WITH URINARY FREQUENCY: ICD-10-CM

## 2021-07-29 DIAGNOSIS — R35.0 BENIGN PROSTATIC HYPERPLASIA WITH URINARY FREQUENCY: ICD-10-CM

## 2021-07-30 RX ORDER — LOSARTAN POTASSIUM 100 MG/1
100 TABLET ORAL DAILY
Qty: 90 TABLET | Refills: 3 | Status: SHIPPED | OUTPATIENT
Start: 2021-07-30 | End: 2021-08-06

## 2021-07-30 RX ORDER — GLIPIZIDE 10 MG/1
TABLET, FILM COATED, EXTENDED RELEASE ORAL
Qty: 90 TABLET | Refills: 0 | Status: SHIPPED | OUTPATIENT
Start: 2021-07-30 | End: 2021-10-22

## 2021-07-30 RX ORDER — FINASTERIDE 5 MG/1
5 TABLET, FILM COATED ORAL DAILY
Qty: 90 TABLET | Refills: 2 | Status: SHIPPED | OUTPATIENT
Start: 2021-07-30 | End: 2022-08-05

## 2021-07-30 NOTE — TELEPHONE ENCOUNTER
Routing refill request to provider for review/approval because:  Labs out of range:    Creatinine   Date Value Ref Range Status   05/28/2021 1.43 (H) 0.66 - 1.25 mg/dL Final     Ryanne Way RN, BSN

## 2021-08-06 ENCOUNTER — OFFICE VISIT (OUTPATIENT)
Dept: CARDIOLOGY | Facility: CLINIC | Age: 76
End: 2021-08-06
Payer: COMMERCIAL

## 2021-08-06 VITALS
DIASTOLIC BLOOD PRESSURE: 78 MMHG | WEIGHT: 162.9 LBS | HEART RATE: 72 BPM | HEIGHT: 68 IN | BODY MASS INDEX: 24.69 KG/M2 | SYSTOLIC BLOOD PRESSURE: 128 MMHG

## 2021-08-06 DIAGNOSIS — I50.22 CHRONIC SYSTOLIC HEART FAILURE (H): Primary | ICD-10-CM

## 2021-08-06 PROCEDURE — 99205 OFFICE O/P NEW HI 60 MIN: CPT | Performed by: INTERNAL MEDICINE

## 2021-08-06 RX ORDER — SACUBITRIL AND VALSARTAN 49; 51 MG/1; MG/1
1 TABLET, FILM COATED ORAL 2 TIMES DAILY
Qty: 120 TABLET | Refills: 3 | Status: SHIPPED | OUTPATIENT
Start: 2021-08-06 | End: 2021-09-07

## 2021-08-06 ASSESSMENT — MIFFLIN-ST. JEOR: SCORE: 1443.28
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
## 2021-08-06 NOTE — PROGRESS NOTES
Service Date: 08/06/2021    REASON FOR VISIT:  Heart failure consultation.    HISTORY OF PRESENT ILLNESS:  Adal Bates is a very pleasant 75-year-old gentleman whose son is a nephrologist in [place], who I also had a conversation with over the phone today.  The patient is here with his wife today.  He is seeing me as a new patient enrollment in the heart failure clinic for new consultation.  The patient is 75.  He has a history of severe ischemic cardiomyopathy along with hypertension and dyslipidemia that was diagnosed in 2016.  EF at that time was about 25%.  He had inadequately controlled hypertension.  Subsequent coronary angiography revealed very severe multivessel coronary disease.  An MRI did show significant viability of the left ventricle.  He had a chronic left bundle branch block.  In 05/2016, he underwent 5-vessel bypass surgery.  He was seen by Dr. Hall.  Since then, he was told to follow up in 6 months in clinic but did not.  It has been 5 years that he has seen Cardiology.  He says overall clinically he has been feeling well.    In March of this year, he had a little bit of lower extremity edema and he mentioned that to his PCP.  That was thought to be dependent, but in any case, an echocardiogram was ordered that showed an EF of about 20%-25%.  Of note, in 2018, that is a couple of years after bypass surgery, his EF had actually improved to about 30%-35% but now there has been a decline.  Given this decline, he was referred back to Cardiology.    Today Adal is here in clinic with his wife.  As mentioned, his son who is a nephrologist was on the phone.  The patient denies any new cardiovascular symptoms.  He is NYHA class II.  No clinical symptoms of angina.  Does complain of minimal functional limitations but otherwise still overall functionally quite active for being 77.  No syncope, presyncope.    Current medications include aspirin 81, atorvastatin 40, losartan 100 mg daily, glipizide,  metformin, metoprolol succinate 37.5 mg twice a day.    PHYSICAL EXAMINATION:    VITAL SIGNS:  Blood pressure is 128/78 with a pulse of 72.  GENERAL:  Alert, oriented x3, in no acute distress.  NECK:  Supple.  JVP normal.  LUNGS:  Clear.  HEART:  Cardiac sounds are regular without rubs or gallops, but there is a 3/6 pansystolic murmur best heard in the right parasternal border and a 2/6 pansystolic murmur heard in the mitral area.  Soft ejection systolic murmur as well.  EXTREMITIES:  Warm without edema.  PSYCHIATRIC:  Appropriate affect.  HEENT:  No icterus or pallor.    PERTINENT DATA:  Creatinine in May of this year was 1.43 with a potassium of 4.9.  His potassium has been in the 4.2 to 4.9 range.  Stage III kidney disease with GFR in the 40s to 50s.  Last A1c was 7.6, hemoglobin normal at 14.1.  Last echocardiogram 2021 showing a normal size LV.  I have reviewed the echo personally.  EF is about 19% on biplane.  There are severe multiple wall motion abnormalities in the inferior wall, inferior septum, anterior anteroseptum and apical wall.  Ascending aorta is moderately dilated, measuring 4.3 cm.  Visually, there is probably mild aortic stenosis but due to LV dysfunction.  The gradient is not significantly elevated.  Moderate TR and mild MR.    ASSESSMENT AND PLAN:  Adal is 75 with ischemic cardiomyopathy and worsening LV remodeling.  Overall, he is NYHA II and does not have any significant clinical angina.  However, his EF is declining and I do not like the pattern of his worsening LV remodeling at this time.  I recommend the followin.  I would like to rule out significant ischemia and progression of occult coronary disease.  Given his left bundle, we cannot exercise him.  We will get Lexiscan nuclear stress test.  2.  I would like to enroll him into [facility] for closer heart failure followup moving forward.  3.  I would like to change his losartan from 100 mg daily to level 2 Entresto  b.i.d. starting tomorrow.  I spoke to his son.  The patient is traveling to [place] on Monday and he will get a BMP level checked locally in the son's office in about 2 weeks from now to ensure his potassium and kidney function are stable.  4.  I want him to come back in about a month and enroll with a [facility] for followup.  I want to personally see him back in 3 months with an echocardiogram after up titration of medical therapy.  5.  In a month after this, if the BMP is stable and he is tolerating Entresto well and kidney function is stable, I would recommend addition of dapagliflozin 5 mg to his regimen.  6.  Lastly, we did discuss the utility, rationale, data, risks, benefits behind ICD/CRT-D.  He is greater than 69 years of age.  However, he does have ventricular dyssynchrony, left bundle branch block, and if he continues to have persistent LV dysfunction, a CRT with or without a defibrillator could be considered. Having said that if the scarring burden is high in the LV, we may not get much reverse remodeling with CRT and he may end up being a non responder.  He is going to think about it.  The patient says his son's father-in-law is a cardiologist who he is meeting in [place] next week and they are going to discuss these issues.    It was a pleasure seeing Oleg. I would be happy to see him back in [facility] for ongoing followup, but I personally want to plan to see him closely in 3 months from now.    cc:  Alvaro Lindo MD  [facility] [address] [place]  [address]      Flavio Garcia MD        D: 2021   T: 2021   MT: mary ann    Name:     OLEG SANCHEZ  MRN:      [id number]        Account:      [id number]   :      1945           Service Date: 2021       Document: [id number]

## 2021-08-31 DIAGNOSIS — I25.5 ISCHEMIC CARDIOMYOPATHY: Primary | ICD-10-CM

## 2021-08-31 NOTE — PROGRESS NOTES
[facility] CARLOS    Orders         The following orders were placed for CARLOS OROZCO.    Excerpt from Dr. Garcia's Office Note on 8/6/21        Randee Fernandez RN  Care Coordinator  [facility] GONZALEZ Nurse Line: [phone]  / Personal Line: [phone]  08/31/21 2:40 PM

## 2021-09-02 ENCOUNTER — LAB (OUTPATIENT)
Dept: LAB | Facility: CLINIC | Age: 76
End: 2021-09-02
Payer: COMMERCIAL

## 2021-09-02 DIAGNOSIS — I50.22 CHRONIC SYSTOLIC HEART FAILURE (H): ICD-10-CM

## 2021-09-02 LAB
ANION GAP SERPL CALCULATED.3IONS-SCNC: 3 MMOL/L (ref 3–14)
BUN SERPL-MCNC: 19 MG/DL (ref 7–30)
CALCIUM SERPL-MCNC: 9.1 MG/DL (ref 8.5–10.1)
CHLORIDE BLD-SCNC: 109 MMOL/L (ref 94–109)
CO2 SERPL-SCNC: 27 MMOL/L (ref 20–32)
CREAT SERPL-MCNC: 1.54 MG/DL (ref 0.66–1.25)
GFR SERPL CREATININE-BSD FRML MDRD: 43 ML/MIN/1.73M2
GLUCOSE BLD-MCNC: 187 MG/DL (ref 70–99)
POTASSIUM BLD-SCNC: 4.7 MMOL/L (ref 3.4–5.3)
SODIUM SERPL-SCNC: 139 MMOL/L (ref 133–144)

## 2021-09-02 PROCEDURE — 36415 COLL VENOUS BLD VENIPUNCTURE: CPT

## 2021-09-02 PROCEDURE — 80048 BASIC METABOLIC PNL TOTAL CA: CPT

## 2021-09-07 ENCOUNTER — HOSPITAL ENCOUNTER (OUTPATIENT)
Dept: NUCLEAR MEDICINE | Facility: CLINIC | Age: 76
Setting detail: NUCLEAR MEDICINE
End: 2021-09-07
Attending: INTERNAL MEDICINE
Payer: COMMERCIAL

## 2021-09-07 ENCOUNTER — CARE COORDINATION (OUTPATIENT)
Dept: CARDIOLOGY | Facility: CLINIC | Age: 76
End: 2021-09-07

## 2021-09-07 ENCOUNTER — HOSPITAL ENCOUNTER (OUTPATIENT)
Dept: NUCLEAR MEDICINE | Facility: CLINIC | Age: 76
Setting detail: NUCLEAR MEDICINE
Discharge: HOME | End: 2021-09-07
Attending: INTERNAL MEDICINE | Admitting: INTERNAL MEDICINE
Payer: COMMERCIAL

## 2021-09-07 ENCOUNTER — HOSPITAL ENCOUNTER (OUTPATIENT)
Dept: CARDIOLOGY | Facility: CLINIC | Age: 76
Discharge: HOME | End: 2021-09-07
Attending: INTERNAL MEDICINE | Admitting: INTERNAL MEDICINE
Payer: COMMERCIAL

## 2021-09-07 DIAGNOSIS — I50.22 CHRONIC SYSTOLIC HEART FAILURE (H): ICD-10-CM

## 2021-09-07 LAB
CV BLOOD PRESSURE: 23 MMHG
CV STRESS MAX HR HE: 78
NUC STRESS EJECTION FRACTION: 17 %
RATE PRESSURE PRODUCT: NORMAL
STRESS ECHO BASELINE DIASTOLIC HE: 83
STRESS ECHO BASELINE HR: 64
STRESS ECHO BASELINE SYSTOLIC BP: 140
STRESS ECHO CALCULATED PERCENT HR: 54 %
STRESS ECHO LAST STRESS DIASTOLIC BP: 81
STRESS ECHO LAST STRESS SYSTOLIC BP: 143
STRESS ECHO TARGET HR: 144
STRESS/REST PERFUSION RATIO: 1.03

## 2021-09-07 PROCEDURE — 343N000001 HC RX 343: Performed by: INTERNAL MEDICINE

## 2021-09-07 PROCEDURE — 93017 CV STRESS TEST TRACING ONLY: CPT

## 2021-09-07 PROCEDURE — 93016 CV STRESS TEST SUPVJ ONLY: CPT | Performed by: INTERNAL MEDICINE

## 2021-09-07 PROCEDURE — 78452 HT MUSCLE IMAGE SPECT MULT: CPT

## 2021-09-07 PROCEDURE — 93018 CV STRESS TEST I&R ONLY: CPT | Performed by: INTERNAL MEDICINE

## 2021-09-07 PROCEDURE — 250N000011 HC RX IP 250 OP 636: Performed by: INTERNAL MEDICINE

## 2021-09-07 PROCEDURE — A9502 TC99M TETROFOSMIN: HCPCS | Performed by: INTERNAL MEDICINE

## 2021-09-07 PROCEDURE — 78452 HT MUSCLE IMAGE SPECT MULT: CPT | Mod: 26 | Performed by: INTERNAL MEDICINE

## 2021-09-07 RX ORDER — REGADENOSON 0.08 MG/ML
0.4 INJECTION, SOLUTION INTRAVENOUS ONCE
Status: COMPLETED | OUTPATIENT
Start: 2021-09-07 | End: 2021-09-07

## 2021-09-07 RX ORDER — AMINOPHYLLINE 25 MG/ML
50-100 INJECTION, SOLUTION INTRAVENOUS
Status: DISCONTINUED | OUTPATIENT
Start: 2021-09-07 | End: 2021-09-08 | Stop reason: HOSPADM

## 2021-09-07 RX ORDER — CAFFEINE CITRATE 20 MG/ML
60 SOLUTION INTRAVENOUS
Status: DISCONTINUED | OUTPATIENT
Start: 2021-09-07 | End: 2021-09-08 | Stop reason: HOSPADM

## 2021-09-07 RX ORDER — ACYCLOVIR 200 MG/1
0-1 CAPSULE ORAL
Status: DISCONTINUED | OUTPATIENT
Start: 2021-09-07 | End: 2021-09-08 | Stop reason: HOSPADM

## 2021-09-07 RX ORDER — ALBUTEROL SULFATE 90 UG/1
2 AEROSOL, METERED RESPIRATORY (INHALATION) EVERY 5 MIN PRN
Status: DISCONTINUED | OUTPATIENT
Start: 2021-09-07 | End: 2021-09-08 | Stop reason: HOSPADM

## 2021-09-07 RX ORDER — REGADENOSON 0.08 MG/ML
INJECTION, SOLUTION INTRAVENOUS
Status: DISCONTINUED
Start: 2021-09-07 | End: 2021-09-07 | Stop reason: HOSPADM

## 2021-09-07 RX ORDER — SACUBITRIL AND VALSARTAN 49; 51 MG/1; MG/1
1 TABLET, FILM COATED ORAL 2 TIMES DAILY
Qty: 120 TABLET | Refills: 3 | Status: SHIPPED | OUTPATIENT
Start: 2021-09-07 | End: 2021-12-01

## 2021-09-07 RX ADMIN — TETROFOSMIN 10.5 MCI.: 1.38 INJECTION, POWDER, LYOPHILIZED, FOR SOLUTION INTRAVENOUS at 08:46

## 2021-09-07 RX ADMIN — REGADENOSON 0.4 MG: 0.08 INJECTION, SOLUTION INTRAVENOUS at 10:22

## 2021-09-07 RX ADMIN — TETROFOSMIN 31 MCI.: 1.38 INJECTION, POWDER, LYOPHILIZED, FOR SOLUTION INTRAVENOUS at 10:36

## 2021-09-07 NOTE — PROGRESS NOTES
Routed results to Dr. Garcia. Patient had recent new patient OV in August. History of severe ischemic CM, CABG in 2016. New drop in EF based on recent Echo and LV remodeling prompted Lexiscan. Patient has [facility] Enrollment with Maryann on 9/9. Patience Steiner RN on 9/7/2021 at 4:56 PM      Result Text        The nuclear stress test is abnormal.     There is nontransmural infarction in the mid to basal anterior segment(s) of the left ventricle. No ischemia.     Large, fixed inferior and inferoseptal defect.  Suspect this is diaphragm attenuation, but cannot exclude infarct.     The left ventricular ejection fraction at rest is 23%.  The left ventricular ejection fraction at stress is 17%.     There is no prior study for comparison.  Cardiac MRI in 2016 showed anterior infarct, 100% viability in the circumflex and RCA territory.    ECG Summary    ECG Baseline electrocardiogram demonstrates sinus rhythm and left bundle branch block.   The stress electrocardiogram was non-diagnostic due to left bundle branch block.  There were no arrhythmias during stress.

## 2021-09-08 NOTE — TELEPHONE ENCOUNTER
No ischemia. Seeing Kristy. Please inform patient and his son. I recommend aggressive HF uptitration. See me in 2-3 weeks after seeing Kristy. Echo in 2 months from now with EP apt for CRT-D discussions.

## 2021-09-08 NOTE — PROGRESS NOTES
Routed Dr. Garcia's recommendations to KAYLA Anah, as pt has an appt with her tomorrow to review Lexiscan. Patience Steiner RN on 9/8/2021 at 4:13 PM        Flavio Garcia MD  You 13 minutes ago (3:59 PM)   KV     No ischemia. Seeing Kristy. Please inform patient and his son. I recommend aggressive HF uptitration. See me in 2-3 weeks after seeing Kristy. Echo in 2 months from now with EP apt for CRT-D discussions.          Documentation

## 2021-09-09 ENCOUNTER — OFFICE VISIT (OUTPATIENT)
Dept: CARDIOLOGY | Facility: CLINIC | Age: 76
End: 2021-09-09
Attending: INTERNAL MEDICINE
Payer: COMMERCIAL

## 2021-09-09 VITALS
DIASTOLIC BLOOD PRESSURE: 72 MMHG | HEART RATE: 68 BPM | HEIGHT: 68 IN | WEIGHT: 157.3 LBS | BODY MASS INDEX: 23.84 KG/M2 | SYSTOLIC BLOOD PRESSURE: 128 MMHG | OXYGEN SATURATION: 99 %

## 2021-09-09 DIAGNOSIS — I50.22 CHRONIC SYSTOLIC HEART FAILURE (H): ICD-10-CM

## 2021-09-09 PROCEDURE — 99214 OFFICE O/P EST MOD 30 MIN: CPT | Performed by: PHYSICIAN ASSISTANT

## 2021-09-09 ASSESSMENT — MIFFLIN-ST. JEOR: SCORE: 1418.01

## 2021-09-09 NOTE — PATIENT INSTRUCTIONS
Call [facility] nurse for any questions or concerns Mon-Fri 8am-4pm:                                                 #[phone]                                       For concerns after hours:                                               #[phone]         Plan from today: *  The stress test 9/7/21 showed prior injury to the heart, but no new areas of significant ischemia.     We would like to continue to increase the heart medications to the highest tolerated doses.  However, you have not had medications today and we do not know your home blood pressures, so I am hesitant to increase the medications today.      Next visit, please take your medications before the visit (at least 2 hours prior). This way we tamara know if your blood pressure can tolerate an increase in the medication.  Also bring your home blood pressure  Cuff with you so we can check its accuracy.     You are seeing Dr. Lindo soon to discuss Jardiance.  We also recommend this medication.     See us back in 2-3 weeks with some repeat labs (BMP, NT pro BNP).     Also in November with a repeat echocardiogram.             Lab results: see attached:   Component      Latest Ref Rng & Units 5/28/2021 9/2/2021   Sodium      133 - 144 mmol/L 139 139   Potassium      3.4 - 5.3 mmol/L 4.9 4.7   Chloride      94 - 109 mmol/L 107 109   Carbon Dioxide      20 - 32 mmol/L 29 27   Anion Gap      3 - 14 mmol/L 3 3   Glucose      70 - 99 mg/dL 132 (H) 187 (H)   Urea Nitrogen      7 - 30 mg/dL 17 19   Creatinine      0.66 - 1.25 mg/dL 1.43 (H) 1.54 (H)   GFR Estimate      >60 mL/min/1.73m2 47 (L) 43 (L)   GFR Estimate If Black      >60 mL/min/1.73:m2 55 (L)    Calcium      8.5 - 10.1 mg/dL 9.5 9.1   Bilirubin Total      0.2 - 1.3 mg/dL 1.5 (H)    Albumin      3.4 - 5.0 g/dL 3.7    Protein Total      6.8 - 8.8 g/dL 7.6    Alkaline Phosphatase      40 - 150 U/L 82    ALT      0 - 70 U/L 34    AST      0 - 45 U/L 12
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
## 2021-09-11 DIAGNOSIS — N40.1 BENIGN PROSTATIC HYPERPLASIA WITH URINARY FREQUENCY: ICD-10-CM

## 2021-09-11 DIAGNOSIS — R35.0 BENIGN PROSTATIC HYPERPLASIA WITH URINARY FREQUENCY: ICD-10-CM

## 2021-09-13 RX ORDER — FINASTERIDE 5 MG/1
TABLET, FILM COATED ORAL
Qty: 90 TABLET | Refills: 0 | OUTPATIENT
Start: 2021-09-13

## 2021-09-13 NOTE — TELEPHONE ENCOUNTER
Pending Prescriptions:                       Disp   Refills    finasteride (PROSCAR) 5 MG tablet [Pharma*90 tab*0            Sig: Take 1 tablet by mouth once daily      Duplicate request.  Medication e-scribed to pharmacy 7-30-21 #90 with 2 refills.    Medication this encounter refused.

## 2021-09-20 ENCOUNTER — LAB (OUTPATIENT)
Dept: LAB | Facility: CLINIC | Age: 76
End: 2021-09-20
Payer: COMMERCIAL

## 2021-09-20 DIAGNOSIS — E11.8 TYPE 2 DIABETES MELLITUS WITH COMPLICATION, WITHOUT LONG-TERM CURRENT USE OF INSULIN (H): ICD-10-CM

## 2021-09-20 LAB
ANION GAP SERPL CALCULATED.3IONS-SCNC: 5 MMOL/L (ref 3–14)
BUN SERPL-MCNC: 15 MG/DL (ref 7–30)
CALCIUM SERPL-MCNC: 9.1 MG/DL (ref 8.5–10.1)
CHLORIDE BLD-SCNC: 110 MMOL/L (ref 94–109)
CO2 SERPL-SCNC: 24 MMOL/L (ref 20–32)
CREAT SERPL-MCNC: 1.59 MG/DL (ref 0.66–1.25)
GFR SERPL CREATININE-BSD FRML MDRD: 42 ML/MIN/1.73M2
GLUCOSE BLD-MCNC: 87 MG/DL (ref 70–99)
HBA1C MFR BLD: 6.5 % (ref 0–5.6)
POTASSIUM BLD-SCNC: 4.2 MMOL/L (ref 3.4–5.3)
SODIUM SERPL-SCNC: 139 MMOL/L (ref 133–144)

## 2021-09-20 PROCEDURE — 80048 BASIC METABOLIC PNL TOTAL CA: CPT

## 2021-09-20 PROCEDURE — 83036 HEMOGLOBIN GLYCOSYLATED A1C: CPT

## 2021-09-20 PROCEDURE — 36415 COLL VENOUS BLD VENIPUNCTURE: CPT

## 2021-09-22 ENCOUNTER — LAB (OUTPATIENT)
Dept: LAB | Facility: CLINIC | Age: 76
End: 2021-09-22
Payer: COMMERCIAL

## 2021-09-22 DIAGNOSIS — I50.22 CHRONIC SYSTOLIC HEART FAILURE (H): ICD-10-CM

## 2021-09-22 LAB
ANION GAP SERPL CALCULATED.3IONS-SCNC: 8 MMOL/L (ref 3–14)
BUN SERPL-MCNC: 14 MG/DL (ref 7–30)
CALCIUM SERPL-MCNC: 9.1 MG/DL (ref 8.5–10.1)
CHLORIDE BLD-SCNC: 108 MMOL/L (ref 94–109)
CO2 SERPL-SCNC: 26 MMOL/L (ref 20–32)
CREAT SERPL-MCNC: 1.48 MG/DL (ref 0.66–1.25)
GFR SERPL CREATININE-BSD FRML MDRD: 45 ML/MIN/1.73M2
GLUCOSE BLD-MCNC: 177 MG/DL (ref 70–99)
NT-PROBNP SERPL-MCNC: 3402 PG/ML (ref 0–450)
POTASSIUM BLD-SCNC: 4.8 MMOL/L (ref 3.4–5.3)
SODIUM SERPL-SCNC: 142 MMOL/L (ref 133–144)

## 2021-09-22 PROCEDURE — 80048 BASIC METABOLIC PNL TOTAL CA: CPT | Performed by: PHYSICIAN ASSISTANT

## 2021-09-22 PROCEDURE — 36415 COLL VENOUS BLD VENIPUNCTURE: CPT | Performed by: PHYSICIAN ASSISTANT

## 2021-09-22 PROCEDURE — 83880 ASSAY OF NATRIURETIC PEPTIDE: CPT | Performed by: PHYSICIAN ASSISTANT

## 2021-09-29 ENCOUNTER — OFFICE VISIT (OUTPATIENT)
Dept: CARDIOLOGY | Facility: CLINIC | Age: 76
End: 2021-09-29
Payer: COMMERCIAL

## 2021-09-29 VITALS
HEART RATE: 73 BPM | DIASTOLIC BLOOD PRESSURE: 80 MMHG | SYSTOLIC BLOOD PRESSURE: 132 MMHG | OXYGEN SATURATION: 98 % | WEIGHT: 157.9 LBS | HEIGHT: 68 IN | BODY MASS INDEX: 23.93 KG/M2

## 2021-09-29 DIAGNOSIS — I50.43 ACUTE ON CHRONIC COMBINED SYSTOLIC (CONGESTIVE) AND DIASTOLIC (CONGESTIVE) HEART FAILURE (H): ICD-10-CM

## 2021-09-29 DIAGNOSIS — I25.5 ISCHEMIC CARDIOMYOPATHY: Primary | ICD-10-CM

## 2021-09-29 PROCEDURE — 99214 OFFICE O/P EST MOD 30 MIN: CPT | Performed by: PHYSICIAN ASSISTANT

## 2021-09-29 RX ORDER — SPIRONOLACTONE 25 MG/1
25 TABLET ORAL DAILY
Qty: 30 TABLET | Refills: 1 | Status: SHIPPED | OUTPATIENT
Start: 2021-09-29 | End: 2021-10-13

## 2021-09-29 ASSESSMENT — MIFFLIN-ST. JEOR: SCORE: 1420.73
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
## 2021-09-29 NOTE — PATIENT INSTRUCTIONS
Call [facility] nurse for any questions or concerns Mon-Fri 8am-4pm:                                                 #[phone]                                       For concerns after hours:                                               #[phone]             Lab results: see attached:   Component      Latest Ref Rng & Units 9/20/2021 9/22/2021   Sodium      133 - 144 mmol/L 139 142   Potassium      3.4 - 5.3 mmol/L 4.2 4.8   Chloride      94 - 109 mmol/L 110 (H) 108   Carbon Dioxide      20 - 32 mmol/L 24 26   Anion Gap      3 - 14 mmol/L 5 8   Urea Nitrogen      7 - 30 mg/dL 15 14   Creatinine      0.66 - 1.25 mg/dL 1.59 (H) 1.48 (H)   Calcium      8.5 - 10.1 mg/dL 9.1 9.1   Glucose      70 - 99 mg/dL 87 177 (H)   GFR Estimate      >60 mL/min/1.73m2 42 (L) 45 (L)   N-Terminal Pro Bnp      0 - 450 pg/mL  3,402 (H)       Plan from today:   The pumping function of your heart is low at 20-25%.   This, along with the lab above (NT pro BNP of 3402), we will add spironolactone 25 mg once daily.  Then in 2 weeks, we will repeat labs and see you back to check blood pressure.   If your labs and blood pressure are ok, we will also plan to increase the Toprol XL or the Entresto.      Please weigh daily and add this to your list.     Additionally, Dr. Lindo may adjust your diabetes medications to include a SGLT2 inhibitor.  He would start this for the additional heart benefits, not because of the diabetes is uncontrolled.
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
## 2021-10-13 ENCOUNTER — LAB (OUTPATIENT)
Dept: LAB | Facility: CLINIC | Age: 76
End: 2021-10-13
Payer: COMMERCIAL

## 2021-10-13 ENCOUNTER — OFFICE VISIT (OUTPATIENT)
Dept: CARDIOLOGY | Facility: CLINIC | Age: 76
End: 2021-10-13
Attending: PHYSICIAN ASSISTANT
Payer: COMMERCIAL

## 2021-10-13 VITALS
OXYGEN SATURATION: 99 % | DIASTOLIC BLOOD PRESSURE: 82 MMHG | BODY MASS INDEX: 23.75 KG/M2 | HEART RATE: 66 BPM | WEIGHT: 156.7 LBS | SYSTOLIC BLOOD PRESSURE: 122 MMHG | HEIGHT: 68 IN

## 2021-10-13 DIAGNOSIS — Z95.1 S/P CABG (CORONARY ARTERY BYPASS GRAFT): ICD-10-CM

## 2021-10-13 DIAGNOSIS — I25.5 ISCHEMIC CARDIOMYOPATHY: ICD-10-CM

## 2021-10-13 DIAGNOSIS — I50.43 ACUTE ON CHRONIC COMBINED SYSTOLIC (CONGESTIVE) AND DIASTOLIC (CONGESTIVE) HEART FAILURE (H): ICD-10-CM

## 2021-10-13 LAB
ANION GAP SERPL CALCULATED.3IONS-SCNC: 4 MMOL/L (ref 3–14)
BUN SERPL-MCNC: 16 MG/DL (ref 7–30)
CALCIUM SERPL-MCNC: 8.7 MG/DL (ref 8.5–10.1)
CHLORIDE BLD-SCNC: 107 MMOL/L (ref 94–109)
CO2 SERPL-SCNC: 26 MMOL/L (ref 20–32)
CREAT SERPL-MCNC: 1.37 MG/DL (ref 0.66–1.25)
GFR SERPL CREATININE-BSD FRML MDRD: 50 ML/MIN/1.73M2
GLUCOSE BLD-MCNC: 163 MG/DL (ref 70–99)
NT-PROBNP SERPL-MCNC: 2122 PG/ML (ref 0–450)
POTASSIUM BLD-SCNC: 4.9 MMOL/L (ref 3.4–5.3)
SODIUM SERPL-SCNC: 137 MMOL/L (ref 133–144)

## 2021-10-13 PROCEDURE — 36415 COLL VENOUS BLD VENIPUNCTURE: CPT | Performed by: PHYSICIAN ASSISTANT

## 2021-10-13 PROCEDURE — 80048 BASIC METABOLIC PNL TOTAL CA: CPT | Performed by: PHYSICIAN ASSISTANT

## 2021-10-13 PROCEDURE — 83880 ASSAY OF NATRIURETIC PEPTIDE: CPT | Performed by: PHYSICIAN ASSISTANT

## 2021-10-13 PROCEDURE — 99214 OFFICE O/P EST MOD 30 MIN: CPT | Performed by: PHYSICIAN ASSISTANT

## 2021-10-13 RX ORDER — METOPROLOL SUCCINATE 25 MG/1
TABLET, EXTENDED RELEASE ORAL
Qty: 360 TABLET | Refills: 3 | Status: SHIPPED | OUTPATIENT
Start: 2021-10-13 | End: 2021-12-09

## 2021-10-13 RX ORDER — SPIRONOLACTONE 25 MG/1
25 TABLET ORAL DAILY
Qty: 90 TABLET | Refills: 3 | Status: SHIPPED | OUTPATIENT
Start: 2021-10-13 | End: 2022-05-17

## 2021-10-13 ASSESSMENT — MIFFLIN-ST. JEOR: SCORE: 1415.29
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
## 2021-10-13 NOTE — PATIENT INSTRUCTIONS
Call [facility] nurse for any questions or concerns Mon-Fri 8am-4pm:                                                 #[phone]                                       For concerns after hours:                                               #[phone]         Plan from today:   After adding spironolactone 25 mg, the labs look good/stable.     Increase the Toprol XL.  You are currently taking 1.5 tablets (37.5 mg) twice daily.  You can increase either the morning or evening dose to 2 tabs (50 mg), then if you are tolerating that increase, you can increase the second dose to 2 tabs (50 mg) as well.  Ultimately, we want you on 50 mg twice daily.        Lab results: see attached:   Component      Latest Ref Rng & Units 9/22/2021 10/13/2021   Sodium      133 - 144 mmol/L 142 137   Potassium      3.4 - 5.3 mmol/L 4.8 4.9   Chloride      94 - 109 mmol/L 108 107   Carbon Dioxide      20 - 32 mmol/L 26 26   Anion Gap      3 - 14 mmol/L 8 4   Urea Nitrogen      7 - 30 mg/dL 14 16   Creatinine      0.66 - 1.25 mg/dL 1.48 (H) 1.37 (H)   Calcium      8.5 - 10.1 mg/dL 9.1 8.7   Glucose      70 - 99 mg/dL 177 (H) 163 (H)   GFR Estimate      >60 mL/min/1.73m2 45 (L) 50 (L)   N-Terminal Pro Bnp      0 - 450 pg/mL 3,402 (H) 2,122 (H)

## 2021-10-15 ENCOUNTER — CARE COORDINATION (OUTPATIENT)
Dept: CARDIOLOGY | Facility: CLINIC | Age: 76
End: 2021-10-15

## 2021-10-15 NOTE — PROGRESS NOTES
Message left for Adal to see how he is feeling after med changes.     Chanel Dow, RN 4:06 PM 10/15/21

## 2021-10-22 ENCOUNTER — OFFICE VISIT (OUTPATIENT)
Dept: INTERNAL MEDICINE | Facility: CLINIC | Age: 76
End: 2021-10-22
Payer: COMMERCIAL

## 2021-10-22 VITALS
DIASTOLIC BLOOD PRESSURE: 70 MMHG | HEIGHT: 68 IN | OXYGEN SATURATION: 10 % | HEART RATE: 67 BPM | WEIGHT: 153 LBS | RESPIRATION RATE: 16 BRPM | TEMPERATURE: 97.5 F | BODY MASS INDEX: 23.19 KG/M2 | SYSTOLIC BLOOD PRESSURE: 126 MMHG

## 2021-10-22 DIAGNOSIS — I25.5 ISCHEMIC CARDIOMYOPATHY: ICD-10-CM

## 2021-10-22 DIAGNOSIS — Z12.5 SCREENING FOR PROSTATE CANCER: ICD-10-CM

## 2021-10-22 DIAGNOSIS — E78.5 HYPERLIPIDEMIA WITH TARGET LDL LESS THAN 70: ICD-10-CM

## 2021-10-22 DIAGNOSIS — Z23 NEED FOR PROPHYLACTIC VACCINATION AND INOCULATION AGAINST INFLUENZA: ICD-10-CM

## 2021-10-22 DIAGNOSIS — N18.31 STAGE 3A CHRONIC KIDNEY DISEASE (H): ICD-10-CM

## 2021-10-22 DIAGNOSIS — I10 HYPERTENSION GOAL BP (BLOOD PRESSURE) < 140/90: ICD-10-CM

## 2021-10-22 DIAGNOSIS — E11.69 TYPE 2 DIABETES MELLITUS WITH OTHER SPECIFIED COMPLICATION, WITHOUT LONG-TERM CURRENT USE OF INSULIN (H): ICD-10-CM

## 2021-10-22 DIAGNOSIS — Z00.00 ENCOUNTER FOR PREVENTATIVE ADULT HEALTH CARE EXAMINATION: Primary | ICD-10-CM

## 2021-10-22 LAB
ALBUMIN UR-MCNC: NEGATIVE MG/DL
APPEARANCE UR: CLEAR
BILIRUB UR QL STRIP: NEGATIVE
COLOR UR AUTO: YELLOW
GLUCOSE UR STRIP-MCNC: NEGATIVE MG/DL
HGB UR QL STRIP: ABNORMAL
KETONES UR STRIP-MCNC: NEGATIVE MG/DL
LEUKOCYTE ESTERASE UR QL STRIP: NEGATIVE
NITRATE UR QL: NEGATIVE
PH UR STRIP: 5.5 [PH] (ref 5–7)
RBC #/AREA URNS AUTO: NORMAL /HPF
SP GR UR STRIP: 1.01 (ref 1–1.03)
UROBILINOGEN UR STRIP-ACNC: 0.2 E.U./DL
WBC #/AREA URNS AUTO: NORMAL /HPF

## 2021-10-22 PROCEDURE — 84443 ASSAY THYROID STIM HORMONE: CPT | Performed by: INTERNAL MEDICINE

## 2021-10-22 PROCEDURE — 36415 COLL VENOUS BLD VENIPUNCTURE: CPT | Performed by: INTERNAL MEDICINE

## 2021-10-22 PROCEDURE — 99213 OFFICE O/P EST LOW 20 MIN: CPT | Mod: 25 | Performed by: INTERNAL MEDICINE

## 2021-10-22 PROCEDURE — 82043 UR ALBUMIN QUANTITATIVE: CPT | Performed by: INTERNAL MEDICINE

## 2021-10-22 PROCEDURE — G0103 PSA SCREENING: HCPCS | Performed by: INTERNAL MEDICINE

## 2021-10-22 PROCEDURE — 90662 IIV NO PRSV INCREASED AG IM: CPT | Performed by: INTERNAL MEDICINE

## 2021-10-22 PROCEDURE — 81001 URINALYSIS AUTO W/SCOPE: CPT | Performed by: INTERNAL MEDICINE

## 2021-10-22 PROCEDURE — G0008 ADMIN INFLUENZA VIRUS VAC: HCPCS | Performed by: INTERNAL MEDICINE

## 2021-10-22 PROCEDURE — 80061 LIPID PANEL: CPT | Performed by: INTERNAL MEDICINE

## 2021-10-22 PROCEDURE — 99397 PER PM REEVAL EST PAT 65+ YR: CPT | Mod: 25 | Performed by: INTERNAL MEDICINE

## 2021-10-22 RX ORDER — ATORVASTATIN CALCIUM 40 MG/1
40 TABLET, FILM COATED ORAL DAILY
Qty: 90 TABLET | Refills: 3 | Status: SHIPPED | OUTPATIENT
Start: 2021-10-22 | End: 2021-11-30

## 2021-10-22 ASSESSMENT — ACTIVITIES OF DAILY LIVING (ADL): CURRENT_FUNCTION: NO ASSISTANCE NEEDED

## 2021-10-22 ASSESSMENT — ENCOUNTER SYMPTOMS
HEARTBURN: 0
JOINT SWELLING: 0
PARESTHESIAS: 0
HEADACHES: 0
SORE THROAT: 0
CONSTIPATION: 0
NERVOUS/ANXIOUS: 0
CHILLS: 0
MYALGIAS: 0
DIZZINESS: 0
EYE PAIN: 0
DIARRHEA: 0
FEVER: 0
COUGH: 0
DYSURIA: 0
PALPITATIONS: 0
FREQUENCY: 0
HEMATURIA: 0
ARTHRALGIAS: 0
ABDOMINAL PAIN: 0
WEAKNESS: 0
NAUSEA: 0
HEMATOCHEZIA: 0
SHORTNESS OF BREATH: 0

## 2021-10-22 ASSESSMENT — MIFFLIN-ST. JEOR: SCORE: 1398.5

## 2021-10-22 NOTE — PROGRESS NOTES
"SUBJECTIVE:   Adal Bates is a 75 year old male who presents for Preventive Visit.      Patient has been advised of split billing requirements and indicates understanding: Yes   Are you in the first 12 months of your Medicare coverage?  No    Healthy Habits:     In general, how would you rate your overall health?  Good    Frequency of exercise:  4-5 days/week    Duration of exercise:  15-30 minutes    Do you usually eat at least 4 servings of fruit and vegetables a day, include whole grains    & fiber and avoid regularly eating high fat or \"junk\" foods?  Yes    Medication side effects:  None    Ability to successfully perform activities of daily living:  No assistance needed    Home Safety:  No safety concerns identified    Hearing Impairment:  No hearing concerns    In the past 6 months, have you been bothered by leaking of urine?  No    In general, how would you rate your overall mental or emotional health?  Good      PHQ-2 Total Score: 0    Additional concerns today:  No    Do you feel safe in your environment? Yes    Have you ever done Advance Care Planning? (For example, a Health Directive, POLST, or a discussion with a medical provider or your loved ones about your wishes): Yes, advance care planning is on file.       Fall risk  Fallen 2 or more times in the past year?: No  Any fall with injury in the past year?: No    Cognitive Screening   1) Repeat 3 items (Leader, Season, Table)    2) Clock draw:   NORMAL  3) 3 item recall: Recalls NO objects   Results: 0 items recalled: PROBABLE COGNITIVE IMPAIRMENT, **INFORM PROVIDER**    Mini-CogTM Copyright DONTA Tracy. Licensed by the author for use in [facility]; reprinted with permission (jose c@.[email or web address]). All rights reserved.      Do you have sleep apnea, excessive snoring or daytime drowsiness?: no    Reviewed and updated as needed this visit by clinical staff  Tobacco  Allergies  Meds  Problems  Med Hx  Surg Hx  Fam Hx  Soc Hx          Reviewed " and updated as needed this visit by Provider  Tobacco  Allergies  Meds  Problems  Med Hx  Surg Hx  Fam Hx         Social History     Tobacco Use     Smoking status: Never Smoker     Smokeless tobacco: Never Used   Substance Use Topics     Alcohol use: Yes     Alcohol/week: 0.0 standard drinks     Comment: social         Alcohol Use 10/22/2021   Prescreen: >3 drinks/day or >7 drinks/week? No   Prescreen: >3 drinks/day or >7 drinks/week? -           PROBLEMS TO ADD ON...  Has H/O DM. On diet , exercise and oral treatment. Blood sugars are controlled. No parestesias. No hypoglycemias.  Has H/O hyperlipidemia. On medical treatment and diet. No side effects. No muscle weakness, myalgias or upset stomach.   Has h/o CHF, follows with cardiology. Has h/o ischemic heart disease, asymptomatic regarding chest pains, SOB,palpitations. Has good compliance with treatment, diet and exercise.  Has h/o CRF. Monitoring BP, BG, medications, avoiding OTC NSAIDs. Needs periodic recheck of kidney function.      Current providers sharing in care for this patient include:   Patient Care Team:  Alvaro Lindo MD as PCP - General (Internal Medicine)  Alvaro Lindo MD as Assigned PCP  Hector Hall MD as MD (Cardiology)  Rimma Keyes PA-C as Assigned Surgical Provider  Maryann Galvan PA-C as Assigned Heart and Vascular Provider    The following health maintenance items are reviewed in Epic and correct as of today:  Health Maintenance Due   Topic Date Due     HF ACTION PLAN  Never done     ANNUAL REVIEW OF HM ORDERS  Never done     HEPATITIS C SCREENING  Never done     ZOSTER IMMUNIZATION (2 of 3) 08/11/2009     EYE EXAM  07/01/2020     Pneumococcal Vaccine: 65+ Years (2 of 2 - PPSV23) 02/22/2021     INFLUENZA VACCINE (1) 09/01/2021     LIPID  10/12/2021     Lab work is in process          Review of Systems   Constitutional: Negative for chills and fever.   HENT: Negative for congestion, ear pain,  "hearing loss and sore throat.    Eyes: Negative for pain and visual disturbance.   Respiratory: Negative for cough and shortness of breath.    Cardiovascular: Negative for chest pain, palpitations and peripheral edema.   Gastrointestinal: Negative for abdominal pain, constipation, diarrhea, heartburn, hematochezia and nausea.   Genitourinary: Negative for discharge, dysuria, frequency, genital sores, hematuria, impotence and urgency.   Musculoskeletal: Negative for arthralgias, joint swelling and myalgias.   Skin: Negative for rash.   Neurological: Negative for dizziness, weakness, headaches and paresthesias.   Psychiatric/Behavioral: Negative for mood changes. The patient is not nervous/anxious.          OBJECTIVE:   /70   Pulse 67   Temp 97.5  F (36.4  C) (Oral)   Resp 16   Ht 1.727 m (5' 8\")   Wt 69.4 kg (153 lb)   SpO2 (!) 10%   BMI 23.26 kg/m   Estimated body mass index is 23.26 kg/m  as calculated from the following:    Height as of this encounter: 1.727 m (5' 8\").    Weight as of this encounter: 69.4 kg (153 lb).  Physical Exam  GENERAL: healthy, alert and no distress  EYES: Eyes grossly normal to inspection, PERRL and conjunctivae and sclerae normal  HENT: ear canals and TM's normal, nose and mouth without ulcers or lesions  NECK: no adenopathy, no asymmetry, masses, or scars and thyroid normal to palpation  RESP: lungs clear to auscultation - no rales, rhonchi or wheezes  CV: regular rate and rhythm, normal S1 S2, no S3 or S4, no murmur, click or rub, no peripheral edema and peripheral pulses strong  ABDOMEN: soft, nontender, no hepatosplenomegaly, no masses and bowel sounds normal  MS: no gross musculoskeletal defects noted, no edema  SKIN: no suspicious lesions or rashes  NEURO: Normal strength and tone, mentation intact and speech normal  PSYCH: mentation appears normal, affect normal/bright    Diagnostic Test Results:  Labs reviewed in Epic    ASSESSMENT / PLAN:       ICD-10-CM    1. " "Encounter for preventative adult health care examination  Z00.00 Lipid panel reflex to direct LDL Fasting     PSA, screen     TSH with free T4 reflex     Albumin Random Urine Quantitative with Creat Ratio     UA with Microscopic reflex to Culture - lab collect     Urine Microscopic   2. Hyperlipidemia with target LDL less than 70  E78.5 Lipid panel reflex to direct LDL Fasting     OFFICE/OUTPT VISIT,EST,LEVL III   3. Type 2 diabetes mellitus with other specified complication, without long-term current use of insulin (H)  E11.69 empagliflozin (JARDIANCE) 10 MG TABS tablet     blood glucose (NO BRAND SPECIFIED) test strip     OFFICE/OUTPT VISIT,EST,LEVL III   4. Ischemic cardiomyopathy  I25.5 atorvastatin (LIPITOR) 40 MG tablet     TSH with free T4 reflex     OFFICE/OUTPT VISIT,EST,LEVL III   5. Stage 3a chronic kidney disease (H)  N18.31 Albumin Random Urine Quantitative with Creat Ratio     UA with Microscopic reflex to Culture - lab collect     Urine Microscopic     OFFICE/OUTPT VISIT,EST,LEVL III   6. Screening for prostate cancer  Z12.5 PSA, screen   7. Hypertension goal BP (blood pressure) < 140/90  I10 TSH with free T4 reflex     OFFICE/OUTPT VISIT,EST,LEVL III     Assess lab  Start on Jardiance, has h/o CHF , will benefit for his DM management,   Stop Glipizide to avoid hypoglycemias   Recheck in 3 months A1C and BMP     Patient has been advised of split billing requirements and indicates understanding: Yes  COUNSELING:  Reviewed preventive health counseling, as reflected in patient instructions       Regular exercise       Healthy diet/nutrition       Vision screening       Colon cancer screening       Prostate cancer screening    Estimated body mass index is 23.26 kg/m  as calculated from the following:    Height as of this encounter: 1.727 m (5' 8\").    Weight as of this encounter: 69.4 kg (153 lb).        He reports that he has never smoked. He has never used smokeless tobacco.      Appropriate preventive " services were discussed with this patient, including applicable screening as appropriate for cardiovascular disease, diabetes, osteopenia/osteoporosis, and glaucoma.  As appropriate for age/gender, discussed screening for colorectal cancer, prostate cancer, breast cancer, and cervical cancer. Checklist reviewing preventive services available has been given to the patient.    Reviewed patients plan of care and provided an AVS. The Intermediate Care Plan ( asthma action plan, low back pain action plan, and migraine action plan) for Adal meets the Care Plan requirement. This Care Plan has been established and reviewed with the Patient and spouse.    Counseling Resources:  ATP IV Guidelines  Pooled Cohorts Equation Calculator  Breast Cancer Risk Calculator  Breast Cancer: Medication to Reduce Risk  FRAX Risk Assessment  ICSI Preventive Guidelines  Dietary Guidelines for Americans, 2010  USDA's MyPlate  ASA Prophylaxis  Lung CA Screening    Alvaro Lindo MD  [facility]    Identified Health Risks:

## 2021-10-22 NOTE — LETTER
[place] [address], [address]  [place] [address]  [phone]        November 1, 2021    Adal Bates  [address] [facility] [address]            Dear Aleena PalmaAdal V Bates:    Your labs show Low HDL cholesterol. Keep diet and exercise   Slightly elevated albumin in the urine. Continue current treatment, will monitor in 6 months.     If you have any further questions or problems, please contact our office.    Sincerely,        Alvaro Lindo M.D.    Results for orders placed or performed in visit on 10/22/21   Lipid panel reflex to direct LDL Fasting     Status: Abnormal   Result Value Ref Range    Cholesterol 99 <200 mg/dL    Triglycerides 67 <150 mg/dL    Direct Measure HDL 33 (L) >=40 mg/dL    LDL Cholesterol Calculated 53 <=100 mg/dL    Non HDL Cholesterol 66 <130 mg/dL    Patient Fasting > 8hrs? Yes     Narrative    Cholesterol  Desirable:  <200 mg/dL    Triglycerides  Normal:  Less than 150 mg/dL  Borderline High:  150-199 mg/dL  High:  200-499 mg/dL  Very High:  Greater than or equal to 500 mg/dL    Direct Measure HDL  Female:  Greater than or equal to 50 mg/dL   Male:  Greater than or equal to 40 mg/dL    LDL Cholesterol  Desirable:  <100mg/dL  Above Desirable:  100-129 mg/dL   Borderline High:  130-159 mg/dL   High:  160-189 mg/dL   Very High:  >= 190 mg/dL    Non HDL Cholesterol  Desirable:  130 mg/dL  Above Desirable:  130-159 mg/dL  Borderline High:  160-189 mg/dL  High:  190-219 mg/dL  Very High:  Greater than or equal to 220 mg/dL   PSA, screen     Status: Normal   Result Value Ref Range    Prostate Specific Antigen Screen 0.77 0.00 - 4.00 ug/L   TSH with free T4 reflex     Status: Normal   Result Value Ref Range    TSH 2.10 0.40 - 4.00 mU/L   Albumin Random Urine Quantitative with Creat Ratio     Status: Abnormal   Result Value Ref Range    Creatinine Urine mg/dL 41 mg/dL    Albumin Urine mg/L 120 mg/L    Albumin Urine mg/g Cr 292.68 (H) 0.00 - 17.00  mg/g Cr   UA with Microscopic reflex to Culture - lab collect     Status: Abnormal    Specimen: Urine, Midstream   Result Value Ref Range    Color Urine Yellow Colorless, Straw, Light Yellow, Yellow    Appearance Urine Clear Clear    Glucose Urine Negative Negative mg/dL    Bilirubin Urine Negative Negative    Ketones Urine Negative Negative mg/dL    Specific Gravity Urine 1.015 1.003 - 1.035    Blood Urine Trace (A) Negative    pH Urine 5.5 5.0 - 7.0    Protein Albumin Urine Negative Negative mg/dL    Urobilinogen Urine 0.2 0.2, 1.0 E.U./dL    Nitrite Urine Negative Negative    Leukocyte Esterase Urine Negative Negative   Urine Microscopic     Status: Normal   Result Value Ref Range    RBC Urine 0-2 0-2 /HPF /HPF    WBC Urine 0-5 0-5 /HPF /HPF    Narrative    Urine Culture not indicated

## 2021-10-24 LAB
CHOLEST SERPL-MCNC: 99 MG/DL
CREAT UR-MCNC: 41 MG/DL
FASTING STATUS PATIENT QL REPORTED: YES
HDLC SERPL-MCNC: 33 MG/DL
LDLC SERPL CALC-MCNC: 53 MG/DL
MICROALBUMIN UR-MCNC: 120 MG/L
MICROALBUMIN/CREAT UR: 292.68 MG/G CR (ref 0–17)
NONHDLC SERPL-MCNC: 66 MG/DL
PSA SERPL-MCNC: 0.77 UG/L (ref 0–4)
TRIGL SERPL-MCNC: 67 MG/DL
TSH SERPL DL<=0.005 MIU/L-ACNC: 2.1 MU/L (ref 0.4–4)

## 2021-11-08 ENCOUNTER — LAB (OUTPATIENT)
Dept: LAB | Facility: CLINIC | Age: 76
End: 2021-11-08
Payer: COMMERCIAL

## 2021-11-08 ENCOUNTER — HOSPITAL ENCOUNTER (OUTPATIENT)
Dept: CARDIOLOGY | Facility: CLINIC | Age: 76
Discharge: HOME | End: 2021-11-08
Attending: INTERNAL MEDICINE | Admitting: INTERNAL MEDICINE
Payer: COMMERCIAL

## 2021-11-08 DIAGNOSIS — I50.22 CHRONIC SYSTOLIC HEART FAILURE (H): ICD-10-CM

## 2021-11-08 LAB
ANION GAP SERPL CALCULATED.3IONS-SCNC: 8 MMOL/L (ref 3–14)
BUN SERPL-MCNC: 24 MG/DL (ref 7–30)
CALCIUM SERPL-MCNC: 9 MG/DL (ref 8.5–10.1)
CHLORIDE BLD-SCNC: 108 MMOL/L (ref 94–109)
CO2 SERPL-SCNC: 24 MMOL/L (ref 20–32)
CREAT SERPL-MCNC: 1.63 MG/DL (ref 0.66–1.25)
GFR SERPL CREATININE-BSD FRML MDRD: 40 ML/MIN/1.73M2
GLUCOSE BLD-MCNC: 243 MG/DL (ref 70–99)
POTASSIUM BLD-SCNC: 4.2 MMOL/L (ref 3.4–5.3)
SODIUM SERPL-SCNC: 140 MMOL/L (ref 133–144)

## 2021-11-08 PROCEDURE — 93306 TTE W/DOPPLER COMPLETE: CPT | Mod: 26 | Performed by: INTERNAL MEDICINE

## 2021-11-08 PROCEDURE — 80048 BASIC METABOLIC PNL TOTAL CA: CPT | Performed by: INTERNAL MEDICINE

## 2021-11-08 PROCEDURE — 255N000002 HC RX 255 OP 636: Performed by: INTERNAL MEDICINE

## 2021-11-08 PROCEDURE — 999N000208 ECHOCARDIOGRAM COMPLETE

## 2021-11-08 RX ADMIN — HUMAN ALBUMIN MICROSPHERES AND PERFLUTREN 3 ML: 10; .22 INJECTION, SOLUTION INTRAVENOUS at 09:02

## 2021-11-09 ENCOUNTER — CARE COORDINATION (OUTPATIENT)
Dept: CARDIOLOGY | Facility: CLINIC | Age: 76
End: 2021-11-09

## 2021-11-09 ENCOUNTER — ALLIED HEALTH/NURSE VISIT (OUTPATIENT)
Dept: CARDIOLOGY | Facility: CLINIC | Age: 76
End: 2021-11-09
Payer: COMMERCIAL

## 2021-11-09 DIAGNOSIS — I48.91 ATRIAL FIBRILLATION, UNSPECIFIED TYPE (H): ICD-10-CM

## 2021-11-09 DIAGNOSIS — I48.91 ATRIAL FIBRILLATION (H): Primary | ICD-10-CM

## 2021-11-09 PROCEDURE — 93000 ELECTROCARDIOGRAM COMPLETE: CPT | Performed by: INTERNAL MEDICINE

## 2021-11-09 NOTE — PROGRESS NOTES
reviewed echo results. Possible afib noted at time of echo 11/8/21.    Flavio Garcia MD Sletteboe, Erin B., RN  Can you have him ideally come into the clinic and get an ecg. It is hard to say on echo if it is truly AF. If he can come in today, that is great, other wise get ecg when he sees me on 12th.       I called pt and his wife answered. I let her know  would like pt to have EKG today if possible. Pt scheduled for 2:15 EKG appt in  today. Hyun VAN November 9, 2021, 11:27 AM

## 2021-11-09 NOTE — PROGRESS NOTES
Flavio Garcia MD Sletteboe, Erin B., RN  He is in sinus rhythm, thank you. Will discuss further in clinic. Order a 14-day zio so that he can get it the day he comes to clinic.       I left message for pt to let him know he was in NSR for EKG today, but  wants him to wear a heart monitor for a longer period of time to rule out afib. I will message scheduling to call pt tomorrow to set up ziopatch for day of visit with . Hyun VAN November 9, 2021, 5:28 PM

## 2021-11-12 ENCOUNTER — OFFICE VISIT (OUTPATIENT)
Dept: CARDIOLOGY | Facility: CLINIC | Age: 76
End: 2021-11-12
Attending: INTERNAL MEDICINE
Payer: COMMERCIAL

## 2021-11-12 ENCOUNTER — HOSPITAL ENCOUNTER (OUTPATIENT)
Dept: CARDIOLOGY | Facility: CLINIC | Age: 76
Discharge: HOME | End: 2021-11-12
Attending: INTERNAL MEDICINE | Admitting: INTERNAL MEDICINE
Payer: COMMERCIAL

## 2021-11-12 VITALS
SYSTOLIC BLOOD PRESSURE: 131 MMHG | DIASTOLIC BLOOD PRESSURE: 82 MMHG | WEIGHT: 156 LBS | BODY MASS INDEX: 23.64 KG/M2 | HEIGHT: 68 IN | HEART RATE: 65 BPM

## 2021-11-12 DIAGNOSIS — I48.91 ATRIAL FIBRILLATION (H): ICD-10-CM

## 2021-11-12 DIAGNOSIS — I25.5 ISCHEMIC CARDIOMYOPATHY: ICD-10-CM

## 2021-11-12 PROCEDURE — 93246 EXT ECG>7D<15D RECORDING: CPT

## 2021-11-12 PROCEDURE — 93248 EXT ECG>7D<15D REV&INTERPJ: CPT | Performed by: INTERNAL MEDICINE

## 2021-11-12 PROCEDURE — 99215 OFFICE O/P EST HI 40 MIN: CPT | Performed by: INTERNAL MEDICINE

## 2021-11-12 ASSESSMENT — MIFFLIN-ST. JEOR: SCORE: 1407.11

## 2021-11-12 NOTE — PATIENT INSTRUCTIONS
1. Increase Entresto to 2 tablets in the am and 1 in the PM and after 2 weeks, increase to 2 tablets am and pm. Call if low BP or lightheaded/ dizzy.   2. See cardiac EP for CRT-D consultation  3. See Heart failure [facility] in 3 months and 6 months Dr. Garcia with echocardiogram
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
## 2021-11-12 NOTE — PROGRESS NOTES
Service Date: 11/12/2021    REASON FOR VISIT: [facility] heart [facility] consultation.    HISTORY OF PRESENT ILLNESS: Adal Bates is a very pleasant 76-year-old gentleman who first saw me in 09/2021.  The patient is here today with his wife.  The patient's son is a nephrologist in [place], who was on the phone today and I had a long conversation with him as well.    The patient saw me for a new heart failure consultation is 09/2021.  The patient has a history of longstanding left bundle branch block for many years.  In 2016, he was diagnosed with severe ischemic cardiomyopathy.  Coronary angiography at that time revealed multivessel coronary disease for which he underwent 5-vessel bypass surgery.  MRI at that time had shown significant amount of viability.  EF at that time was 25%.  Subsequently, in 2018, it improved to about 30%-35%.  He was lost to follow up for many years and then eventually reestablished care with us in 09/2021 when he saw me.      At that time, he had decline in his LV function.  EF was again down to 20% with worsening LV remodeling.  I elected to do a nuclear stress test, which showed anterior infarction without ischemia.  He also was enrolled in [facility] and medication changes were made.  Currently, now he is on 25 mg b.i.d. of metoprolol succinate.  He was changed to Entresto and is currently taking level two 49/51 mg b.i.d. dosing.  Along with that, he is on 25 mg of spironolactone and 10 mg daily of the Jardiance.  He did not have any diuretic requirements.  He is NYHA class II heart failure symptoms.  No angina, syncope, presyncope reported.    Despite guideline directed medical therapy with 4-drug regimen, his LV function on 11/08/2021 did not improve.  LV function remained at 20%.  I personally reviewed the echo images.  There is evidence of LV dyssynchrony due to his wide left bundle branch block.  Indeed, his QRS complexes are 162 milliseconds on his 12-lead  electrocardiogram.    Today, Adal is here for routine followup.  Denies any new symptoms.    PHYSICAL EXAMINATION:    VITAL SIGNS:  Blood pressure is 131/82.  He says his home blood pressures do run in the same 130/80 category at home.  He has not had low blood pressures at home.  GENERAL:  Alert, oriented x3, in no acute distress.  NECK:  Supple.  JVP normal.  LUNGS:  Clear.    CARDIOVASCULAR: Cardiac sounds are regular, soft systolic murmur best heard in the right parasternal border and a little in the axilla.  EXTREMITIES:  Warm without edema.  PSYCHIATRIC:  Appropriate affect.  HEENT:  No icterus or pallor.    CURRENT MEDICATIONS:    1.  Aspirin 81 mg daily.  2.  Atorvastatin 40 mg daily.  3.  Jardiance 10 mg daily.  4.  Finasteride 5 mg daily.  5.  Metformin.  6.  Metoprolol succinate 25 mg b.i.d.   7.  Sacubitril/valsartan 49/51 mg b.i.d.  8.  Spironolactone 25 mg daily.    PERTINENT DATA:  Creatinine 1.6, potassium 4.2.  GFR is 40.  Last hemoglobin A1c 6.5.  ECG, normal sinus rhythm with left bundle branch block, QRS measuring 162 milliseconds.  Last echocardiography showing mild LV dilatation with severely reduced LV function at 20% with LV dyssynchronous contraction pattern.  Last nuclear study showing anterior nontransmural infarction without ischemia.    ASSESSMENT AND PLAN: Adal is 76 and NYHA class II.  EF is not improving at this time.  However, there is some room to improve further heart failure therapy.  I told him to increase the Entresto dosing.  He will increase it to 2 tablets in the morning and 1 tablet in the evening of level two dosing.  After 2 weeks if blood pressure is still stable and he is not symptomatic with low blood pressures, I would recommend increasing to full dose that is 2 tablets b.i.d.  Once he runs out of those medications, increase it to the level three dosing with a new strength prescription.    Given his left bundle branch block and lack of ischemia on the nuclear  study, I do not think we are going to get much EF improvement here.  I recommend proceeding with CRT implantation.  I had a long discussion with the patient and explained to them data behind the  trial.  I explained to him a rationale behind CRT and paucity of data behind CRT-D versus CRT-P.  I am going to have him see Cardiac EP and they will have further discussions on whether he should undergo a defibrillator implantation along with a CRT or not.  Given his age of 76, I think he is going to have less overall net clinical benefit by putting in a CRT-D, but he will think about it.  His son was also in agreement that they very likely should proceed with a CRT-P, but again they are going to discuss all of this over the next 2 weeks.    Labs in 2 weeks to ensure creatinine stability on increased Entresto dosing.  See [facility] KAYLA in 3 months and see me in clinic in 6 months with an echocardiogram.      I have explained to him the risks and benefits of CRT.  I have also told him that there is a relatively higher nonresponder rate with his ischemic heart disease, and there is a possibility his EF may not improve with time.      See us sooner if anything changes.  Otherwise, I will connect with him in 6 months.    cc:    Alvaro Lindo MD  [facility]  [address]  [place] [address]     Flavio Garcia MD        D: 2021   T: 2021   MT: PEYTON    Name:     OLEG SANCHEZ  MRN:      [id number]        Account:      [id number]   :      1945           Service Date: 2021       Document: [id number]
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
## 2021-11-15 LAB — BI-PLANE LVEF ECHO: NORMAL

## 2021-11-23 DIAGNOSIS — E11.8 TYPE 2 DIABETES MELLITUS WITH COMPLICATION, WITHOUT LONG-TERM CURRENT USE OF INSULIN (H): ICD-10-CM

## 2021-11-23 RX ORDER — METFORMIN HCL 500 MG
TABLET, EXTENDED RELEASE 24 HR ORAL
Qty: 360 TABLET | Refills: 0 | Status: SHIPPED | OUTPATIENT
Start: 2021-11-23 | End: 2022-02-16

## 2021-11-23 NOTE — TELEPHONE ENCOUNTER
Patient calling for refill of Metformin. [facility] will not allow me to request refill.    Dosage was confirmed and pls send to [facility] in [place]. Patient will be out of med before the Holiday.

## 2021-11-26 ENCOUNTER — LAB (OUTPATIENT)
Dept: LAB | Facility: CLINIC | Age: 76
End: 2021-11-26
Payer: COMMERCIAL

## 2021-11-26 DIAGNOSIS — I25.5 ISCHEMIC CARDIOMYOPATHY: ICD-10-CM

## 2021-11-26 PROCEDURE — 36415 COLL VENOUS BLD VENIPUNCTURE: CPT

## 2021-11-26 PROCEDURE — 80048 BASIC METABOLIC PNL TOTAL CA: CPT

## 2021-11-26 NOTE — TELEPHONE ENCOUNTER
The wife walked into the clinic today asking for this medication refill;  atorvastatin (LIPITOR) 40 MG tablet    Pharmacy is; [facility]  The pt only has a few days left

## 2021-11-27 LAB
ANION GAP SERPL CALCULATED.3IONS-SCNC: 3 MMOL/L (ref 3–14)
BUN SERPL-MCNC: 24 MG/DL (ref 7–30)
CALCIUM SERPL-MCNC: 9 MG/DL (ref 8.5–10.1)
CHLORIDE BLD-SCNC: 106 MMOL/L (ref 94–109)
CO2 SERPL-SCNC: 29 MMOL/L (ref 20–32)
CREAT SERPL-MCNC: 1.61 MG/DL (ref 0.66–1.25)
GFR SERPL CREATININE-BSD FRML MDRD: 41 ML/MIN/1.73M2
GLUCOSE BLD-MCNC: 218 MG/DL (ref 70–99)
POTASSIUM BLD-SCNC: 4.5 MMOL/L (ref 3.4–5.3)
SODIUM SERPL-SCNC: 138 MMOL/L (ref 133–144)

## 2021-11-29 ENCOUNTER — CARE COORDINATION (OUTPATIENT)
Dept: CARDIOLOGY | Facility: CLINIC | Age: 76
End: 2021-11-29
Payer: COMMERCIAL

## 2021-11-29 DIAGNOSIS — I25.5 ISCHEMIC CARDIOMYOPATHY: Primary | ICD-10-CM

## 2021-11-29 DIAGNOSIS — I50.22 CHRONIC SYSTOLIC HEART FAILURE (H): ICD-10-CM

## 2021-11-29 NOTE — PROGRESS NOTES
BMP results from 11/29/21 noted. I left message for pt to let him know renal function is stable. I requested that pt call back with an update on which entresto dose he is taking and what his BPs have been running. Hyun VAN November 29, 2021, 2:24 PM      Component      Latest Ref Rng & Units 11/8/2021 11/26/2021   Sodium      133 - 144 mmol/L 140 138   Potassium      3.4 - 5.3 mmol/L 4.2 4.5   Chloride      94 - 109 mmol/L 108 106   Carbon Dioxide      20 - 32 mmol/L 24 29   Anion Gap      3 - 14 mmol/L 8 3   Urea Nitrogen      7 - 30 mg/dL 24 24   Creatinine      0.66 - 1.25 mg/dL 1.63 (H) 1.61 (H)   Calcium      8.5 - 10.1 mg/dL 9.0 9.0   Glucose      70 - 99 mg/dL 243 (H) 218 (H)   GFR Estimate      >60 mL/min/1.73m2 40 (L) 41 (L)

## 2021-11-30 RX ORDER — ATORVASTATIN CALCIUM 40 MG/1
TABLET, FILM COATED ORAL
Qty: 90 TABLET | Refills: 0 | Status: SHIPPED | OUTPATIENT
Start: 2021-11-30 | End: 2022-08-22

## 2021-11-30 NOTE — PROGRESS NOTES
Pt's wife called back and said that pt has been taking entresto 2 tablets in AM and 1 tablet in PM of 49/51 mg tablets. Pt's BPs have been 120s/80s. I reviewed with her that  wants pt to increase entresto to 2 tablets BID if renal functions table and BP ok on the current dosing. I will update  and clarify if pt needs another bmp lab after increasing dose to 2 tablets BID. I will also send in an rx for the 97/103 mg tablet (1 tablet BID) once I clarify with  if pt needs another bmp lab. Hyun VAN November 30, 2021, 3:43 PM

## 2021-12-01 RX ORDER — SACUBITRIL AND VALSARTAN 97; 103 MG/1; MG/1
1 TABLET, FILM COATED ORAL 2 TIMES DAILY
Qty: 180 TABLET | Refills: 3 | Status: SHIPPED | OUTPATIENT
Start: 2021-12-01 | End: 2022-10-10

## 2021-12-01 NOTE — TELEPHONE ENCOUNTER
Yes he can increase to full dose Entresto twice daily.  Would recommend creatinine check in 2 to 3 weeks.  Please alert me if abnormal.  Await decisions with EP.

## 2021-12-01 NOTE — PROGRESS NOTES
I called pt and reviewed 's recommendation to increase entresto to 97/103 mg twice daily. Pt currently has 49/51 mg tablets so he will take 2 tablets twice daily. When he gets his new prescription for 97/103 mg tablets it will be 1 tablet twice daily.    Pt said he has had occasional upset GI symptoms, and he thinks it is possible due to one of his medications. Pt also said his blood glucose has been running in the 150s since stopping glipizide and starting jardiance. Pt said he spoke to his son who is a doctor about possible side effects and he recommended he try the medications for a couple of more weeks to see if his body adjusts so pt would like to do that. He will call if symptoms worsen. I recommended he call PCP to discuss his elevated blood sugars and GI symptoms. I scheduled pt for bmp lab 12/16/21 @ 9:30 AM in . New entresto prescription for 97/103 mg twice daily [facility]. Hyun VAN December 1, 2021, 10:57 AM

## 2021-12-02 ENCOUNTER — TELEPHONE (OUTPATIENT)
Dept: INTERNAL MEDICINE | Facility: CLINIC | Age: 76
End: 2021-12-02
Payer: COMMERCIAL

## 2021-12-02 DIAGNOSIS — E11.9 TYPE 2 DIABETES, HBA1C GOAL < 8% (H): Primary | ICD-10-CM

## 2021-12-02 NOTE — TELEPHONE ENCOUNTER
Call to wife. Having diarrhea 1-2 times a day. More loose, a lot of gas. Seemed to start after the Jardiance. The Metformin has not been increased recently. Has been at least 6 months.      Reviewed medications with wife. The Metoprolol Succ was increased recently as well. This was done through Cardiology. Per Joe, Diarrhea, a) Incidence: Greater than 2%.     She is going to discuss with a family member who is also Cardiologist and get their opinion and then she may call his Cardiologist regarding his Metoprolol and if causing Diarrhea. .

## 2021-12-02 NOTE — TELEPHONE ENCOUNTER
Patient's wife is calling to ask if jardiance can cause diarrhea.  She also says since he started taking jardiance his blood sugars have been running higher in the 150's.

## 2021-12-02 NOTE — TELEPHONE ENCOUNTER
Diarrhea is not a common side effect from Jardiance. Recommend to recheck HgbA1C to assess his diabetic control.

## 2021-12-09 ENCOUNTER — OFFICE VISIT (OUTPATIENT)
Dept: CARDIOLOGY | Facility: CLINIC | Age: 76
End: 2021-12-09
Attending: INTERNAL MEDICINE
Payer: COMMERCIAL

## 2021-12-09 VITALS
HEART RATE: 60 BPM | WEIGHT: 152 LBS | SYSTOLIC BLOOD PRESSURE: 118 MMHG | BODY MASS INDEX: 23.04 KG/M2 | HEIGHT: 68 IN | DIASTOLIC BLOOD PRESSURE: 70 MMHG | OXYGEN SATURATION: 99 %

## 2021-12-09 DIAGNOSIS — Z11.59 ENCOUNTER FOR SCREENING FOR OTHER VIRAL DISEASES: ICD-10-CM

## 2021-12-09 DIAGNOSIS — Z95.1 S/P CABG (CORONARY ARTERY BYPASS GRAFT): ICD-10-CM

## 2021-12-09 DIAGNOSIS — I25.5 ISCHEMIC CARDIOMYOPATHY: ICD-10-CM

## 2021-12-09 PROCEDURE — 99213 OFFICE O/P EST LOW 20 MIN: CPT | Performed by: INTERNAL MEDICINE

## 2021-12-09 RX ORDER — METOPROLOL SUCCINATE 25 MG/1
25 TABLET, EXTENDED RELEASE ORAL DAILY
Qty: 180 TABLET | Refills: 3 | Status: SHIPPED | OUTPATIENT
Start: 2021-12-09 | End: 2021-12-15

## 2021-12-09 ASSESSMENT — MIFFLIN-ST. JEOR: SCORE: 1388.97

## 2021-12-09 NOTE — PROGRESS NOTES
Service Date: 12/09/2021    HISTORY OF PRESENT ILLNESS:  I saw Mr. Bates for evaluation of possible BiV device implantation today.  He is a 76-year-old [place] male who has history of coronary artery disease.  He recently was found to have severe LV dysfunction with ejection fraction down to 20%.  He has a left bundle branch block.  The Zio Patch monitor detected 1 episode of nonsustained VT.  The patient denies chest pain, shortness of breath or fatigue.  He stated that he feels well.  There is no recent hospitalization for congestive heart failure or angina.    The patient is a poor historian. He was previously cared for by Dr. Juan Arana and he moved his care to other institutions for years.  He recently came back to our service and was seen by Dr. Garcia.    PAST MEDICAL HISTORY:  Remarkable for chronic renal insufficiency, type 2 diabetes, multiple fractures, hypertension and a history of traumatic subdural hematoma.    PHYSICAL EXAMINATION:    VITAL SIGNS:  Blood pressure was 118/70, heart rate 60 beats per minute, body weight 152 pounds.  CARDIOVASCULAR:  Examination today showed no remarkable abnormalities.    ASSESSMENT AND RECOMMENDATIONS:  Mr. Bates is a 76-year-old male with ischemic cardiomyopathy and left bundle branch block.  The patient seemed to have little insight about his heart condition and has not made any decision.  He has a son who is a nephrologist and his son's father-in-law is a cardiologist in another state.  He was supposed to discuss the issue with these people, but he stated that he has not talked to anyone else.  I personally recommended CRT-D based on his overall condition and code status.  However, he has not made up his mind.  I informed the patient that if he chose to be DNR a CRT-P device may be reasonable.  In addition, if he feels well and he does not want to have any device implantation that is also considered reasonable.  In the end of the clinic visit, the patient did not  make a decision.  I wrote down the 3 different options he can take including doing nothing and continue medications, CRT-P and CRT-D.  He will talk to his family members and call back to us for his final decision.  Otherwise, he will continue follow up with Dr. Garcia.      Daiana Grey MD    cc:  Alvaro Lindo MD  [facility]  [address]  [place]  [address]    Flavio Garcia MD  [address], [address]  [place]  [address]    Daiana Grey MD        D: 2021   T: 2021   MT: ERIK    Name:     OLEG SANCHEZAleena  MRN:      [id number]        Account:      [id number]   :      1945           Service Date: 2021       Document: [id number]

## 2021-12-09 NOTE — PROGRESS NOTES
Service Date: 2021    ADDENDUM:  After I finished the clinic visit, the patient brought his wife back.  According to the wife, they have discussed with family members and have decided for CRT-D placement.  I therefore ordered the CRT-D and re-explained the risks and benefits.    Daiana Grey MD        D: 2021   T: 2021   MT: ERIK    Name:     OLEG SANCHEZAleena  MRN:      [id number]        Account:      [id number]   :      1945           Service Date: 2021       Document: [id number]
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
## 2021-12-15 DIAGNOSIS — Z95.1 S/P CABG (CORONARY ARTERY BYPASS GRAFT): ICD-10-CM

## 2021-12-15 RX ORDER — METOPROLOL SUCCINATE 25 MG/1
TABLET, EXTENDED RELEASE ORAL
Qty: 360 TABLET | Refills: 3 | Status: SHIPPED | OUTPATIENT
Start: 2021-12-15 | End: 2022-05-17

## 2021-12-16 ENCOUNTER — LAB (OUTPATIENT)
Dept: LAB | Facility: CLINIC | Age: 76
End: 2021-12-16
Payer: COMMERCIAL

## 2021-12-16 DIAGNOSIS — I50.22 CHRONIC SYSTOLIC HEART FAILURE (H): ICD-10-CM

## 2021-12-16 DIAGNOSIS — I25.5 ISCHEMIC CARDIOMYOPATHY: ICD-10-CM

## 2021-12-16 LAB
ANION GAP SERPL CALCULATED.3IONS-SCNC: 6 MMOL/L (ref 3–14)
BUN SERPL-MCNC: 19 MG/DL (ref 7–30)
CALCIUM SERPL-MCNC: 8.9 MG/DL (ref 8.5–10.1)
CHLORIDE BLD-SCNC: 107 MMOL/L (ref 94–109)
CO2 SERPL-SCNC: 26 MMOL/L (ref 20–32)
CREAT SERPL-MCNC: 1.61 MG/DL (ref 0.66–1.25)
GFR SERPL CREATININE-BSD FRML MDRD: 41 ML/MIN/1.73M2
GLUCOSE BLD-MCNC: 208 MG/DL (ref 70–99)
POTASSIUM BLD-SCNC: 4.3 MMOL/L (ref 3.4–5.3)
SODIUM SERPL-SCNC: 139 MMOL/L (ref 133–144)

## 2021-12-16 PROCEDURE — 80048 BASIC METABOLIC PNL TOTAL CA: CPT

## 2021-12-16 PROCEDURE — 36415 COLL VENOUS BLD VENIPUNCTURE: CPT

## 2021-12-17 ENCOUNTER — CARE COORDINATION (OUTPATIENT)
Dept: CARDIOLOGY | Facility: CLINIC | Age: 76
End: 2021-12-17
Payer: COMMERCIAL

## 2021-12-17 NOTE — PROGRESS NOTES
Call out to pt's wife. She returned CARLOTA Carrington, call from yesterday regarding BMP lab results. BMP was rechecked as Dr. Garcia increased Entresto dose to 97/103 mg BID at last OV in November. NO answer. I left a detailed VM letting pt and his wife know that the BMP result is stable and no changes needed on our end as long as Adal is tolerating the dose without issues such as symptomatic hypotension. If Adal is feeling fine on this dose then continue as is. I gave my phone number for them to reach me if have they have questions or concerns. Patience Steiner RN on 12/17/2021 at 11:35 AM      Component      Latest Ref Rng & Units 11/8/2021 11/26/2021 12/16/2021   Sodium      133 - 144 mmol/L 140 138 139   Potassium      3.4 - 5.3 mmol/L 4.2 4.5 4.3   Chloride      94 - 109 mmol/L 108 106 107   Carbon Dioxide      20 - 32 mmol/L 24 29 26   Anion Gap      3 - 14 mmol/L 8 3 6   Urea Nitrogen      7 - 30 mg/dL 24 24 19   Creatinine      0.66 - 1.25 mg/dL 1.63 (H) 1.61 (H) 1.61 (H)   Calcium      8.5 - 10.1 mg/dL 9.0 9.0 8.9   Glucose      70 - 99 mg/dL 243 (H) 218 (H) 208 (H)   GFR Estimate      >60 mL/min/1.73m2 40 (L) 41 (L) 41 (L)

## 2021-12-21 DIAGNOSIS — I25.5 ISCHEMIC CARDIOMYOPATHY: Primary | ICD-10-CM

## 2021-12-21 DIAGNOSIS — I44.7 LEFT BUNDLE BRANCH BLOCK (LBBB): ICD-10-CM

## 2021-12-21 RX ORDER — LIDOCAINE 40 MG/G
CREAM TOPICAL
Status: CANCELLED | OUTPATIENT
Start: 2021-12-21

## 2021-12-21 RX ORDER — SODIUM CHLORIDE 450 MG/100ML
INJECTION, SOLUTION INTRAVENOUS CONTINUOUS
Status: CANCELLED | OUTPATIENT
Start: 2021-12-21

## 2021-12-21 RX ORDER — CEFAZOLIN SODIUM 2 G/100ML
2 INJECTION, SOLUTION INTRAVENOUS
Status: CANCELLED | OUTPATIENT
Start: 2021-12-21

## 2021-12-21 NOTE — PROGRESS NOTES
Called patient and spoke with patient's wife regarding pre-procedure instructions for device implant:     Anticoagulation: N/A  Oral diabetes meds: Hold Jardiance and Metformin morning of procedure.   Insulin: N/A  Diuretic: Spironolactone hold in AM  Contrast allergy: No   Pt informed to be NPO at midnight if procedure scheduled 1pm or later, may have clear liquid breakfast before 8am.    Instructed pt to shower the morning of the procedure, and then put on a clean shirt in order to help prevent infection.     Pt has post-procedure transportation and 24 hours monitoring set up. (Pt reminded to call before coming in if their plans change, with limited bed availability due to COVID, overnight hospital stays will be allowed for clinical reasons only)  Pt aware of no driving for 24 hours post procedure due to sedation.     COVID test scheduled: 12/24/21    Pt/ Wife reminded to self-quarantine from the time of the COVID test to the procedure.    Pt/wife aware of arrival time of 1030am and location. Pt verbalized understanding of instructions.

## 2021-12-24 ENCOUNTER — LAB (OUTPATIENT)
Dept: LAB | Facility: CLINIC | Age: 76
End: 2021-12-24
Payer: COMMERCIAL

## 2021-12-24 DIAGNOSIS — Z11.59 ENCOUNTER FOR SCREENING FOR OTHER VIRAL DISEASES: ICD-10-CM

## 2021-12-24 LAB — SARS-COV-2 RNA RESP QL NAA+PROBE: NEGATIVE

## 2021-12-24 PROCEDURE — U0003 INFECTIOUS AGENT DETECTION BY NUCLEIC ACID (DNA OR RNA); SEVERE ACUTE RESPIRATORY SYNDROME CORONAVIRUS 2 (SARS-COV-2) (CORONAVIRUS DISEASE [COVID-19]), AMPLIFIED PROBE TECHNIQUE, MAKING USE OF HIGH THROUGHPUT TECHNOLOGIES AS DESCRIBED BY CMS-2020-01-R: HCPCS

## 2021-12-27 ENCOUNTER — HOSPITAL ENCOUNTER (OUTPATIENT)
Facility: CLINIC | Age: 76
Discharge: HOME | End: 2021-12-27
Admitting: INTERNAL MEDICINE
Payer: COMMERCIAL

## 2021-12-27 ENCOUNTER — APPOINTMENT (OUTPATIENT)
Dept: GENERAL RADIOLOGY | Facility: CLINIC | Age: 76
End: 2021-12-27
Attending: INTERNAL MEDICINE
Payer: COMMERCIAL

## 2021-12-27 VITALS
WEIGHT: 153.2 LBS | OXYGEN SATURATION: 98 % | HEIGHT: 68 IN | TEMPERATURE: 97.5 F | SYSTOLIC BLOOD PRESSURE: 131 MMHG | RESPIRATION RATE: 16 BRPM | DIASTOLIC BLOOD PRESSURE: 77 MMHG | BODY MASS INDEX: 23.22 KG/M2 | HEART RATE: 60 BPM

## 2021-12-27 DIAGNOSIS — I25.5 ISCHEMIC CARDIOMYOPATHY: ICD-10-CM

## 2021-12-27 DIAGNOSIS — I44.7 LEFT BUNDLE BRANCH BLOCK (LBBB): ICD-10-CM

## 2021-12-27 LAB
ANION GAP SERPL CALCULATED.3IONS-SCNC: 5 MMOL/L (ref 3–14)
BUN SERPL-MCNC: 23 MG/DL (ref 7–30)
CALCIUM SERPL-MCNC: 9.7 MG/DL (ref 8.5–10.1)
CHLORIDE BLD-SCNC: 109 MMOL/L (ref 94–109)
CO2 SERPL-SCNC: 25 MMOL/L (ref 20–32)
CREAT SERPL-MCNC: 1.7 MG/DL (ref 0.66–1.25)
ERYTHROCYTE [DISTWIDTH] IN BLOOD BY AUTOMATED COUNT: 14.5 % (ref 10–15)
GFR SERPL CREATININE-BSD FRML MDRD: 41 ML/MIN/1.73M2
GLUCOSE BLD-MCNC: 160 MG/DL (ref 70–99)
HCT VFR BLD AUTO: 47.3 % (ref 40–53)
HGB BLD-MCNC: 14.7 G/DL (ref 13.3–17.7)
MCH RBC QN AUTO: 27.7 PG (ref 26.5–33)
MCHC RBC AUTO-ENTMCNC: 31.1 G/DL (ref 31.5–36.5)
MCV RBC AUTO: 89 FL (ref 78–100)
PLATELET # BLD AUTO: 197 10E3/UL (ref 150–450)
POTASSIUM BLD-SCNC: 4.7 MMOL/L (ref 3.4–5.3)
RBC # BLD AUTO: 5.31 10E6/UL (ref 4.4–5.9)
SODIUM SERPL-SCNC: 139 MMOL/L (ref 133–144)
WBC # BLD AUTO: 6.3 10E3/UL (ref 4–11)

## 2021-12-27 PROCEDURE — 99153 MOD SED SAME PHYS/QHP EA: CPT | Performed by: INTERNAL MEDICINE

## 2021-12-27 PROCEDURE — 250N000009 HC RX 250: Performed by: INTERNAL MEDICINE

## 2021-12-27 PROCEDURE — C1882 AICD, OTHER THAN SING/DUAL: HCPCS | Performed by: INTERNAL MEDICINE

## 2021-12-27 PROCEDURE — C1898 LEAD, PMKR, OTHER THAN TRANS: HCPCS | Performed by: INTERNAL MEDICINE

## 2021-12-27 PROCEDURE — 80048 BASIC METABOLIC PNL TOTAL CA: CPT | Performed by: INTERNAL MEDICINE

## 2021-12-27 PROCEDURE — C1730 CATH, EP, 19 OR FEW ELECT: HCPCS | Performed by: INTERNAL MEDICINE

## 2021-12-27 PROCEDURE — 258N000002 HC RX IP 258 OP 250: Performed by: INTERNAL MEDICINE

## 2021-12-27 PROCEDURE — 33249 INSJ/RPLCMT DEFIB W/LEAD(S): CPT | Performed by: INTERNAL MEDICINE

## 2021-12-27 PROCEDURE — 999N000065 XR CHEST 2 VW

## 2021-12-27 PROCEDURE — C1894 INTRO/SHEATH, NON-LASER: HCPCS | Performed by: INTERNAL MEDICINE

## 2021-12-27 PROCEDURE — 33225 L VENTRIC PACING LEAD ADD-ON: CPT | Performed by: INTERNAL MEDICINE

## 2021-12-27 PROCEDURE — C1769 GUIDE WIRE: HCPCS | Performed by: INTERNAL MEDICINE

## 2021-12-27 PROCEDURE — 999N000184 HC STATISTIC TELEMETRY

## 2021-12-27 PROCEDURE — 250N000011 HC RX IP 250 OP 636: Performed by: INTERNAL MEDICINE

## 2021-12-27 PROCEDURE — C1900 LEAD, CORONARY VENOUS: HCPCS | Performed by: INTERNAL MEDICINE

## 2021-12-27 PROCEDURE — 99152 MOD SED SAME PHYS/QHP 5/>YRS: CPT | Performed by: INTERNAL MEDICINE

## 2021-12-27 PROCEDURE — C1895 LEAD, AICD, ENDO DUAL COIL: HCPCS | Performed by: INTERNAL MEDICINE

## 2021-12-27 PROCEDURE — 272N000001 HC OR GENERAL SUPPLY STERILE: Performed by: INTERNAL MEDICINE

## 2021-12-27 PROCEDURE — 250N000013 HC RX MED GY IP 250 OP 250 PS 637

## 2021-12-27 PROCEDURE — 93005 ELECTROCARDIOGRAM TRACING: CPT

## 2021-12-27 PROCEDURE — 36415 COLL VENOUS BLD VENIPUNCTURE: CPT | Performed by: INTERNAL MEDICINE

## 2021-12-27 PROCEDURE — 85027 COMPLETE CBC AUTOMATED: CPT | Performed by: INTERNAL MEDICINE

## 2021-12-27 PROCEDURE — 999N000071 HC STATISTIC HEART CATH LAB OR EP LAB

## 2021-12-27 DEVICE — CRT-D RESONATE X4 IS4 DF4 G447: Type: IMPLANTABLE DEVICE | Status: FUNCTIONAL

## 2021-12-27 DEVICE — IMPLANTABLE DEVICE: Type: IMPLANTABLE DEVICE | Status: FUNCTIONAL

## 2021-12-27 DEVICE — LEAD LV ACUITY X4 STR 86CM: Type: IMPLANTABLE DEVICE | Status: FUNCTIONAL

## 2021-12-27 DEVICE — LEAD INGEVITY+ AF IS1 7840 4CM: Type: IMPLANTABLE DEVICE | Status: FUNCTIONAL

## 2021-12-27 RX ORDER — NALOXONE HYDROCHLORIDE 0.4 MG/ML
0.2 INJECTION, SOLUTION INTRAMUSCULAR; INTRAVENOUS; SUBCUTANEOUS
Status: DISCONTINUED | OUTPATIENT
Start: 2021-12-27 | End: 2021-12-27 | Stop reason: HOSPADM

## 2021-12-27 RX ORDER — NALOXONE HYDROCHLORIDE 0.4 MG/ML
0.4 INJECTION, SOLUTION INTRAMUSCULAR; INTRAVENOUS; SUBCUTANEOUS
Status: DISCONTINUED | OUTPATIENT
Start: 2021-12-27 | End: 2021-12-27 | Stop reason: HOSPADM

## 2021-12-27 RX ORDER — OXYCODONE AND ACETAMINOPHEN 5; 325 MG/1; MG/1
1 TABLET ORAL EVERY 4 HOURS PRN
Status: DISCONTINUED | OUTPATIENT
Start: 2021-12-27 | End: 2021-12-27 | Stop reason: HOSPADM

## 2021-12-27 RX ORDER — BUPIVACAINE HYDROCHLORIDE 2.5 MG/ML
INJECTION, SOLUTION EPIDURAL; INFILTRATION; INTRACAUDAL
Status: DISCONTINUED | OUTPATIENT
Start: 2021-12-27 | End: 2021-12-27 | Stop reason: HOSPADM

## 2021-12-27 RX ORDER — ACETAMINOPHEN 325 MG/1
650 TABLET ORAL ONCE
Status: COMPLETED | OUTPATIENT
Start: 2021-12-27 | End: 2021-12-27

## 2021-12-27 RX ORDER — LIDOCAINE 40 MG/G
CREAM TOPICAL
Status: DISCONTINUED | OUTPATIENT
Start: 2021-12-27 | End: 2021-12-27 | Stop reason: HOSPADM

## 2021-12-27 RX ORDER — CEFAZOLIN SODIUM 2 G/100ML
2 INJECTION, SOLUTION INTRAVENOUS
Status: COMPLETED | OUTPATIENT
Start: 2021-12-27 | End: 2021-12-27

## 2021-12-27 RX ORDER — FENTANYL CITRATE 50 UG/ML
INJECTION, SOLUTION INTRAMUSCULAR; INTRAVENOUS
Status: DISCONTINUED | OUTPATIENT
Start: 2021-12-27 | End: 2021-12-27 | Stop reason: HOSPADM

## 2021-12-27 RX ORDER — SODIUM CHLORIDE 450 MG/100ML
INJECTION, SOLUTION INTRAVENOUS CONTINUOUS
Status: DISCONTINUED | OUTPATIENT
Start: 2021-12-27 | End: 2021-12-27 | Stop reason: HOSPADM

## 2021-12-27 RX ADMIN — CEFAZOLIN SODIUM 2 G: 2 INJECTION, SOLUTION INTRAVENOUS at 12:55

## 2021-12-27 RX ADMIN — ACETAMINOPHEN 650 MG: 325 TABLET, FILM COATED ORAL at 15:34

## 2021-12-27 RX ADMIN — SODIUM CHLORIDE: 4.5 INJECTION, SOLUTION INTRAVENOUS at 10:53

## 2021-12-27 ASSESSMENT — MIFFLIN-ST. JEOR: SCORE: 1394.41

## 2021-12-27 NOTE — PROGRESS NOTES
1525 Report received from Alicia Loyd RN.  1530 Pt A/O. Drsg CDI to left chest. No oozing or hematoma noted. Area soft & flat. Pt denies pain. Pt instructed on activity restrictions with left arm. Verbal understanding received. Pt's wife at bedside. Detailed update given.   1540 Pt taking diet & flds well. No complaints.  1600 OOB - steady on feet. Ambulated in halls to bathroom with good ervin.   1605 Pt to radiology for CXR. See results.  1620 Device check done at this time.  1650 Discharge teaching & instructions given to both pt & wife w/ verbal understanding received. All questions & concerns addressed.  1700 Pt discharged per w/c to private vehicle. All personal belongings taken w/ pt.

## 2021-12-27 NOTE — PROGRESS NOTES
Care Suites Admission Nursing Note    Patient Information  Name: Adal Bates  Age: 76 year old  Reason for admission: ICD implant  Care Suites arrival time: 1000    Visitor Information  Name: Jane  Informed of visitor restrictions: Yes  1 visitor allowed per patient   Visitor must screen negative for COVID symptoms   Visitor must wear a mask  Waiting rooms closed to visitors    Patient Admission/Assessment   Pre-procedure assessment complete: Yes  If abnormal assessment/labs, provider notified: N/A  NPO: No, explain cup of tea at 0800 this am  Medications held per instructions/orders: Yes  Consent: deferred  If applicable, pregnancy test status: deferred  Patient oriented to room: Yes  Education/questions answered: Yes  Plan/other: Labs pending at this time    Discharge Planning  Discharge name/phone number: Jane wife here today with pt  Overnight post sedation caregiver: wife  Discharge location: home    Alicia Loyd RN

## 2021-12-27 NOTE — DISCHARGE INSTRUCTIONS
ICD Implant Discharge Instructions    After you go home:      Have an adult stay with you until tomorrow.    You may resume your normal diet.       For 24 hours - due to the sedation you received:    Relax and take it easy.    Do NOT make any important or legal decisions.    Do NOT drive or operate machines at home or at work.    Do NOT drink alcohol.    Care of Chest Incision:      Keep the bandage on at least 3 days. You may remove the dressing on Friday morning. Change it only if it gets loose or soaked. If you need to change it, use 4x4-inch gauze and a large clear bandage.     Leave the strips of tape on. They will fall off on their own, or we will remove them at your first check-up.    Check your wound daily for signs of infection, such as increased redness, severe swelling or draining. Fever may also be a sign of infection. Call us if you see any of these signs.    If there are no signs of infection, you may shower after the bandage comes off in 3 days. If you take a tub bath, keep the wound dry.    No soaking the incision (swimming pool, bathtub, hot tub) for 2 weeks.    You may have mild to medium pain for 3 to 5 days. Take Acetaminophen (Tylenol) or Ibuprofen (Advil) for the pain. If the pain persists or is severe, call us.    Activity:      For at least 2 weeks: Do not raise your elbow above your shoulder. You can begin to use your arm as it feels comfortable to you.    Do not use arm on implant side to lift more than 10 pounds for 2 weeks.    In 6 to 8 weeks: You may begin to golf, play tennis, swim and do similar activities.    No driving for one day & limit to necessary driving for one week. Please talk to your doctor for specific recommendations.    Bleeding:      If you start bleeding from the incision site, sit down and press firmly on the site for 10 minutes.     Once bleeding stops, call [facility] as soon as you can.    Call 911 right away if you have heavy bleeding or bleeding that does  not stop.      Medicines:      Take your medications, including blood thinners, unless your provider tells you not to.    If you have stopped any medicines, check with your provider about when to restart them.    Follow Up Appointments:      Follow up with Device Clinic at [facility] of patient preference in 7-10 days.    Call the clinic if:      You have a large or growing hard lump around the site.    The site is red, swollen, hot or tender.    Blood or fluid is draining from the site.    You have chills or a fever greater than 101 F (38 C).    You feel dizzy or light-headed.    Questions or concerns.    Telling others about your device:      Before you leave the hospital, you will receive a temporary ID card. A permanent card will be mailed to you about 6 to 8 weeks later. Always carry the ID card with you. It has important details about your device.    You may also get a Medical Alert bracelet or tag that says you have a defibrillator (ICD).  Go to [email or web address].     Always tell doctors, dentists and other care providers that you have a device implanted in you.    Let us know before you plan any surgeries. Your care team must take special steps to keep you safe during certain procedures. These steps will depend on the type of device you have. Your provider will need to see your ID card. They may need to call us for instructions.    Device Safety:      Please refer to device  s booklet for further information.          [facility] [place]:    [phone] [facility] (7 days a week)

## 2021-12-27 NOTE — PROGRESS NOTES
PATIENT/VISITOR WELLNESS SCREENING    Step 1 Patient Screening    1. In the last month, have you been in contact with someone who was confirmed or suspected to have Coronavirus/COVID-19? No    2. Do you have the following symptoms?  Fever/Chills? No   Cough? No   Shortness of breath? No   New loss of taste or smell? No  Sore throat? No  Muscle or body aches? No  Headaches? No  Fatigue? No  Vomiting or diarrhea? No    Step 2 Visitor Screening    1. Name of Visitor (1 visitor per patient): Jane    2. In the last month, have you been in contact with someone who was confirmed or suspected to have Coronavirus/COVID-19? No    3. Do you have the following symptoms?  Fever/Chills? No   Cough? No   Shortness of breath? No   Skin rash? No   Loss of taste or smell? No  Sore throat? No  Runny or stuffy nose? No  Muscle or body aches? No  Headaches? No  Fatigue? No  Vomiting or diarrhea? No    If the visitor has positive symptoms, notify supervisor/manger  Per policy, the visitor will need to leave the facility     Step 3 Refer to logic grid below for actions    NO SYMPTOM(S)    ACTIONS:  1. Standard rooming process  2. Provider to assess per normal protocol  3. Implement precautions as needed and per guidelines     POSITIVE SYMPTOM(S)  If positive for ANY of the following symptoms: fever, cough, shortness of breath, rash    ACTION:  1. Continue to have the patient wear a mask   2. Room patient as soon as possible  3. Don appropriate PPE when entering room  4. Provider evaluation

## 2021-12-27 NOTE — PROGRESS NOTES
Care Suites Post Procedure Note    Patient Information  Name: Adal Bates  Age: 76 year old    Post Procedure  Time patient returned to Care Suites: 1420  Concerns/abnormal assessment: none  If abnormal assessment, provider notified: N/A  Plan/Other: Dressing to left chest WDL, clean, flat.  VSS,  Sleepy.  Declines any food at this time, taking in po water.  Call light in reach, will cont to monitor.    Alicia Loyd RN

## 2021-12-27 NOTE — PRE-PROCEDURE
GENERAL PRE-PROCEDURE:   Procedure:  Biventricular defibrillator implantation  Date/Time:  12/27/2021 12:38 PM    Verbal consent obtained?: Yes    Written consent obtained?: No    Risks and benefits: Risks, benefits and alternatives were discussed    Consent given by:  Patient  Patient states understanding of procedure being performed: Yes    Patient's understanding of procedure matches consent: Yes    Procedure consent matches procedure scheduled: Yes    Appropriately NPO:  Yes  ASA Class:  3  Mallampati  :  Grade 1- soft palate, uvula, tonsillar pillars, and posterior pharyngeal wall visible  Lungs:  Lungs clear with good breath sounds bilaterally  Heart:  Normal heart sounds and rate  History & Physical reviewed:  History and physical reviewed and no updates needed  Statement of review:  I have reviewed the lab findings, diagnostic data, medications, and the plan for sedation

## 2021-12-27 NOTE — PROGRESS NOTES
CRT-D implantation.  No complications.  May be discharged around 4-5 pm.  Resume home medications.

## 2021-12-28 ENCOUNTER — TELEPHONE (OUTPATIENT)
Dept: CARDIOLOGY | Facility: CLINIC | Age: 76
End: 2021-12-28
Payer: COMMERCIAL

## 2021-12-28 DIAGNOSIS — I25.5 ISCHEMIC CARDIOMYOPATHY: Primary | ICD-10-CM

## 2021-12-28 NOTE — TELEPHONE ENCOUNTER
Post device implant discharge phone call.    Reviewed the following with patient's wife:  No raising arm above shoulder on the side of implant for 3 weeks.     Remove outer dressing 3 days after implant 30/31st. May shower after outer dressing removed. Leave steri-strips in place, will be removed at 1 week device check      Watch for redness, drainage, warmth, or fever. Call device clinic if any signs of infection.     1 week device check scheduled: 1/5/2022 at 0815. Spoke with wife who is aware of time and location.     Pt states understanding of all instructions.

## 2021-12-29 LAB
ATRIAL RATE - MUSE: 59 BPM
ATRIAL RATE - MUSE: 60 BPM
DIASTOLIC BLOOD PRESSURE - MUSE: NORMAL MMHG
DIASTOLIC BLOOD PRESSURE - MUSE: NORMAL MMHG
INTERPRETATION ECG - MUSE: NORMAL
INTERPRETATION ECG - MUSE: NORMAL
P AXIS - MUSE: 40 DEGREES
P AXIS - MUSE: NORMAL DEGREES
PR INTERVAL - MUSE: 148 MS
PR INTERVAL - MUSE: 178 MS
QRS DURATION - MUSE: 154 MS
QRS DURATION - MUSE: 160 MS
QT - MUSE: 494 MS
QT - MUSE: 512 MS
QTC - MUSE: 489 MS
QTC - MUSE: 512 MS
R AXIS - MUSE: -53 DEGREES
R AXIS - MUSE: -61 DEGREES
SYSTOLIC BLOOD PRESSURE - MUSE: NORMAL MMHG
SYSTOLIC BLOOD PRESSURE - MUSE: NORMAL MMHG
T AXIS - MUSE: 127 DEGREES
T AXIS - MUSE: 133 DEGREES
VENTRICULAR RATE- MUSE: 59 BPM
VENTRICULAR RATE- MUSE: 60 BPM

## 2021-12-31 NOTE — TELEPHONE ENCOUNTER
Received VM from patient's wife Kelsea. She says pt is having pain in his upper left arm and wants to know what to do. He had ICD implanted on Monday 12/27 and his 1 week check is Wednesday next week 1/5.     Called and spoke with Kelsea. She said pt is having pain in his left upper arm and she just wanted to check if that is normal for recovery after his procedure. She denies any swelling or drainage or any other signs of infection. I told her it could be soreness from the incision or stiffness from not moving his arm as much. I encouraged her to have pt move his arm around down low, but do not lift it above shoulder-level, and he can take tylenol. She states understanding and feels reassured that this is normal.

## 2022-01-05 ENCOUNTER — ANCILLARY PROCEDURE (OUTPATIENT)
Dept: CARDIOLOGY | Facility: CLINIC | Age: 77
End: 2022-01-05
Attending: INTERNAL MEDICINE
Payer: COMMERCIAL

## 2022-01-05 DIAGNOSIS — I25.5 ISCHEMIC CARDIOMYOPATHY: ICD-10-CM

## 2022-01-05 LAB
MDC_IDC_LEAD_IMPLANT_DT: NORMAL
MDC_IDC_LEAD_LOCATION: NORMAL
MDC_IDC_LEAD_LOCATION_DETAIL_1: NORMAL
MDC_IDC_LEAD_MFG: NORMAL
MDC_IDC_LEAD_MODEL: NORMAL
MDC_IDC_LEAD_POLARITY_TYPE: NORMAL
MDC_IDC_LEAD_SERIAL: NORMAL
MDC_IDC_MSMT_BATTERY_DTM: NORMAL
MDC_IDC_MSMT_BATTERY_REMAINING_LONGEVITY: 132 MO
MDC_IDC_MSMT_BATTERY_REMAINING_PERCENTAGE: 100 %
MDC_IDC_MSMT_BATTERY_STATUS: NORMAL
MDC_IDC_MSMT_CAP_CHARGE_DTM: NORMAL
MDC_IDC_MSMT_CAP_CHARGE_TIME: 9 S
MDC_IDC_MSMT_CAP_CHARGE_TYPE: NORMAL
MDC_IDC_MSMT_LEADCHNL_LV_IMPEDANCE_VALUE: 1816 OHM
MDC_IDC_MSMT_LEADCHNL_LV_PACING_THRESHOLD_AMPLITUDE: 1.2 V
MDC_IDC_MSMT_LEADCHNL_LV_PACING_THRESHOLD_PULSEWIDTH: 0.5 MS
MDC_IDC_MSMT_LEADCHNL_RA_IMPEDANCE_VALUE: 621 OHM
MDC_IDC_MSMT_LEADCHNL_RA_PACING_THRESHOLD_AMPLITUDE: 1 V
MDC_IDC_MSMT_LEADCHNL_RA_PACING_THRESHOLD_PULSEWIDTH: 0.4 MS
MDC_IDC_MSMT_LEADCHNL_RV_IMPEDANCE_VALUE: 447 OHM
MDC_IDC_MSMT_LEADCHNL_RV_PACING_THRESHOLD_AMPLITUDE: 2 V
MDC_IDC_MSMT_LEADCHNL_RV_PACING_THRESHOLD_PULSEWIDTH: 0.4 MS
MDC_IDC_PG_IMPLANT_DTM: NORMAL
MDC_IDC_PG_MFG: NORMAL
MDC_IDC_PG_MODEL: NORMAL
MDC_IDC_PG_SERIAL: NORMAL
MDC_IDC_PG_TYPE: NORMAL
MDC_IDC_SESS_CLINIC_NAME: NORMAL
MDC_IDC_SESS_DTM: NORMAL
MDC_IDC_SESS_TYPE: NORMAL
MDC_IDC_SET_BRADY_AT_MODE_SWITCH_MODE: NORMAL
MDC_IDC_SET_BRADY_AT_MODE_SWITCH_RATE: 170 {BEATS}/MIN
MDC_IDC_SET_BRADY_LOWRATE: 60 {BEATS}/MIN
MDC_IDC_SET_BRADY_MAX_SENSOR_RATE: 130 {BEATS}/MIN
MDC_IDC_SET_BRADY_MAX_TRACKING_RATE: 130 {BEATS}/MIN
MDC_IDC_SET_BRADY_MODE: NORMAL
MDC_IDC_SET_BRADY_PAV_DELAY_LOW: 180 MS
MDC_IDC_SET_BRADY_SAV_DELAY_LOW: 80 MS
MDC_IDC_SET_CRT_PACED_CHAMBERS: NORMAL
MDC_IDC_SET_LEADCHNL_LV_PACING_AMPLITUDE: 2.7 V
MDC_IDC_SET_LEADCHNL_LV_PACING_ANODE_ELECTRODE_1: NORMAL
MDC_IDC_SET_LEADCHNL_LV_PACING_ANODE_LOCATION_1: NORMAL
MDC_IDC_SET_LEADCHNL_LV_PACING_CAPTURE_MODE: NORMAL
MDC_IDC_SET_LEADCHNL_LV_PACING_CATHODE_ELECTRODE_1: NORMAL
MDC_IDC_SET_LEADCHNL_LV_PACING_CATHODE_LOCATION_1: NORMAL
MDC_IDC_SET_LEADCHNL_LV_PACING_PULSEWIDTH: 0.5 MS
MDC_IDC_SET_LEADCHNL_LV_SENSING_ADAPTATION_MODE: NORMAL
MDC_IDC_SET_LEADCHNL_LV_SENSING_ANODE_ELECTRODE_1: NORMAL
MDC_IDC_SET_LEADCHNL_LV_SENSING_ANODE_LOCATION_1: NORMAL
MDC_IDC_SET_LEADCHNL_LV_SENSING_CATHODE_ELECTRODE_1: NORMAL
MDC_IDC_SET_LEADCHNL_LV_SENSING_CATHODE_LOCATION_1: NORMAL
MDC_IDC_SET_LEADCHNL_LV_SENSING_SENSITIVITY: 1 MV
MDC_IDC_SET_LEADCHNL_RA_PACING_AMPLITUDE: 2 V
MDC_IDC_SET_LEADCHNL_RA_PACING_CAPTURE_MODE: NORMAL
MDC_IDC_SET_LEADCHNL_RA_PACING_POLARITY: NORMAL
MDC_IDC_SET_LEADCHNL_RA_PACING_PULSEWIDTH: 0.4 MS
MDC_IDC_SET_LEADCHNL_RA_SENSING_ADAPTATION_MODE: NORMAL
MDC_IDC_SET_LEADCHNL_RA_SENSING_POLARITY: NORMAL
MDC_IDC_SET_LEADCHNL_RA_SENSING_SENSITIVITY: 0.25 MV
MDC_IDC_SET_LEADCHNL_RV_PACING_AMPLITUDE: 4.8 V
MDC_IDC_SET_LEADCHNL_RV_PACING_CAPTURE_MODE: NORMAL
MDC_IDC_SET_LEADCHNL_RV_PACING_POLARITY: NORMAL
MDC_IDC_SET_LEADCHNL_RV_PACING_PULSEWIDTH: 0.4 MS
MDC_IDC_SET_LEADCHNL_RV_SENSING_ADAPTATION_MODE: NORMAL
MDC_IDC_SET_LEADCHNL_RV_SENSING_POLARITY: NORMAL
MDC_IDC_SET_LEADCHNL_RV_SENSING_SENSITIVITY: 0.6 MV
MDC_IDC_SET_ZONE_DETECTION_INTERVAL: 250 MS
MDC_IDC_SET_ZONE_DETECTION_INTERVAL: 300 MS
MDC_IDC_SET_ZONE_DETECTION_INTERVAL: 353 MS
MDC_IDC_SET_ZONE_TYPE: NORMAL
MDC_IDC_SET_ZONE_VENDOR_TYPE: NORMAL
MDC_IDC_STAT_AT_BURDEN_PERCENT: 0 %
MDC_IDC_STAT_AT_DTM_END: NORMAL
MDC_IDC_STAT_AT_DTM_START: NORMAL
MDC_IDC_STAT_BRADY_DTM_END: NORMAL
MDC_IDC_STAT_BRADY_DTM_START: NORMAL
MDC_IDC_STAT_BRADY_RA_PERCENT_PACED: 6 %
MDC_IDC_STAT_BRADY_RV_PERCENT_PACED: 6 %
MDC_IDC_STAT_CRT_DTM_END: NORMAL
MDC_IDC_STAT_CRT_DTM_START: NORMAL
MDC_IDC_STAT_CRT_LV_PERCENT_PACED: 96 %
MDC_IDC_STAT_EPISODE_RECENT_COUNT: 0
MDC_IDC_STAT_EPISODE_RECENT_COUNT_DTM_END: NORMAL
MDC_IDC_STAT_EPISODE_RECENT_COUNT_DTM_START: NORMAL
MDC_IDC_STAT_EPISODE_TYPE: NORMAL
MDC_IDC_STAT_EPISODE_VENDOR_TYPE: NORMAL
MDC_IDC_STAT_TACHYTHERAPY_ATP_DELIVERED_RECENT: 0
MDC_IDC_STAT_TACHYTHERAPY_ATP_DELIVERED_TOTAL: 0
MDC_IDC_STAT_TACHYTHERAPY_RECENT_DTM_END: NORMAL
MDC_IDC_STAT_TACHYTHERAPY_RECENT_DTM_START: NORMAL
MDC_IDC_STAT_TACHYTHERAPY_SHOCKS_ABORTED_RECENT: 0
MDC_IDC_STAT_TACHYTHERAPY_SHOCKS_ABORTED_TOTAL: 0
MDC_IDC_STAT_TACHYTHERAPY_SHOCKS_DELIVERED_RECENT: 0
MDC_IDC_STAT_TACHYTHERAPY_SHOCKS_DELIVERED_TOTAL: 0
MDC_IDC_STAT_TACHYTHERAPY_TOTAL_DTM_END: NORMAL
MDC_IDC_STAT_TACHYTHERAPY_TOTAL_DTM_START: NORMAL

## 2022-01-05 PROCEDURE — 93284 PRGRMG EVAL IMPLANTABLE DFB: CPT | Performed by: INTERNAL MEDICINE

## 2022-01-24 ENCOUNTER — LAB (OUTPATIENT)
Dept: LAB | Facility: CLINIC | Age: 77
End: 2022-01-24
Payer: COMMERCIAL

## 2022-01-24 DIAGNOSIS — E11.9 TYPE 2 DIABETES, HBA1C GOAL < 8% (H): ICD-10-CM

## 2022-01-24 LAB — HBA1C MFR BLD: 8.8 % (ref 0–5.6)

## 2022-01-24 PROCEDURE — 36415 COLL VENOUS BLD VENIPUNCTURE: CPT

## 2022-01-24 PROCEDURE — 83036 HEMOGLOBIN GLYCOSYLATED A1C: CPT

## 2022-02-15 ENCOUNTER — ANCILLARY PROCEDURE (OUTPATIENT)
Dept: CARDIOLOGY | Facility: CLINIC | Age: 77
End: 2022-02-15
Attending: INTERNAL MEDICINE
Payer: COMMERCIAL

## 2022-02-15 DIAGNOSIS — I25.5 ISCHEMIC CARDIOMYOPATHY: ICD-10-CM

## 2022-02-15 DIAGNOSIS — E11.8 TYPE 2 DIABETES MELLITUS WITH COMPLICATION, WITHOUT LONG-TERM CURRENT USE OF INSULIN (H): ICD-10-CM

## 2022-02-15 PROCEDURE — 93284 PRGRMG EVAL IMPLANTABLE DFB: CPT | Performed by: INTERNAL MEDICINE

## 2022-02-16 ENCOUNTER — OFFICE VISIT (OUTPATIENT)
Dept: INTERNAL MEDICINE | Facility: CLINIC | Age: 77
End: 2022-02-16
Payer: COMMERCIAL

## 2022-02-16 VITALS
RESPIRATION RATE: 16 BRPM | TEMPERATURE: 97.3 F | OXYGEN SATURATION: 97 % | WEIGHT: 145.3 LBS | DIASTOLIC BLOOD PRESSURE: 82 MMHG | BODY MASS INDEX: 22.09 KG/M2 | HEART RATE: 80 BPM | SYSTOLIC BLOOD PRESSURE: 130 MMHG

## 2022-02-16 DIAGNOSIS — N30.01 ACUTE CYSTITIS WITH HEMATURIA: ICD-10-CM

## 2022-02-16 DIAGNOSIS — E11.8 TYPE 2 DIABETES MELLITUS WITH COMPLICATION, WITHOUT LONG-TERM CURRENT USE OF INSULIN (H): ICD-10-CM

## 2022-02-16 DIAGNOSIS — R35.0 URINARY FREQUENCY: Primary | ICD-10-CM

## 2022-02-16 LAB
ALBUMIN UR-MCNC: >=300 MG/DL
APPEARANCE UR: CLEAR
BACTERIA #/AREA URNS HPF: ABNORMAL /HPF
BILIRUB UR QL STRIP: NEGATIVE
COLOR UR AUTO: YELLOW
GLUCOSE UR STRIP-MCNC: >=1000 MG/DL
HGB UR QL STRIP: ABNORMAL
KETONES UR STRIP-MCNC: NEGATIVE MG/DL
LEUKOCYTE ESTERASE UR QL STRIP: ABNORMAL
NITRATE UR QL: NEGATIVE
PH UR STRIP: 6 [PH] (ref 5–7)
RBC #/AREA URNS AUTO: ABNORMAL /HPF
SP GR UR STRIP: 1.02 (ref 1–1.03)
UROBILINOGEN UR STRIP-ACNC: 0.2 E.U./DL
WBC #/AREA URNS AUTO: ABNORMAL /HPF

## 2022-02-16 PROCEDURE — 99214 OFFICE O/P EST MOD 30 MIN: CPT | Performed by: INTERNAL MEDICINE

## 2022-02-16 PROCEDURE — 87186 SC STD MICRODIL/AGAR DIL: CPT | Performed by: INTERNAL MEDICINE

## 2022-02-16 PROCEDURE — 81001 URINALYSIS AUTO W/SCOPE: CPT | Performed by: INTERNAL MEDICINE

## 2022-02-16 PROCEDURE — 87086 URINE CULTURE/COLONY COUNT: CPT | Performed by: INTERNAL MEDICINE

## 2022-02-16 RX ORDER — GLIPIZIDE 10 MG/1
10 TABLET, FILM COATED, EXTENDED RELEASE ORAL DAILY
Qty: 90 TABLET | Refills: 3 | Status: SHIPPED | OUTPATIENT
Start: 2022-02-16 | End: 2022-10-31

## 2022-02-16 RX ORDER — SULFAMETHOXAZOLE/TRIMETHOPRIM 800-160 MG
1 TABLET ORAL 2 TIMES DAILY
Qty: 14 TABLET | Refills: 0 | Status: SHIPPED | OUTPATIENT
Start: 2022-02-16 | End: 2022-05-17

## 2022-02-16 RX ORDER — METFORMIN HCL 500 MG
TABLET, EXTENDED RELEASE 24 HR ORAL
Qty: 360 TABLET | Refills: 0 | Status: SHIPPED | OUTPATIENT
Start: 2022-02-16 | End: 2022-05-17

## 2022-02-17 LAB
BACTERIA UR CULT: ABNORMAL
MDC_IDC_LEAD_IMPLANT_DT: NORMAL
MDC_IDC_LEAD_LOCATION: NORMAL
MDC_IDC_LEAD_LOCATION_DETAIL_1: NORMAL
MDC_IDC_LEAD_MFG: NORMAL
MDC_IDC_LEAD_MODEL: NORMAL
MDC_IDC_LEAD_POLARITY_TYPE: NORMAL
MDC_IDC_LEAD_SERIAL: NORMAL
MDC_IDC_MSMT_BATTERY_STATUS: NORMAL
MDC_IDC_MSMT_CAP_CHARGE_DTM: NORMAL
MDC_IDC_MSMT_CAP_CHARGE_ENERGY: 0 J
MDC_IDC_MSMT_CAP_CHARGE_TIME: 0 S
MDC_IDC_MSMT_CAP_CHARGE_TIME: 9.05
MDC_IDC_MSMT_CAP_CHARGE_TYPE: NORMAL
MDC_IDC_MSMT_CAP_CHARGE_TYPE: NORMAL
MDC_IDC_MSMT_LEADCHNL_LV_IMPEDANCE_VALUE: 2012 OHM
MDC_IDC_MSMT_LEADCHNL_LV_PACING_THRESHOLD_AMPLITUDE: 1.4 V
MDC_IDC_MSMT_LEADCHNL_LV_PACING_THRESHOLD_PULSEWIDTH: 0.5 MS
MDC_IDC_MSMT_LEADCHNL_LV_SENSING_INTR_AMPL: 25 MV
MDC_IDC_MSMT_LEADCHNL_RA_IMPEDANCE_VALUE: 720 OHM
MDC_IDC_MSMT_LEADCHNL_RA_PACING_THRESHOLD_AMPLITUDE: 1.3 V
MDC_IDC_MSMT_LEADCHNL_RA_PACING_THRESHOLD_PULSEWIDTH: 0.4 MS
MDC_IDC_MSMT_LEADCHNL_RA_SENSING_INTR_AMPL: 2.9 MV
MDC_IDC_MSMT_LEADCHNL_RV_IMPEDANCE_VALUE: 460 OHM
MDC_IDC_MSMT_LEADCHNL_RV_PACING_THRESHOLD_AMPLITUDE: 2 V
MDC_IDC_MSMT_LEADCHNL_RV_PACING_THRESHOLD_PULSEWIDTH: 0.4 MS
MDC_IDC_MSMT_LEADCHNL_RV_SENSING_INTR_AMPL: 25 MV
MDC_IDC_PG_IMPLANT_DTM: NORMAL
MDC_IDC_PG_MFG: NORMAL
MDC_IDC_PG_MODEL: NORMAL
MDC_IDC_PG_SERIAL: NORMAL
MDC_IDC_PG_TYPE: NORMAL
MDC_IDC_SESS_CLINIC_NAME: NORMAL
MDC_IDC_SESS_DTM: NORMAL
MDC_IDC_SESS_TYPE: NORMAL
MDC_IDC_SET_BRADY_AT_MODE_SWITCH_MODE: NORMAL
MDC_IDC_SET_BRADY_AT_MODE_SWITCH_RATE: 170 {BEATS}/MIN
MDC_IDC_SET_BRADY_HYSTRATE: NORMAL
MDC_IDC_SET_BRADY_LOWRATE: 60 {BEATS}/MIN
MDC_IDC_SET_BRADY_MAX_SENSOR_RATE: 130 {BEATS}/MIN
MDC_IDC_SET_BRADY_MAX_TRACKING_RATE: 130 {BEATS}/MIN
MDC_IDC_SET_BRADY_MODE: NORMAL
MDC_IDC_SET_BRADY_PAV_DELAY_HIGH: 180 MS
MDC_IDC_SET_BRADY_PAV_DELAY_LOW: 180 MS
MDC_IDC_SET_BRADY_SAV_DELAY_HIGH: 80 MS
MDC_IDC_SET_BRADY_SAV_DELAY_LOW: 80 MS
MDC_IDC_SET_CRT_LVRV_DELAY: 30 MS
MDC_IDC_SET_CRT_PACED_CHAMBERS: NORMAL
MDC_IDC_SET_LEADCHNL_LV_PACING_AMPLITUDE: 2.7 V
MDC_IDC_SET_LEADCHNL_LV_PACING_CAPTURE_MODE: NORMAL
MDC_IDC_SET_LEADCHNL_LV_PACING_POLARITY: NORMAL
MDC_IDC_SET_LEADCHNL_LV_PACING_PULSEWIDTH: 0.5 MS
MDC_IDC_SET_LEADCHNL_LV_SENSING_ADAPTATION_MODE: NORMAL
MDC_IDC_SET_LEADCHNL_LV_SENSING_POLARITY: NORMAL
MDC_IDC_SET_LEADCHNL_LV_SENSING_SENSITIVITY: 1 MV
MDC_IDC_SET_LEADCHNL_RA_PACING_AMPLITUDE: 2 V
MDC_IDC_SET_LEADCHNL_RA_PACING_CAPTURE_MODE: NORMAL
MDC_IDC_SET_LEADCHNL_RA_PACING_POLARITY: NORMAL
MDC_IDC_SET_LEADCHNL_RA_PACING_PULSEWIDTH: 0.4 MS
MDC_IDC_SET_LEADCHNL_RA_SENSING_ADAPTATION_MODE: NORMAL
MDC_IDC_SET_LEADCHNL_RA_SENSING_POLARITY: NORMAL
MDC_IDC_SET_LEADCHNL_RA_SENSING_SENSITIVITY: 0.25 MV
MDC_IDC_SET_LEADCHNL_RV_PACING_AMPLITUDE: 4 V
MDC_IDC_SET_LEADCHNL_RV_PACING_CAPTURE_MODE: NORMAL
MDC_IDC_SET_LEADCHNL_RV_PACING_POLARITY: NORMAL
MDC_IDC_SET_LEADCHNL_RV_PACING_PULSEWIDTH: 0.4 MS
MDC_IDC_SET_LEADCHNL_RV_SENSING_ADAPTATION_MODE: NORMAL
MDC_IDC_SET_LEADCHNL_RV_SENSING_POLARITY: NORMAL
MDC_IDC_SET_LEADCHNL_RV_SENSING_SENSITIVITY: 0.6 MV
MDC_IDC_SET_ZONE_DETECTION_INTERVAL: 250 MS
MDC_IDC_SET_ZONE_DETECTION_INTERVAL: 300 MS
MDC_IDC_SET_ZONE_DETECTION_INTERVAL: 353 MS
MDC_IDC_SET_ZONE_TYPE: NORMAL
MDC_IDC_SET_ZONE_VENDOR_TYPE: NORMAL
MDC_IDC_STAT_BRADY_RA_PERCENT_PACED: 1 %
MDC_IDC_STAT_BRADY_RV_PERCENT_PACED: 28 %
MDC_IDC_STAT_CRT_LV_PERCENT_PACED: 99 %
MDC_IDC_STAT_EPISODE_RECENT_COUNT: 0
MDC_IDC_STAT_EPISODE_RECENT_COUNT_DTM_END: NORMAL
MDC_IDC_STAT_EPISODE_RECENT_COUNT_DTM_START: NORMAL
MDC_IDC_STAT_EPISODE_TOTAL_COUNT: 0
MDC_IDC_STAT_EPISODE_TOTAL_COUNT_DTM_END: NORMAL
MDC_IDC_STAT_EPISODE_TYPE: NORMAL
MDC_IDC_STAT_EPISODE_VENDOR_TYPE: NORMAL
MDC_IDC_STAT_TACHYTHERAPY_ATP_DELIVERED_RECENT: 0
MDC_IDC_STAT_TACHYTHERAPY_ATP_DELIVERED_TOTAL: 0
MDC_IDC_STAT_TACHYTHERAPY_RECENT_DTM_END: NORMAL
MDC_IDC_STAT_TACHYTHERAPY_RECENT_DTM_START: NORMAL
MDC_IDC_STAT_TACHYTHERAPY_SHOCKS_ABORTED_RECENT: 0
MDC_IDC_STAT_TACHYTHERAPY_SHOCKS_ABORTED_TOTAL: 0
MDC_IDC_STAT_TACHYTHERAPY_SHOCKS_DELIVERED_RECENT: 0
MDC_IDC_STAT_TACHYTHERAPY_SHOCKS_DELIVERED_TOTAL: 0
MDC_IDC_STAT_TACHYTHERAPY_TOTAL_DTM_END: NORMAL

## 2022-02-17 RX ORDER — METFORMIN HCL 500 MG
TABLET, EXTENDED RELEASE 24 HR ORAL
Qty: 360 TABLET | Refills: 0 | Status: SHIPPED | OUTPATIENT
Start: 2022-02-17 | End: 2022-11-28

## 2022-03-31 ENCOUNTER — LAB (OUTPATIENT)
Dept: LAB | Facility: CLINIC | Age: 77
End: 2022-03-31
Payer: COMMERCIAL

## 2022-03-31 ENCOUNTER — HOSPITAL ENCOUNTER (OUTPATIENT)
Dept: CARDIOLOGY | Facility: CLINIC | Age: 77
Discharge: HOME | End: 2022-03-31
Attending: INTERNAL MEDICINE | Admitting: INTERNAL MEDICINE
Payer: COMMERCIAL

## 2022-03-31 DIAGNOSIS — I25.5 ISCHEMIC CARDIOMYOPATHY: ICD-10-CM

## 2022-03-31 LAB
ANION GAP SERPL CALCULATED.3IONS-SCNC: 2 MMOL/L (ref 3–14)
BUN SERPL-MCNC: 19 MG/DL (ref 7–30)
CALCIUM SERPL-MCNC: 9.3 MG/DL (ref 8.5–10.1)
CHLORIDE BLD-SCNC: 107 MMOL/L (ref 94–109)
CO2 SERPL-SCNC: 28 MMOL/L (ref 20–32)
CREAT SERPL-MCNC: 1.49 MG/DL (ref 0.66–1.25)
GFR SERPL CREATININE-BSD FRML MDRD: 48 ML/MIN/1.73M2
GLUCOSE BLD-MCNC: 156 MG/DL (ref 70–99)
LVEF ECHO: NORMAL
POTASSIUM BLD-SCNC: 4.8 MMOL/L (ref 3.4–5.3)
SODIUM SERPL-SCNC: 137 MMOL/L (ref 133–144)

## 2022-03-31 PROCEDURE — 80048 BASIC METABOLIC PNL TOTAL CA: CPT | Performed by: INTERNAL MEDICINE

## 2022-03-31 PROCEDURE — 93306 TTE W/DOPPLER COMPLETE: CPT

## 2022-03-31 PROCEDURE — 36415 COLL VENOUS BLD VENIPUNCTURE: CPT | Performed by: INTERNAL MEDICINE

## 2022-03-31 PROCEDURE — 93306 TTE W/DOPPLER COMPLETE: CPT | Mod: 26 | Performed by: INTERNAL MEDICINE

## 2022-04-04 ENCOUNTER — OFFICE VISIT (OUTPATIENT)
Dept: CARDIOLOGY | Facility: CLINIC | Age: 77
End: 2022-04-04
Payer: COMMERCIAL

## 2022-04-04 VITALS
HEART RATE: 76 BPM | HEIGHT: 68 IN | SYSTOLIC BLOOD PRESSURE: 106 MMHG | WEIGHT: 158.4 LBS | DIASTOLIC BLOOD PRESSURE: 74 MMHG | BODY MASS INDEX: 24.01 KG/M2

## 2022-04-04 DIAGNOSIS — I25.5 ISCHEMIC CARDIOMYOPATHY: ICD-10-CM

## 2022-04-04 PROCEDURE — 99214 OFFICE O/P EST MOD 30 MIN: CPT | Performed by: INTERNAL MEDICINE

## 2022-04-04 NOTE — PROGRESS NOTES
Service Date: 04/04/2022    CARDIOLOGY CLINIC CONSULTATION    REASON FOR VISIT:  Severe ischemic cardiomyopathy.    HISTORY OF PRESENTING ILLNESS:  Adal Bates is a very pleasant 76-year-old gentleman whose son is a nephrologist in [place].  He followed up with me first in 09/2021 for worsening heart failure.  The patient has a history of longstanding left bundle branch block for many years.  In 2016, he was diagnosed with severe ischemic cardiomyopathy.  Angiogram showed multivessel coronary disease.  MRI showed viability.  EF was 25% at that time.  He underwent 5-vessel coronary artery bypass grafting.  Subsequently, his EF improved to about 30%-35% in 2018 and then was lost at followup until 2021 when he saw me.  At that time, he had had worsening heart failure symptoms.  EF was down to 20%.  We did a nuclear stress test which showed anterior infarction without ischemia.  [facility]rolled [facility], made medication changes and got him on metoprolol, Entresto and spironolactone.  We even tried Jardiance; however, subsequently he did not tolerate Jardiance due to dizziness, lightheadedness, abdominal pain.  In any case, now he remains on 3-drug therapy with metoprolol, spironolactone and Entresto for his heart failure.  Given his quite large QRS of 162 milliseconds, I sent him to Dr. Grey.  He underwent a CRT-D implant end of 2021.  Subsequently, 3 months later, his EF has actually improved to 35%-40%.      Looking at his last device interrogation from February, it appears that he is 99% paced in the left ventricle, 28% paced in the right ventricle.  No ATP or shocks.  No significant atrial arrhythmias reported on that.    He tells me, overall, he is feeling better after the CRT implant.  Has more energy.  Is NYHA class II at this time.  No new symptoms reported.    PHYSICAL EXAMINATION:    VITAL SIGNS:  Blood pressure 106/74, pulse 76.  GENERAL:  Alert, oriented x3, in no acute distress.  NECK:  Supple.   JVP normal.  LUNGS:  Clear.  CARDIAC: Sounds regular.  Soft systolic murmur.  No edema.  EXTREMITIES:  Warm.  PSYCHIATRIC:  Appropriate affect.  HEENT:  No icterus or pallor.    ASSESSMENT AND PLAN:  Mr. Sanchez is doing well from a cardiac standpoint.  He has no angina.  His creatinine is stable at 1.5.  He is tolerating 3-drug therapy well.  At this point, given EF improvement after CRT and the fact that he did not tolerate SGLT2 inhibitors, I am going to stay off that.  Recommend that he follow up in 3 months in the [facility] with an KAYLA and see me back with an echocardiogram in 6 months.  I have told him if anything changes clinically, he should get in touch with us sooner.  Otherwise, I have not made any changes to his regimen.  Overall, he appears quite stable without diuretic needs.    Flavio Garcia MD     cc:  Alvaro Lindo MD   [facility]  [address], [address]  [place] [address]    Daiana Grey MD   [facility] [address], [address]  [place] [address]    Flavio Garcia MD        D: 2022   T: 2022   MT: ava    Name:     OLEG SANCHEZ  MRN:      [id number]        Account:      [id number]   :      1945           Service Date: 2022       Document: [id number]
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
## 2022-05-17 ENCOUNTER — OFFICE VISIT (OUTPATIENT)
Dept: INTERNAL MEDICINE | Facility: CLINIC | Age: 77
End: 2022-05-17
Payer: COMMERCIAL

## 2022-05-17 VITALS
WEIGHT: 157.5 LBS | HEIGHT: 68 IN | SYSTOLIC BLOOD PRESSURE: 119 MMHG | TEMPERATURE: 97.8 F | HEART RATE: 66 BPM | DIASTOLIC BLOOD PRESSURE: 80 MMHG | RESPIRATION RATE: 18 BRPM | OXYGEN SATURATION: 99 % | BODY MASS INDEX: 23.87 KG/M2

## 2022-05-17 DIAGNOSIS — N18.31 STAGE 3A CHRONIC KIDNEY DISEASE (H): ICD-10-CM

## 2022-05-17 DIAGNOSIS — E11.9 TYPE 2 DIABETES, HBA1C GOAL < 8% (H): Primary | ICD-10-CM

## 2022-05-17 DIAGNOSIS — Z11.59 NEED FOR HEPATITIS C SCREENING TEST: ICD-10-CM

## 2022-05-17 DIAGNOSIS — I25.5 ISCHEMIC CARDIOMYOPATHY: ICD-10-CM

## 2022-05-17 DIAGNOSIS — Z95.1 S/P CABG (CORONARY ARTERY BYPASS GRAFT): ICD-10-CM

## 2022-05-17 DIAGNOSIS — E11.8 TYPE 2 DIABETES MELLITUS WITH COMPLICATION, WITHOUT LONG-TERM CURRENT USE OF INSULIN (H): ICD-10-CM

## 2022-05-17 DIAGNOSIS — I50.43 ACUTE ON CHRONIC COMBINED SYSTOLIC (CONGESTIVE) AND DIASTOLIC (CONGESTIVE) HEART FAILURE (H): ICD-10-CM

## 2022-05-17 LAB — HBA1C MFR BLD: 8.1 % (ref 0–5.6)

## 2022-05-17 PROCEDURE — 86803 HEPATITIS C AB TEST: CPT | Performed by: INTERNAL MEDICINE

## 2022-05-17 PROCEDURE — 36415 COLL VENOUS BLD VENIPUNCTURE: CPT | Performed by: INTERNAL MEDICINE

## 2022-05-17 PROCEDURE — 0054A COVID-19,PF,PFIZER (12+ YRS): CPT | Performed by: INTERNAL MEDICINE

## 2022-05-17 PROCEDURE — 99214 OFFICE O/P EST MOD 30 MIN: CPT | Mod: 25 | Performed by: INTERNAL MEDICINE

## 2022-05-17 PROCEDURE — 83036 HEMOGLOBIN GLYCOSYLATED A1C: CPT | Performed by: INTERNAL MEDICINE

## 2022-05-17 PROCEDURE — 91305 COVID-19,PF,PFIZER (12+ YRS): CPT | Performed by: INTERNAL MEDICINE

## 2022-05-17 PROCEDURE — 99207 PR FOOT EXAM NO CHARGE: CPT | Performed by: INTERNAL MEDICINE

## 2022-05-17 RX ORDER — SPIRONOLACTONE 25 MG/1
25 TABLET ORAL DAILY
Qty: 90 TABLET | Refills: 3 | Status: SHIPPED | OUTPATIENT
Start: 2022-05-17 | End: 2023-06-28

## 2022-05-17 RX ORDER — METFORMIN HCL 500 MG
TABLET, EXTENDED RELEASE 24 HR ORAL
Qty: 360 TABLET | Refills: 1 | Status: SHIPPED | OUTPATIENT
Start: 2022-05-17 | End: 2022-10-10

## 2022-05-17 RX ORDER — METOPROLOL SUCCINATE 25 MG/1
TABLET, EXTENDED RELEASE ORAL
Qty: 360 TABLET | Refills: 3 | Status: SHIPPED | OUTPATIENT
Start: 2022-05-17 | End: 2022-12-07

## 2022-05-17 NOTE — NURSING NOTE
"/80 (BP Location: Left arm, Patient Position: Sitting, Cuff Size: Adult Large)   Pulse 66   Temp 97.8  F (36.6  C) (Oral)   Resp 18   Ht 1.727 m (5' 8\")   Wt 71.4 kg (157 lb 8 oz)   SpO2 99%   BMI 23.95 kg/m      "

## 2022-05-17 NOTE — LETTER
May 18, 2022      Adal Bates  [address] [facility] [address]        Dear ,    We are writing to inform you of your test results.    Improved diabetic control. Still not optimal. Try to improve diet and exercise.   Continue treatment. Recheck in 6 months.       Resulted Orders   Hepatitis C Screen Reflex to HCV RNA Quant and Genotype   Result Value Ref Range    Hepatitis C Antibody Nonreactive Nonreactive    Narrative    Assay performance characteristics have not been established for newborns, infants, and children.   Hemoglobin A1c   Result Value Ref Range    Hemoglobin A1C 8.1 (H) 0.0 - 5.6 %      Comment:      Normal <5.7%   Prediabetes 5.7-6.4%    Diabetes 6.5% or higher     Note: Adopted from ADA consensus guidelines.       If you have any questions or concerns, please call the clinic at the number listed above.       Sincerely,      Alvaro Lindo MD

## 2022-05-17 NOTE — PROGRESS NOTES
Assessment & Plan     Need for hepatitis C screening test    - Hepatitis C Screen Reflex to HCV RNA Quant and Genotype    Stage 3a chronic kidney disease (H)  Stable, monitor     Type 2 diabetes, HbA1C goal < 8% (H)  Assess lab  , improved glycemic control   - Hemoglobin A1c    Type 2 diabetes mellitus with complication, without long-term current use of insulin (H)    - metFORMIN (GLUCOPHAGE XR) 500 MG 24 hr tablet; TAKE 2 TABLETS BY MOUTH TWICE DAILY WITH MEALS    S/P CABG (coronary artery bypass graft)  Cont treatment   - metoprolol succinate ER (TOPROL XL) 25 MG 24 hr tablet; Take 2 tablets twice daily.    Ischemic cardiomyopathy  Cont treatment   - spironolactone (ALDACTONE) 25 MG tablet; Take 1 tablet (25 mg) by mouth daily    See Patient Instructions    Return in about 6 months (around 11/17/2022) for Physical Exam.    Alvaro Lindo MD  [facility] ROJELIO Galeas is a 76 year old who presents for the following health issues  accompanied by his spouse.    HPI     Patient is seen for a follow up visit.  No acute complaints, no medication change or new medical conditions.    Diabetes Follow-up  Has H/O DM. On diet , exercise and oral treatment. Blood sugars are controlled. No parestesias. No hypoglycemias.    How often are you checking your blood sugar? A few times a week  What time of day are you checking your blood sugars (select all that apply)?  Before meals  Have you had any blood sugars above 200?  No  Have you had any blood sugars below 70?  No    What symptoms do you notice when your blood sugar is low?  None    What concerns do you have today about your diabetes? None     Do you have any of these symptoms? (Select all that apply)  Numbness in feet    Have you had a diabetic eye exam in the last 12 months? No          Hyperlipidemia Follow-Up  Has H/O hyperlipidemia. On medical treatment and diet. No side effects. No muscle weakness, myalgias or upset stomach.        Are you regularly taking any medication or supplement to lower your cholesterol?   Yes- atorvastatin     Are you having muscle aches or other side effects that you think could be caused by your cholesterol lowering medication?  No    Hypertension Follow-up  Has h/o HTN. on medical treatment. BP has been controlled. No side effects from medications. No CP, HA, dizziness. good compliance with medications and low salt diet.      Do you check your blood pressure regularly outside of the clinic? Yes     Are you following a low salt diet? Yes    Are your blood pressures ever more than 140 on the top number (systolic) OR more   than 90 on the bottom number (diastolic), for example 140/90? No    BP Readings from Last 2 Encounters:   04/04/22 106/74   02/16/22 130/82     Hemoglobin A1C POCT (%)   Date Value   05/28/2021 7.6 (H)   10/12/2020 7.1 (H)     Hemoglobin A1C (%)   Date Value   01/24/2022 8.8 (H)   09/20/2021 6.5 (H)     LDL Cholesterol Calculated (mg/dL)   Date Value   10/22/2021 53   10/12/2020 62   11/18/2019 55         How many servings of fruits and vegetables do you eat daily?  2-3    On average, how many sweetened beverages do you drink each day (Examples: soda, juice, sweet tea, etc.  Do NOT count diet or artificially sweetened beverages)?   0    How many days per week do you exercise enough to make your heart beat faster? 4    How many minutes a day do you exercise enough to make your heart beat faster? 60 or more    How many days per week do you miss taking your medication? 0    Has h/o ischemic heart disease, asymptomatic regarding chest pains, SOB,palpitations. Has good compliance with treatment, diet and exercise.      Review of Systems   Constitutional, HEENT, cardiovascular, pulmonary, gi and gu systems are negative, except as otherwise noted.      Objective    There were no vitals taken for this visit.  There is no height or weight on file to calculate BMI.  Physical Exam   GENERAL: healthy,  alert and no distress  NECK: no adenopathy, no asymmetry, masses, or scars and thyroid normal to palpation  RESP: lungs clear to auscultation - no rales, rhonchi or wheezes  CV: regular rate and rhythm, normal S1 S2, no S3 or S4, no murmur, click or rub, no peripheral edema and peripheral pulses strong  ABDOMEN: soft, nontender, no hepatosplenomegaly, no masses and bowel sounds normal  MS: no gross musculoskeletal defects noted, no edema  Diabetic foot exam: normal DP and PT pulses, no trophic changes or ulcerative lesions and normal sensory exam    Hospital Outpatient Visit on 03/31/2022   Component Date Value Ref Range Status     LVEF  03/31/2022 35-40%   Final

## 2022-05-18 ENCOUNTER — ANCILLARY PROCEDURE (OUTPATIENT)
Dept: CARDIOLOGY | Facility: CLINIC | Age: 77
End: 2022-05-18
Attending: INTERNAL MEDICINE
Payer: COMMERCIAL

## 2022-05-18 DIAGNOSIS — I25.5 ISCHEMIC CARDIOMYOPATHY: ICD-10-CM

## 2022-05-18 LAB
HCV AB SERPL QL IA: NONREACTIVE
MDC_IDC_EPISODE_DTM: NORMAL
MDC_IDC_EPISODE_ID: NORMAL
MDC_IDC_EPISODE_TYPE: NORMAL
MDC_IDC_LEAD_IMPLANT_DT: NORMAL
MDC_IDC_LEAD_LOCATION: NORMAL
MDC_IDC_LEAD_LOCATION_DETAIL_1: NORMAL
MDC_IDC_LEAD_MFG: NORMAL
MDC_IDC_LEAD_MODEL: NORMAL
MDC_IDC_LEAD_POLARITY_TYPE: NORMAL
MDC_IDC_LEAD_SERIAL: NORMAL
MDC_IDC_MSMT_BATTERY_DTM: NORMAL
MDC_IDC_MSMT_BATTERY_REMAINING_LONGEVITY: 132 MO
MDC_IDC_MSMT_BATTERY_REMAINING_PERCENTAGE: 100 %
MDC_IDC_MSMT_BATTERY_STATUS: NORMAL
MDC_IDC_MSMT_CAP_CHARGE_DTM: NORMAL
MDC_IDC_MSMT_CAP_CHARGE_TIME: 9 S
MDC_IDC_MSMT_CAP_CHARGE_TYPE: NORMAL
MDC_IDC_MSMT_LEADCHNL_LV_IMPEDANCE_VALUE: 1781 OHM
MDC_IDC_MSMT_LEADCHNL_LV_PACING_THRESHOLD_AMPLITUDE: 1.3 V
MDC_IDC_MSMT_LEADCHNL_LV_PACING_THRESHOLD_PULSEWIDTH: 0.5 MS
MDC_IDC_MSMT_LEADCHNL_RA_IMPEDANCE_VALUE: 692 OHM
MDC_IDC_MSMT_LEADCHNL_RA_PACING_THRESHOLD_AMPLITUDE: 0.5 V
MDC_IDC_MSMT_LEADCHNL_RA_PACING_THRESHOLD_PULSEWIDTH: 0.4 MS
MDC_IDC_MSMT_LEADCHNL_RV_IMPEDANCE_VALUE: 475 OHM
MDC_IDC_MSMT_LEADCHNL_RV_PACING_THRESHOLD_AMPLITUDE: 1.4 V
MDC_IDC_MSMT_LEADCHNL_RV_PACING_THRESHOLD_PULSEWIDTH: 0.4 MS
MDC_IDC_PG_IMPLANT_DTM: NORMAL
MDC_IDC_PG_MFG: NORMAL
MDC_IDC_PG_MODEL: NORMAL
MDC_IDC_PG_SERIAL: NORMAL
MDC_IDC_PG_TYPE: NORMAL
MDC_IDC_SESS_CLINIC_NAME: NORMAL
MDC_IDC_SESS_DTM: NORMAL
MDC_IDC_SESS_TYPE: NORMAL
MDC_IDC_SET_BRADY_AT_MODE_SWITCH_MODE: NORMAL
MDC_IDC_SET_BRADY_AT_MODE_SWITCH_RATE: 170 {BEATS}/MIN
MDC_IDC_SET_BRADY_LOWRATE: 60 {BEATS}/MIN
MDC_IDC_SET_BRADY_MAX_SENSOR_RATE: 130 {BEATS}/MIN
MDC_IDC_SET_BRADY_MAX_TRACKING_RATE: 130 {BEATS}/MIN
MDC_IDC_SET_BRADY_MODE: NORMAL
MDC_IDC_SET_BRADY_PAV_DELAY_LOW: 180 MS
MDC_IDC_SET_BRADY_SAV_DELAY_LOW: 80 MS
MDC_IDC_SET_CRT_PACED_CHAMBERS: NORMAL
MDC_IDC_SET_LEADCHNL_LV_PACING_AMPLITUDE: 2.5 V
MDC_IDC_SET_LEADCHNL_LV_PACING_ANODE_ELECTRODE_1: NORMAL
MDC_IDC_SET_LEADCHNL_LV_PACING_ANODE_LOCATION_1: NORMAL
MDC_IDC_SET_LEADCHNL_LV_PACING_CAPTURE_MODE: NORMAL
MDC_IDC_SET_LEADCHNL_LV_PACING_CATHODE_ELECTRODE_1: NORMAL
MDC_IDC_SET_LEADCHNL_LV_PACING_CATHODE_LOCATION_1: NORMAL
MDC_IDC_SET_LEADCHNL_LV_PACING_PULSEWIDTH: 0.5 MS
MDC_IDC_SET_LEADCHNL_LV_SENSING_ADAPTATION_MODE: NORMAL
MDC_IDC_SET_LEADCHNL_LV_SENSING_ANODE_ELECTRODE_1: NORMAL
MDC_IDC_SET_LEADCHNL_LV_SENSING_ANODE_LOCATION_1: NORMAL
MDC_IDC_SET_LEADCHNL_LV_SENSING_CATHODE_ELECTRODE_1: NORMAL
MDC_IDC_SET_LEADCHNL_LV_SENSING_CATHODE_LOCATION_1: NORMAL
MDC_IDC_SET_LEADCHNL_LV_SENSING_SENSITIVITY: 1 MV
MDC_IDC_SET_LEADCHNL_RA_PACING_AMPLITUDE: 2 V
MDC_IDC_SET_LEADCHNL_RA_PACING_CAPTURE_MODE: NORMAL
MDC_IDC_SET_LEADCHNL_RA_PACING_POLARITY: NORMAL
MDC_IDC_SET_LEADCHNL_RA_PACING_PULSEWIDTH: 0.4 MS
MDC_IDC_SET_LEADCHNL_RA_SENSING_ADAPTATION_MODE: NORMAL
MDC_IDC_SET_LEADCHNL_RA_SENSING_POLARITY: NORMAL
MDC_IDC_SET_LEADCHNL_RA_SENSING_SENSITIVITY: 0.25 MV
MDC_IDC_SET_LEADCHNL_RV_PACING_AMPLITUDE: 3.8 V
MDC_IDC_SET_LEADCHNL_RV_PACING_CAPTURE_MODE: NORMAL
MDC_IDC_SET_LEADCHNL_RV_PACING_POLARITY: NORMAL
MDC_IDC_SET_LEADCHNL_RV_PACING_PULSEWIDTH: 0.4 MS
MDC_IDC_SET_LEADCHNL_RV_SENSING_ADAPTATION_MODE: NORMAL
MDC_IDC_SET_LEADCHNL_RV_SENSING_POLARITY: NORMAL
MDC_IDC_SET_LEADCHNL_RV_SENSING_SENSITIVITY: 0.6 MV
MDC_IDC_SET_ZONE_DETECTION_INTERVAL: 250 MS
MDC_IDC_SET_ZONE_DETECTION_INTERVAL: 300 MS
MDC_IDC_SET_ZONE_DETECTION_INTERVAL: 353 MS
MDC_IDC_SET_ZONE_TYPE: NORMAL
MDC_IDC_SET_ZONE_VENDOR_TYPE: NORMAL
MDC_IDC_STAT_AT_BURDEN_PERCENT: 0 %
MDC_IDC_STAT_AT_DTM_END: NORMAL
MDC_IDC_STAT_AT_DTM_START: NORMAL
MDC_IDC_STAT_BRADY_DTM_END: NORMAL
MDC_IDC_STAT_BRADY_DTM_START: NORMAL
MDC_IDC_STAT_BRADY_RA_PERCENT_PACED: 1 %
MDC_IDC_STAT_BRADY_RV_PERCENT_PACED: 80 %
MDC_IDC_STAT_CRT_DTM_END: NORMAL
MDC_IDC_STAT_CRT_DTM_START: NORMAL
MDC_IDC_STAT_CRT_LV_PERCENT_PACED: 100 %
MDC_IDC_STAT_EPISODE_RECENT_COUNT: 0
MDC_IDC_STAT_EPISODE_RECENT_COUNT_DTM_END: NORMAL
MDC_IDC_STAT_EPISODE_RECENT_COUNT_DTM_START: NORMAL
MDC_IDC_STAT_EPISODE_TYPE: NORMAL
MDC_IDC_STAT_EPISODE_VENDOR_TYPE: NORMAL
MDC_IDC_STAT_TACHYTHERAPY_ATP_DELIVERED_RECENT: 0
MDC_IDC_STAT_TACHYTHERAPY_ATP_DELIVERED_TOTAL: 0
MDC_IDC_STAT_TACHYTHERAPY_RECENT_DTM_END: NORMAL
MDC_IDC_STAT_TACHYTHERAPY_RECENT_DTM_START: NORMAL
MDC_IDC_STAT_TACHYTHERAPY_SHOCKS_ABORTED_RECENT: 0
MDC_IDC_STAT_TACHYTHERAPY_SHOCKS_ABORTED_TOTAL: 0
MDC_IDC_STAT_TACHYTHERAPY_SHOCKS_DELIVERED_RECENT: 0
MDC_IDC_STAT_TACHYTHERAPY_SHOCKS_DELIVERED_TOTAL: 0
MDC_IDC_STAT_TACHYTHERAPY_TOTAL_DTM_END: NORMAL
MDC_IDC_STAT_TACHYTHERAPY_TOTAL_DTM_START: NORMAL

## 2022-05-18 PROCEDURE — 93295 DEV INTERROG REMOTE 1/2/MLT: CPT | Performed by: INTERNAL MEDICINE

## 2022-05-18 PROCEDURE — 93296 REM INTERROG EVL PM/IDS: CPT | Performed by: INTERNAL MEDICINE

## 2022-06-08 ENCOUNTER — ANCILLARY PROCEDURE (OUTPATIENT)
Dept: CARDIOLOGY | Facility: CLINIC | Age: 77
End: 2022-06-08
Attending: INTERNAL MEDICINE
Payer: COMMERCIAL

## 2022-06-08 DIAGNOSIS — I25.5 ISCHEMIC CARDIOMYOPATHY: ICD-10-CM

## 2022-06-08 DIAGNOSIS — I44.7 LEFT BUNDLE BRANCH BLOCK (LBBB): Primary | ICD-10-CM

## 2022-06-29 DIAGNOSIS — I50.22 CHRONIC SYSTOLIC HEART FAILURE (H): Primary | ICD-10-CM

## 2022-06-29 NOTE — PROGRESS NOTES
[facility]- [facility]    Orders added for BMP and BNP prior to OV with Sachin Cabrera CNP on 7/5/22. I added a lab appointment at 0945 on 7/5/22. I left pt  with lab appointment details.    Future Appointments   Date Time Provider Department Center   7/5/2022  9:45 AM RU LAB [facility] [place] [facility]   7/5/2022 10:30 AM Molly Cabrera APRN CNP [facility] PSA CLIN   9/14/2022 12:00 AM MARTINEZ DCR2 [facility] PSA CLIN   10/3/2022  8:30 AM [email or web address] [facility] RS[facility]   10/3/2022  9:45 AM RU LAB [facility] [place] [facility]
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
## 2022-07-05 ENCOUNTER — DOCUMENTATION ONLY (OUTPATIENT)
Dept: CARDIOLOGY | Facility: CLINIC | Age: 77
End: 2022-07-05

## 2022-07-05 ENCOUNTER — LAB (OUTPATIENT)
Dept: LAB | Facility: CLINIC | Age: 77
End: 2022-07-05
Payer: COMMERCIAL

## 2022-07-05 ENCOUNTER — OFFICE VISIT (OUTPATIENT)
Dept: CARDIOLOGY | Facility: CLINIC | Age: 77
End: 2022-07-05
Attending: INTERNAL MEDICINE
Payer: COMMERCIAL

## 2022-07-05 VITALS
BODY MASS INDEX: 24.13 KG/M2 | HEIGHT: 68 IN | SYSTOLIC BLOOD PRESSURE: 128 MMHG | WEIGHT: 159.2 LBS | OXYGEN SATURATION: 99 % | HEART RATE: 68 BPM | DIASTOLIC BLOOD PRESSURE: 76 MMHG

## 2022-07-05 DIAGNOSIS — I50.22 CHRONIC SYSTOLIC HEART FAILURE (H): ICD-10-CM

## 2022-07-05 DIAGNOSIS — I25.5 ISCHEMIC CARDIOMYOPATHY: ICD-10-CM

## 2022-07-05 LAB
ANION GAP SERPL CALCULATED.3IONS-SCNC: 8 MMOL/L (ref 3–14)
BUN SERPL-MCNC: 21 MG/DL (ref 7–30)
CALCIUM SERPL-MCNC: 8.9 MG/DL (ref 8.5–10.1)
CHLORIDE BLD-SCNC: 108 MMOL/L (ref 94–109)
CO2 SERPL-SCNC: 22 MMOL/L (ref 20–32)
CREAT SERPL-MCNC: 1.56 MG/DL (ref 0.66–1.25)
GFR SERPL CREATININE-BSD FRML MDRD: 45 ML/MIN/1.73M2
GLUCOSE BLD-MCNC: 280 MG/DL (ref 70–99)
NT-PROBNP SERPL-MCNC: 504 PG/ML (ref 0–1800)
POTASSIUM BLD-SCNC: 4.5 MMOL/L (ref 3.4–5.3)
SODIUM SERPL-SCNC: 138 MMOL/L (ref 133–144)

## 2022-07-05 PROCEDURE — 36415 COLL VENOUS BLD VENIPUNCTURE: CPT | Performed by: INTERNAL MEDICINE

## 2022-07-05 PROCEDURE — 80048 BASIC METABOLIC PNL TOTAL CA: CPT | Performed by: INTERNAL MEDICINE

## 2022-07-05 PROCEDURE — 99214 OFFICE O/P EST MOD 30 MIN: CPT | Performed by: NURSE PRACTITIONER

## 2022-07-05 PROCEDURE — 83880 ASSAY OF NATRIURETIC PEPTIDE: CPT | Performed by: INTERNAL MEDICINE

## 2022-07-05 NOTE — LETTER
July 5, 2022       TO: Adal Bates   [address] [facility] [address]       Dear Mr. Bates,    The results of your recent Laboratory tests.    Results for orders placed or performed in visit on 07/05/22   Basic metabolic panel     Status: Abnormal   Result Value Ref Range    Sodium 138 133 - 144 mmol/L    Potassium 4.5 3.4 - 5.3 mmol/L    Chloride 108 94 - 109 mmol/L    Carbon Dioxide (CO2) 22 20 - 32 mmol/L    Anion Gap 8 3 - 14 mmol/L    Urea Nitrogen 21 7 - 30 mg/dL    Creatinine 1.56 (H) 0.66 - 1.25 mg/dL    Calcium 8.9 8.5 - 10.1 mg/dL    Glucose 280 (H) 70 - 99 mg/dL    GFR Estimate 45 (L) >60 mL/min/1.73m2     Your test results fall within the expected range(s) or remain unchanged from previous results.  Please continue with current treatment plan.      Sincerely,    [facility]

## 2022-07-05 NOTE — PROGRESS NOTES
"[facility]    Called pt to review lab work results as reviewed by Sachin Carbera CNP.   Per Sachin Cabrera CNP she writes,     \"Bmp reviewed. BMP is stable. Repeat in Oct. please call Adal with the results. Molly Cabrera APRRAFFY, CNP\"     I spoke to pt's spouse, Kelsea, on the consent to communicate. Kelsea is agreeable with results and expresses understanding. She would like copy of results mailed to pt. Copy sent 7/5/22.    Component      Latest Ref Rng & Units 7/5/2022   Sodium      133 - 144 mmol/L 138   Potassium      3.4 - 5.3 mmol/L 4.5   Chloride      94 - 109 mmol/L 108   Carbon Dioxide      20 - 32 mmol/L 22   Anion Gap      3 - 14 mmol/L 8   Urea Nitrogen      7 - 30 mg/dL 21   Creatinine      0.66 - 1.25 mg/dL 1.56 (H)   Calcium      8.5 - 10.1 mg/dL 8.9   Glucose      70 - 99 mg/dL 280 (H)   GFR Estimate      >60 mL/min/1.73m2 45 (L)     Future Appointments   Date Time Provider Department Center   9/14/2022 12:00 AM [facility]   10/3/2022  8:30 AM [id number] [facility] RSCC   10/3/2022  9:45 AM RU LAB [facility] [place] RID   10/10/2022  2:00 PM Flavio Garcia MD [facility] PSA CLIN     Rosemary Uribe, RN, BSN  [facility]- [place], [place]  [facility] Clinic Care Coordinator  July 5, 2022 1:00 PM        "

## 2022-07-05 NOTE — PROGRESS NOTES
Bmp reviewed. BMP is stable. Repeat in Oct. please call Zuleyma with the results. Molly WILKERSON, CNP

## 2022-07-05 NOTE — PATIENT INSTRUCTIONS
Thank you for your visit with the [facility] today.   [facility] stands for Cardiomyopathy Optimization Rehabilitation and Education. The CORE clinic will teach and help you to manage your heart failure and keep you out of the hospital    Call C.O.R.E. nurse for any questions or concerns Mon-Fri 8am-4pm                                       [phone]: Nurse number                                        After Hours Phone Number:  [phone]    Medication changes:   No changes    Plan from today:   Call primary about the swallowing / eating issue   See Dr. Garcia with echo and labs in Oct.
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
## 2022-08-18 DIAGNOSIS — I25.5 ISCHEMIC CARDIOMYOPATHY: ICD-10-CM

## 2022-08-22 RX ORDER — ATORVASTATIN CALCIUM 40 MG/1
TABLET, FILM COATED ORAL
Qty: 90 TABLET | Refills: 2 | Status: SHIPPED | OUTPATIENT
Start: 2022-08-22 | End: 2023-05-16

## 2022-08-22 NOTE — TELEPHONE ENCOUNTER
Atorvastatin (Lipitor) 40 mg tab  Prescription approved per [facility] Refill Protocol.    LOV 05/2022

## 2022-09-14 ENCOUNTER — ANCILLARY PROCEDURE (OUTPATIENT)
Dept: CARDIOLOGY | Facility: CLINIC | Age: 77
End: 2022-09-14
Attending: INTERNAL MEDICINE
Payer: COMMERCIAL

## 2022-09-14 DIAGNOSIS — I25.5 ISCHEMIC CARDIOMYOPATHY: ICD-10-CM

## 2022-09-14 DIAGNOSIS — I44.7 LEFT BUNDLE BRANCH BLOCK (LBBB): ICD-10-CM

## 2022-09-14 PROCEDURE — 93296 REM INTERROG EVL PM/IDS: CPT | Performed by: INTERNAL MEDICINE

## 2022-09-14 PROCEDURE — 93295 DEV INTERROG REMOTE 1/2/MLT: CPT | Performed by: INTERNAL MEDICINE

## 2022-09-17 LAB
MDC_IDC_EPISODE_DTM: NORMAL
MDC_IDC_EPISODE_DURATION: 4 S
MDC_IDC_EPISODE_ID: NORMAL
MDC_IDC_EPISODE_TYPE: NORMAL
MDC_IDC_LEAD_IMPLANT_DT: NORMAL
MDC_IDC_LEAD_LOCATION: NORMAL
MDC_IDC_LEAD_LOCATION_DETAIL_1: NORMAL
MDC_IDC_LEAD_MFG: NORMAL
MDC_IDC_LEAD_MODEL: NORMAL
MDC_IDC_LEAD_POLARITY_TYPE: NORMAL
MDC_IDC_LEAD_SERIAL: NORMAL
MDC_IDC_MSMT_BATTERY_DTM: NORMAL
MDC_IDC_MSMT_BATTERY_REMAINING_LONGEVITY: 132 MO
MDC_IDC_MSMT_BATTERY_REMAINING_PERCENTAGE: 100 %
MDC_IDC_MSMT_BATTERY_STATUS: NORMAL
MDC_IDC_MSMT_CAP_CHARGE_DTM: NORMAL
MDC_IDC_MSMT_CAP_CHARGE_TIME: 9.5 S
MDC_IDC_MSMT_CAP_CHARGE_TYPE: NORMAL
MDC_IDC_MSMT_LEADCHNL_LV_IMPEDANCE_VALUE: 1546 OHM
MDC_IDC_MSMT_LEADCHNL_LV_PACING_THRESHOLD_AMPLITUDE: 1.2 V
MDC_IDC_MSMT_LEADCHNL_LV_PACING_THRESHOLD_PULSEWIDTH: 0.5 MS
MDC_IDC_MSMT_LEADCHNL_RA_IMPEDANCE_VALUE: 621 OHM
MDC_IDC_MSMT_LEADCHNL_RA_PACING_THRESHOLD_AMPLITUDE: 0.6 V
MDC_IDC_MSMT_LEADCHNL_RA_PACING_THRESHOLD_PULSEWIDTH: 0.4 MS
MDC_IDC_MSMT_LEADCHNL_RV_IMPEDANCE_VALUE: 451 OHM
MDC_IDC_MSMT_LEADCHNL_RV_PACING_THRESHOLD_AMPLITUDE: 1.7 V
MDC_IDC_MSMT_LEADCHNL_RV_PACING_THRESHOLD_PULSEWIDTH: 0.4 MS
MDC_IDC_PG_IMPLANT_DTM: NORMAL
MDC_IDC_PG_MFG: NORMAL
MDC_IDC_PG_MODEL: NORMAL
MDC_IDC_PG_SERIAL: NORMAL
MDC_IDC_PG_TYPE: NORMAL
MDC_IDC_SESS_CLINIC_NAME: NORMAL
MDC_IDC_SESS_DTM: NORMAL
MDC_IDC_SESS_TYPE: NORMAL
MDC_IDC_SET_BRADY_AT_MODE_SWITCH_MODE: NORMAL
MDC_IDC_SET_BRADY_AT_MODE_SWITCH_RATE: 170 {BEATS}/MIN
MDC_IDC_SET_BRADY_LOWRATE: 60 {BEATS}/MIN
MDC_IDC_SET_BRADY_MAX_SENSOR_RATE: 130 {BEATS}/MIN
MDC_IDC_SET_BRADY_MAX_TRACKING_RATE: 130 {BEATS}/MIN
MDC_IDC_SET_BRADY_MODE: NORMAL
MDC_IDC_SET_BRADY_PAV_DELAY_LOW: 180 MS
MDC_IDC_SET_BRADY_SAV_DELAY_LOW: 80 MS
MDC_IDC_SET_CRT_PACED_CHAMBERS: NORMAL
MDC_IDC_SET_LEADCHNL_LV_PACING_AMPLITUDE: 2.5 V
MDC_IDC_SET_LEADCHNL_LV_PACING_ANODE_ELECTRODE_1: NORMAL
MDC_IDC_SET_LEADCHNL_LV_PACING_ANODE_LOCATION_1: NORMAL
MDC_IDC_SET_LEADCHNL_LV_PACING_CAPTURE_MODE: NORMAL
MDC_IDC_SET_LEADCHNL_LV_PACING_CATHODE_ELECTRODE_1: NORMAL
MDC_IDC_SET_LEADCHNL_LV_PACING_CATHODE_LOCATION_1: NORMAL
MDC_IDC_SET_LEADCHNL_LV_PACING_PULSEWIDTH: 0.5 MS
MDC_IDC_SET_LEADCHNL_LV_SENSING_ADAPTATION_MODE: NORMAL
MDC_IDC_SET_LEADCHNL_LV_SENSING_ANODE_ELECTRODE_1: NORMAL
MDC_IDC_SET_LEADCHNL_LV_SENSING_ANODE_LOCATION_1: NORMAL
MDC_IDC_SET_LEADCHNL_LV_SENSING_CATHODE_ELECTRODE_1: NORMAL
MDC_IDC_SET_LEADCHNL_LV_SENSING_CATHODE_LOCATION_1: NORMAL
MDC_IDC_SET_LEADCHNL_LV_SENSING_SENSITIVITY: 1 MV
MDC_IDC_SET_LEADCHNL_RA_PACING_AMPLITUDE: 2 V
MDC_IDC_SET_LEADCHNL_RA_PACING_CAPTURE_MODE: NORMAL
MDC_IDC_SET_LEADCHNL_RA_PACING_POLARITY: NORMAL
MDC_IDC_SET_LEADCHNL_RA_PACING_PULSEWIDTH: 0.4 MS
MDC_IDC_SET_LEADCHNL_RA_SENSING_ADAPTATION_MODE: NORMAL
MDC_IDC_SET_LEADCHNL_RA_SENSING_POLARITY: NORMAL
MDC_IDC_SET_LEADCHNL_RA_SENSING_SENSITIVITY: 0.25 MV
MDC_IDC_SET_LEADCHNL_RV_PACING_AMPLITUDE: 3.8 V
MDC_IDC_SET_LEADCHNL_RV_PACING_CAPTURE_MODE: NORMAL
MDC_IDC_SET_LEADCHNL_RV_PACING_POLARITY: NORMAL
MDC_IDC_SET_LEADCHNL_RV_PACING_PULSEWIDTH: 0.4 MS
MDC_IDC_SET_LEADCHNL_RV_SENSING_ADAPTATION_MODE: NORMAL
MDC_IDC_SET_LEADCHNL_RV_SENSING_POLARITY: NORMAL
MDC_IDC_SET_LEADCHNL_RV_SENSING_SENSITIVITY: 0.6 MV
MDC_IDC_SET_ZONE_DETECTION_INTERVAL: 250 MS
MDC_IDC_SET_ZONE_DETECTION_INTERVAL: 300 MS
MDC_IDC_SET_ZONE_DETECTION_INTERVAL: 353 MS
MDC_IDC_SET_ZONE_TYPE: NORMAL
MDC_IDC_SET_ZONE_VENDOR_TYPE: NORMAL
MDC_IDC_STAT_AT_BURDEN_PERCENT: 1 %
MDC_IDC_STAT_AT_DTM_END: NORMAL
MDC_IDC_STAT_AT_DTM_START: NORMAL
MDC_IDC_STAT_BRADY_DTM_END: NORMAL
MDC_IDC_STAT_BRADY_DTM_START: NORMAL
MDC_IDC_STAT_BRADY_RA_PERCENT_PACED: 4 %
MDC_IDC_STAT_BRADY_RV_PERCENT_PACED: 69 %
MDC_IDC_STAT_CRT_DTM_END: NORMAL
MDC_IDC_STAT_CRT_DTM_START: NORMAL
MDC_IDC_STAT_CRT_LV_PERCENT_PACED: 100 %
MDC_IDC_STAT_EPISODE_RECENT_COUNT: 0
MDC_IDC_STAT_EPISODE_RECENT_COUNT: 1
MDC_IDC_STAT_EPISODE_RECENT_COUNT_DTM_END: NORMAL
MDC_IDC_STAT_EPISODE_RECENT_COUNT_DTM_START: NORMAL
MDC_IDC_STAT_EPISODE_TYPE: NORMAL
MDC_IDC_STAT_EPISODE_VENDOR_TYPE: NORMAL
MDC_IDC_STAT_TACHYTHERAPY_ATP_DELIVERED_RECENT: 0
MDC_IDC_STAT_TACHYTHERAPY_ATP_DELIVERED_TOTAL: 0
MDC_IDC_STAT_TACHYTHERAPY_RECENT_DTM_END: NORMAL
MDC_IDC_STAT_TACHYTHERAPY_RECENT_DTM_START: NORMAL
MDC_IDC_STAT_TACHYTHERAPY_SHOCKS_ABORTED_RECENT: 0
MDC_IDC_STAT_TACHYTHERAPY_SHOCKS_ABORTED_TOTAL: 0
MDC_IDC_STAT_TACHYTHERAPY_SHOCKS_DELIVERED_RECENT: 0
MDC_IDC_STAT_TACHYTHERAPY_SHOCKS_DELIVERED_TOTAL: 0
MDC_IDC_STAT_TACHYTHERAPY_TOTAL_DTM_END: NORMAL
MDC_IDC_STAT_TACHYTHERAPY_TOTAL_DTM_START: NORMAL

## 2022-10-03 ENCOUNTER — LAB (OUTPATIENT)
Dept: LAB | Facility: CLINIC | Age: 77
End: 2022-10-03

## 2022-10-03 ENCOUNTER — HOSPITAL ENCOUNTER (OUTPATIENT)
Dept: CARDIOLOGY | Facility: CLINIC | Age: 77
Discharge: HOME | End: 2022-10-03
Attending: INTERNAL MEDICINE | Admitting: INTERNAL MEDICINE
Payer: COMMERCIAL

## 2022-10-03 DIAGNOSIS — I25.5 ISCHEMIC CARDIOMYOPATHY: ICD-10-CM

## 2022-10-03 LAB
ANION GAP SERPL CALCULATED.3IONS-SCNC: 9 MMOL/L (ref 7–15)
BUN SERPL-MCNC: 16 MG/DL (ref 8–23)
CALCIUM SERPL-MCNC: 9.1 MG/DL (ref 8.8–10.2)
CHLORIDE SERPL-SCNC: 105 MMOL/L (ref 98–107)
CREAT SERPL-MCNC: 1.66 MG/DL (ref 0.67–1.17)
DEPRECATED HCO3 PLAS-SCNC: 24 MMOL/L (ref 22–29)
GFR SERPL CREATININE-BSD FRML MDRD: 42 ML/MIN/1.73M2
GLUCOSE SERPL-MCNC: 242 MG/DL (ref 70–99)
LVEF ECHO: NORMAL
POTASSIUM SERPL-SCNC: 4.2 MMOL/L (ref 3.4–5.3)
SODIUM SERPL-SCNC: 138 MMOL/L (ref 136–145)

## 2022-10-03 PROCEDURE — 80048 BASIC METABOLIC PNL TOTAL CA: CPT | Performed by: INTERNAL MEDICINE

## 2022-10-03 PROCEDURE — 255N000002 HC RX 255 OP 636: Performed by: INTERNAL MEDICINE

## 2022-10-03 PROCEDURE — 93306 TTE W/DOPPLER COMPLETE: CPT | Mod: 26 | Performed by: INTERNAL MEDICINE

## 2022-10-03 PROCEDURE — 999N000208 ECHOCARDIOGRAM COMPLETE

## 2022-10-03 RX ADMIN — HUMAN ALBUMIN MICROSPHERES AND PERFLUTREN 3 ML: 10; .22 INJECTION, SOLUTION INTRAVENOUS at 09:16

## 2022-10-10 ENCOUNTER — OFFICE VISIT (OUTPATIENT)
Dept: CARDIOLOGY | Facility: CLINIC | Age: 77
End: 2022-10-10
Attending: INTERNAL MEDICINE
Payer: COMMERCIAL

## 2022-10-10 VITALS
DIASTOLIC BLOOD PRESSURE: 62 MMHG | HEART RATE: 70 BPM | OXYGEN SATURATION: 98 % | BODY MASS INDEX: 24.1 KG/M2 | WEIGHT: 159 LBS | SYSTOLIC BLOOD PRESSURE: 94 MMHG | HEIGHT: 68 IN

## 2022-10-10 DIAGNOSIS — I25.5 ISCHEMIC CARDIOMYOPATHY: ICD-10-CM

## 2022-10-10 DIAGNOSIS — I50.22 CHRONIC SYSTOLIC HEART FAILURE (H): ICD-10-CM

## 2022-10-10 PROCEDURE — 99214 OFFICE O/P EST MOD 30 MIN: CPT | Performed by: INTERNAL MEDICINE

## 2022-10-10 RX ORDER — SACUBITRIL AND VALSARTAN 97; 103 MG/1; MG/1
1 TABLET, FILM COATED ORAL 2 TIMES DAILY
Qty: 180 TABLET | Refills: 3 | Status: SHIPPED | OUTPATIENT
Start: 2022-10-10 | End: 2023-09-12
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
## 2022-10-31 ENCOUNTER — OFFICE VISIT (OUTPATIENT)
Dept: INTERNAL MEDICINE | Facility: CLINIC | Age: 77
End: 2022-10-31
Payer: COMMERCIAL

## 2022-10-31 VITALS
DIASTOLIC BLOOD PRESSURE: 82 MMHG | BODY MASS INDEX: 23.84 KG/M2 | HEART RATE: 69 BPM | WEIGHT: 157.3 LBS | HEIGHT: 68 IN | RESPIRATION RATE: 14 BRPM | TEMPERATURE: 97.4 F | OXYGEN SATURATION: 99 % | SYSTOLIC BLOOD PRESSURE: 130 MMHG

## 2022-10-31 DIAGNOSIS — I25.10 CORONARY ARTERY DISEASE INVOLVING NATIVE CORONARY ARTERY OF NATIVE HEART WITHOUT ANGINA PECTORIS: ICD-10-CM

## 2022-10-31 DIAGNOSIS — N18.32 STAGE 3B CHRONIC KIDNEY DISEASE (H): ICD-10-CM

## 2022-10-31 DIAGNOSIS — Z23 NEED FOR PROPHYLACTIC VACCINATION AND INOCULATION AGAINST INFLUENZA: ICD-10-CM

## 2022-10-31 DIAGNOSIS — Z13.220 SCREENING FOR HYPERLIPIDEMIA: ICD-10-CM

## 2022-10-31 DIAGNOSIS — Z12.5 SCREENING FOR PROSTATE CANCER: ICD-10-CM

## 2022-10-31 DIAGNOSIS — Z00.00 ENCOUNTER FOR PREVENTATIVE ADULT HEALTH CARE EXAMINATION: Primary | ICD-10-CM

## 2022-10-31 DIAGNOSIS — R35.0 BENIGN PROSTATIC HYPERPLASIA WITH URINARY FREQUENCY: ICD-10-CM

## 2022-10-31 DIAGNOSIS — I10 HYPERTENSION GOAL BP (BLOOD PRESSURE) < 140/90: ICD-10-CM

## 2022-10-31 DIAGNOSIS — E11.8 TYPE 2 DIABETES MELLITUS WITH COMPLICATION, WITHOUT LONG-TERM CURRENT USE OF INSULIN (H): ICD-10-CM

## 2022-10-31 DIAGNOSIS — N40.1 BENIGN PROSTATIC HYPERPLASIA WITH URINARY FREQUENCY: ICD-10-CM

## 2022-10-31 DIAGNOSIS — E78.5 HYPERLIPIDEMIA WITH TARGET LDL LESS THAN 70: ICD-10-CM

## 2022-10-31 LAB
ERYTHROCYTE [DISTWIDTH] IN BLOOD BY AUTOMATED COUNT: 13.7 % (ref 10–15)
HBA1C MFR BLD: 7.2 % (ref 0–5.6)
HCT VFR BLD AUTO: 46.8 % (ref 40–53)
HGB BLD-MCNC: 15 G/DL (ref 13.3–17.7)
MCH RBC QN AUTO: 28.7 PG (ref 26.5–33)
MCHC RBC AUTO-ENTMCNC: 32.1 G/DL (ref 31.5–36.5)
MCV RBC AUTO: 90 FL (ref 78–100)
PLATELET # BLD AUTO: 195 10E3/UL (ref 150–450)
RBC # BLD AUTO: 5.23 10E6/UL (ref 4.4–5.9)
WBC # BLD AUTO: 7.2 10E3/UL (ref 4–11)

## 2022-10-31 PROCEDURE — 85027 COMPLETE CBC AUTOMATED: CPT | Performed by: INTERNAL MEDICINE

## 2022-10-31 PROCEDURE — 36415 COLL VENOUS BLD VENIPUNCTURE: CPT | Performed by: INTERNAL MEDICINE

## 2022-10-31 PROCEDURE — 82043 UR ALBUMIN QUANTITATIVE: CPT | Performed by: INTERNAL MEDICINE

## 2022-10-31 PROCEDURE — 90662 IIV NO PRSV INCREASED AG IM: CPT | Performed by: INTERNAL MEDICINE

## 2022-10-31 PROCEDURE — 80061 LIPID PANEL: CPT | Performed by: INTERNAL MEDICINE

## 2022-10-31 PROCEDURE — G0439 PPPS, SUBSEQ VISIT: HCPCS | Performed by: INTERNAL MEDICINE

## 2022-10-31 PROCEDURE — G0008 ADMIN INFLUENZA VIRUS VAC: HCPCS | Performed by: INTERNAL MEDICINE

## 2022-10-31 PROCEDURE — 84450 TRANSFERASE (AST) (SGOT): CPT | Performed by: INTERNAL MEDICINE

## 2022-10-31 PROCEDURE — G0103 PSA SCREENING: HCPCS | Performed by: INTERNAL MEDICINE

## 2022-10-31 PROCEDURE — 99213 OFFICE O/P EST LOW 20 MIN: CPT | Mod: 25 | Performed by: INTERNAL MEDICINE

## 2022-10-31 PROCEDURE — 83036 HEMOGLOBIN GLYCOSYLATED A1C: CPT | Performed by: INTERNAL MEDICINE

## 2022-10-31 PROCEDURE — 84460 ALANINE AMINO (ALT) (SGPT): CPT | Performed by: INTERNAL MEDICINE

## 2022-10-31 RX ORDER — FINASTERIDE 5 MG/1
1 TABLET, FILM COATED ORAL DAILY
Qty: 90 TABLET | Refills: 3 | Status: SHIPPED | OUTPATIENT
Start: 2022-10-31 | End: 2023-09-07

## 2022-10-31 RX ORDER — GLIPIZIDE 10 MG/1
10 TABLET, FILM COATED, EXTENDED RELEASE ORAL DAILY
Qty: 90 TABLET | Refills: 3 | Status: SHIPPED | OUTPATIENT
Start: 2022-10-31 | End: 2023-09-07

## 2022-10-31 ASSESSMENT — PAIN SCALES - GENERAL: PAINLEVEL: NO PAIN (0)

## 2022-10-31 ASSESSMENT — ACTIVITIES OF DAILY LIVING (ADL): CURRENT_FUNCTION: NO ASSISTANCE NEEDED

## 2022-10-31 NOTE — PROGRESS NOTES
"SUBJECTIVE:   Adal is a 77 year old who presents for Preventive Visit.      Patient has been advised of split billing requirements and indicates understanding: Yes  Are you in the first 12 months of your Medicare coverage?  No    Healthy Habits:     In general, how would you rate your overall health?  Excellent    Frequency of exercise:  6-7 days/week    Duration of exercise:  Greater than 60 minutes    Do you usually eat at least 4 servings of fruit and vegetables a day, include whole grains    & fiber and avoid regularly eating high fat or \"junk\" foods?  Yes    Taking medications regularly:  Yes    Medication side effects:  Other    Ability to successfully perform activities of daily living:  No assistance needed    Home Safety:  No safety concerns identified    Hearing Impairment:  No hearing concerns    In the past 6 months, have you been bothered by leaking of urine?  No    In general, how would you rate your overall mental or emotional health?  Excellent      PHQ-2 Total Score: 0    Additional concerns today:  No    Do you feel safe in your environment? Yes    Have you ever done Advance Care Planning? (For example, a Health Directive, POLST, or a discussion with a medical provider or your loved ones about your wishes): No, advance care planning information given to patient to review.  Patient declined advance care planning discussion at this time.       Fall risk  Fallen 2 or more times in the past year?: No  Any fall with injury in the past year?: No    Cognitive Screening   1) Repeat 3 items (Leader, Season, Table)    2) Clock draw: NORMAL  3) 3 item recall: Recalls 2 objects   Results: NORMAL clock, 1-2 items recalled: COGNITIVE IMPAIRMENT LESS LIKELY    Mini-CogTM Copyright DONTA Tracy. Licensed by the author for use in [facility]; reprinted with permission (jose c@.[facility]). All rights reserved.      Do you have sleep apnea, excessive snoring or daytime drowsiness?: no    Reviewed and updated as " needed this visit by clinical staff   Tobacco  Allergies  Meds  Problems  Med Hx  Surg Hx  Fam Hx          Reviewed and updated as needed this visit by Provider                 Social History     Tobacco Use     Smoking status: Never     Smokeless tobacco: Never   Substance Use Topics     Alcohol use: Not Currently     Comment: 1-2x in 6 months         Alcohol Use 10/31/2022   Prescreen: >3 drinks/day or >7 drinks/week? Not Applicable   Prescreen: >3 drinks/day or >7 drinks/week? -           PROBLEMS TO ADD ON...    Concern for soft stools. Possible Metformin related.   Has h/o HTN. on medical treatment. BP has been controlled. No side effects from medications. No CP, HA, dizziness. good compliance with medications and low salt diet.  Has h/o ischemic heart disease, asymptomatic regarding chest pains, SOB,palpitations. Has good compliance with treatment, diet and exercise.  Has H/O DM. On diet , exercise and oral treatment. Blood sugars are controlled. No parestesias. No hypoglycemias.  Has H/O hyperlipidemia. On medical treatment and diet. No side effects. No muscle weakness, myalgias or upset stomach.   Has H/O BPH. On treatment with Proscar . Has mild  symptoms of frequency, decreased urinary stream , no dysuria. No side effects from medications.      Current providers sharing in care for this patient include:   Patient Care Team:  Alvaro Lindo MD as PCP - General (Internal Medicine)  Alvaro Lindo MD as Assigned PCP  Hector Hall MD as MD (Cardiology)  Flavio Garcia MD as Assigned Heart and Vascular Provider    The following health maintenance items are reviewed in Epic and correct as of today:  Health Maintenance   Topic Date Due     HF ACTION PLAN  Never done     ZOSTER IMMUNIZATION (2 of 3) 08/11/2009     Pneumococcal Vaccine: 65+ Years (3 - PPSV23 if available, else PCV20) 02/22/2017     EYE EXAM  07/01/2020     ALT  05/28/2022     COVID-19 Vaccine (5 - Booster for Pfizer  "series) 07/12/2022     INFLUENZA VACCINE (1) 09/01/2022     LIPID  10/22/2022     MICROALBUMIN  10/22/2022     MEDICARE ANNUAL WELLNESS VISIT  10/22/2022     HEMOGLOBIN  12/27/2022     A1C  11/17/2022     CBC  12/27/2022     BMP  04/03/2023     DIABETIC FOOT EXAM  05/17/2023     ANNUAL REVIEW OF HM ORDERS  05/17/2023     FALL RISK ASSESSMENT  10/31/2023     ADVANCE CARE PLANNING  10/31/2027     DTAP/TDAP/TD IMMUNIZATION (3 - Td or Tdap) 10/10/2028     TSH W/FREE T4 REFLEX  Completed     HEPATITIS C SCREENING  Completed     PHQ-2 (once per calendar year)  Completed     URINALYSIS  Completed     IPV IMMUNIZATION  Aged Out     MENINGITIS IMMUNIZATION  Aged Out     COLORECTAL CANCER SCREENING  Discontinued     Lab work is in process  Labs reviewed in EPIC          Review of Systems   Genitourinary: Negative for impotence and penile discharge.     Constitutional, HEENT, cardiovascular, pulmonary, GI, , musculoskeletal, neuro, skin, endocrine and psych systems are negative, except as otherwise noted.    OBJECTIVE:   /82 (BP Location: Left arm, Cuff Size: Adult Regular)   Pulse 69   Temp 97.4  F (36.3  C) (Tympanic)   Resp 14   Ht 1.727 m (5' 8\")   Wt 71.4 kg (157 lb 4.8 oz)   SpO2 99%   BMI 23.92 kg/m   Estimated body mass index is 23.92 kg/m  as calculated from the following:    Height as of this encounter: 1.727 m (5' 8\").    Weight as of this encounter: 71.4 kg (157 lb 4.8 oz).  Physical Exam  GENERAL: healthy, alert and no distress  EYES: Eyes grossly normal to inspection, PERRL and conjunctivae and sclerae normal  HENT: ear canals and TM's normal, nose and mouth without ulcers or lesions  NECK: no adenopathy, no asymmetry, masses, or scars and thyroid normal to palpation  RESP: lungs clear to auscultation - no rales, rhonchi or wheezes  CV: regular rate and rhythm, normal S1 S2, no S3 or S4, no murmur, click or rub, no peripheral edema and peripheral pulses strong  ABDOMEN: soft, nontender, no " hepatosplenomegaly, no masses and bowel sounds normal  MS: no gross musculoskeletal defects noted, no edema  SKIN: no suspicious lesions or rashes  NEURO: Normal strength and tone, mentation intact and speech normal  PSYCH: mentation appears normal, affect normal/bright    Diagnostic Test Results:  Labs reviewed in Epic    ASSESSMENT / PLAN:       ICD-10-CM    1. Encounter for preventative adult health care examination  Z00.00       2. Hyperlipidemia with target LDL less than 70  E78.5 AST     ALT     OFFICE/OUTPT VISIT,EST,LEVL III      3. Screening for hyperlipidemia  Z13.220 Lipid panel reflex to direct LDL Non-fasting      4. Coronary artery disease involving native coronary artery of native heart without angina pectoris  I25.10 Lipid panel reflex to direct LDL Non-fasting      5. Stage 3b chronic kidney disease (H)  N18.32 Albumin Random Urine Quantitative with Creat Ratio      6. Type 2 diabetes mellitus with complication, without long-term current use of insulin (H)  E11.8 glipiZIDE (GLIPIZIDE XL) 10 MG 24 hr tablet     Hemoglobin A1c     OFFICE/OUTPT VISIT,EST,LEVL III      7. Benign prostatic hyperplasia with urinary frequency  N40.1 finasteride (PROSCAR) 5 MG tablet    R35.0 OFFICE/OUTPT VISIT,EST,LEVL III      8. Hypertension goal BP (blood pressure) < 140/90  I10 CBC with platelets     OFFICE/OUTPT VISIT,EST,LEVL III      9. Screening for prostate cancer  Z12.5 PSA, screen      assess lab work   Continue treatment   Controled HTN,   Assess DM   Medications refilled     Patient has been advised of split billing requirements and indicates understanding: Yes      COUNSELING:  Reviewed preventive health counseling, as reflected in patient instructions       Regular exercise       Healthy diet/nutrition       Vision screening       Hearing screening       Dental care       Colon cancer screening       Prostate cancer screening    Estimated body mass index is 23.92 kg/m  as calculated from the following:     "Height as of this encounter: 1.727 m (5' 8\").    Weight as of this encounter: 71.4 kg (157 lb 4.8 oz).        He reports that he has never smoked. He has never used smokeless tobacco.      Appropriate preventive services were discussed with this patient, including applicable screening as appropriate for cardiovascular disease, diabetes, osteopenia/osteoporosis, and glaucoma.  As appropriate for age/gender, discussed screening for colorectal cancer, prostate cancer, breast cancer, and cervical cancer. Checklist reviewing preventive services available has been given to the patient.    Reviewed patients plan of care and provided an AVS. The Intermediate Care Plan ( asthma action plan, low back pain action plan, and migraine action plan) for Adal meets the Care Plan requirement. This Care Plan has been established and reviewed with the Patient.    Counseling Resources:  ATP IV Guidelines  Pooled Cohorts Equation Calculator  Breast Cancer Risk Calculator  Breast Cancer: Medication to Reduce Risk  FRAX Risk Assessment  ICSI Preventive Guidelines  Dietary Guidelines for Americans, 2010  [employer]'s MyPlate  ASA Prophylaxis  Lung CA Screening    Alvaro Lindo MD  [facility]    Identified Health Risks:  "

## 2022-10-31 NOTE — LETTER
November 2, 2022      Adal Bates  [address] [facility] [address]        Dear ,    We are writing to inform you of your test results.  Your diabetic control is controlled, but still sub optimal. Keep diet and exercise, recheck in 6 months.       Resulted Orders   Lipid panel reflex to direct LDL Non-fasting   Result Value Ref Range    Cholesterol 101 <200 mg/dL    Triglycerides 78 <150 mg/dL    Direct Measure HDL 33 (L) >=40 mg/dL    LDL Cholesterol Calculated 52 <=100 mg/dL    Non HDL Cholesterol 68 <130 mg/dL    Patient Fasting > 8hrs? No     Narrative    Cholesterol  Desirable:  <200 mg/dL    Triglycerides  Normal:  Less than 150 mg/dL  Borderline High:  150-199 mg/dL  High:  200-499 mg/dL  Very High:  Greater than or equal to 500 mg/dL    Direct Measure HDL  Female:  Greater than or equal to 50 mg/dL   Male:  Greater than or equal to 40 mg/dL    LDL Cholesterol  Desirable:  <100mg/dL  Above Desirable:  100-129 mg/dL   Borderline High:  130-159 mg/dL   High:  160-189 mg/dL   Very High:  >= 190 mg/dL    Non HDL Cholesterol  Desirable:  130 mg/dL  Above Desirable:  130-159 mg/dL  Borderline High:  160-189 mg/dL  High:  190-219 mg/dL  Very High:  Greater than or equal to 220 mg/dL   Albumin Random Urine Quantitative with Creat Ratio   Result Value Ref Range    Creatinine Urine mg/dL 33 mg/dL    Albumin Urine mg/L 79 mg/L    Albumin Urine mg/g Cr 239.39 (H) 0.00 - 17.00 mg/g Cr   CBC with platelets   Result Value Ref Range    WBC Count 7.2 4.0 - 11.0 10e3/uL    RBC Count 5.23 4.40 - 5.90 10e6/uL    Hemoglobin 15.0 13.3 - 17.7 g/dL    Hematocrit 46.8 40.0 - 53.0 %    MCV 90 78 - 100 fL    MCH 28.7 26.5 - 33.0 pg    MCHC 32.1 31.5 - 36.5 g/dL    RDW 13.7 10.0 - 15.0 %    Platelet Count 195 150 - 450 10e3/uL   AST   Result Value Ref Range    AST 18 0 - 45 U/L   ALT   Result Value Ref Range    ALT 21 0 - 70 U/L   PSA, screen   Result Value Ref Range    Prostate Specific Antigen Screen 0.79 0.00 - 4.00  ug/L   Hemoglobin A1c   Result Value Ref Range    Hemoglobin A1C 7.2 (H) 0.0 - 5.6 %      Comment:      Normal <5.7%   Prediabetes 5.7-6.4%    Diabetes 6.5% or higher     Note: Adopted from ADA consensus guidelines.       If you have any questions or concerns, please call the clinic at the number listed above.       Sincerely,      Alvaro Lindo MD    kyle

## 2022-11-01 LAB
ALT SERPL W P-5'-P-CCNC: 21 U/L (ref 0–70)
AST SERPL W P-5'-P-CCNC: 18 U/L (ref 0–45)
CHOLEST SERPL-MCNC: 101 MG/DL
CREAT UR-MCNC: 33 MG/DL
FASTING STATUS PATIENT QL REPORTED: NO
HDLC SERPL-MCNC: 33 MG/DL
LDLC SERPL CALC-MCNC: 52 MG/DL
MICROALBUMIN UR-MCNC: 79 MG/L
MICROALBUMIN/CREAT UR: 239.39 MG/G CR (ref 0–17)
NONHDLC SERPL-MCNC: 68 MG/DL
PSA SERPL-MCNC: 0.79 UG/L (ref 0–4)
TRIGL SERPL-MCNC: 78 MG/DL
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
## 2022-11-28 DIAGNOSIS — E11.8 TYPE 2 DIABETES MELLITUS WITH COMPLICATION, WITHOUT LONG-TERM CURRENT USE OF INSULIN (H): ICD-10-CM

## 2022-11-28 RX ORDER — METFORMIN HCL 500 MG
TABLET, EXTENDED RELEASE 24 HR ORAL
Qty: 360 TABLET | Refills: 0 | Status: SHIPPED | OUTPATIENT
Start: 2022-11-28 | End: 2022-12-14

## 2022-11-28 NOTE — TELEPHONE ENCOUNTER
Routing refill request to provider for review/approval because:  Patient's CR is NOT>1.4 OR Patient's EGFR is NOT<45 within past 12 mos    Ryanne REYEZ RN, BSN

## 2022-11-30 RX ORDER — METFORMIN HCL 500 MG
TABLET, EXTENDED RELEASE 24 HR ORAL
Qty: 360 TABLET | Refills: 0 | OUTPATIENT
Start: 2022-11-30

## 2022-11-30 NOTE — TELEPHONE ENCOUNTER
This was sent 2 days ago  E-Prescribing Status: Receipt confirmed by pharmacy (11/28/2022  6:00 PM CST)  Ryanne REYEZ RN, BSN

## 2022-12-07 DIAGNOSIS — Z95.1 S/P CABG (CORONARY ARTERY BYPASS GRAFT): ICD-10-CM

## 2022-12-07 RX ORDER — METOPROLOL SUCCINATE 25 MG/1
TABLET, EXTENDED RELEASE ORAL
Qty: 360 TABLET | Refills: 2 | Status: SHIPPED | OUTPATIENT
Start: 2022-12-07 | End: 2023-08-11

## 2022-12-13 DIAGNOSIS — E11.8 TYPE 2 DIABETES MELLITUS WITH COMPLICATION, WITHOUT LONG-TERM CURRENT USE OF INSULIN (H): ICD-10-CM

## 2022-12-14 RX ORDER — METFORMIN HCL 500 MG
TABLET, EXTENDED RELEASE 24 HR ORAL
Qty: 360 TABLET | Refills: 1 | Status: SHIPPED | OUTPATIENT
Start: 2022-12-14 | End: 2023-07-10

## 2022-12-14 NOTE — TELEPHONE ENCOUNTER
Provider approval needed.   Pharmacy did not receive the Rx from Nov. 28     Creatinine   Date Value Ref Range Status   10/03/2022 1.66 (H) 0.67 - 1.17 mg/dL Final   05/28/2021 1.43 (H) 0.66 - 1.25 mg/dL Final

## 2022-12-15 ENCOUNTER — ANCILLARY PROCEDURE (OUTPATIENT)
Dept: CARDIOLOGY | Facility: CLINIC | Age: 77
End: 2022-12-15
Attending: INTERNAL MEDICINE
Payer: COMMERCIAL

## 2022-12-15 DIAGNOSIS — I25.5 ISCHEMIC CARDIOMYOPATHY: ICD-10-CM

## 2022-12-15 DIAGNOSIS — I44.7 LEFT BUNDLE BRANCH BLOCK (LBBB): ICD-10-CM

## 2022-12-15 PROCEDURE — 93295 DEV INTERROG REMOTE 1/2/MLT: CPT | Performed by: INTERNAL MEDICINE

## 2022-12-15 PROCEDURE — 93296 REM INTERROG EVL PM/IDS: CPT | Performed by: INTERNAL MEDICINE

## 2022-12-20 LAB
MDC_IDC_EPISODE_DTM: NORMAL
MDC_IDC_EPISODE_ID: NORMAL
MDC_IDC_EPISODE_TYPE: NORMAL
MDC_IDC_LEAD_IMPLANT_DT: NORMAL
MDC_IDC_LEAD_LOCATION: NORMAL
MDC_IDC_LEAD_LOCATION_DETAIL_1: NORMAL
MDC_IDC_LEAD_MFG: NORMAL
MDC_IDC_LEAD_MODEL: NORMAL
MDC_IDC_LEAD_POLARITY_TYPE: NORMAL
MDC_IDC_LEAD_SERIAL: NORMAL
MDC_IDC_MSMT_BATTERY_DTM: NORMAL
MDC_IDC_MSMT_BATTERY_REMAINING_LONGEVITY: 132 MO
MDC_IDC_MSMT_BATTERY_REMAINING_PERCENTAGE: 100 %
MDC_IDC_MSMT_BATTERY_STATUS: NORMAL
MDC_IDC_MSMT_CAP_CHARGE_DTM: NORMAL
MDC_IDC_MSMT_CAP_CHARGE_TIME: 9.5 S
MDC_IDC_MSMT_CAP_CHARGE_TYPE: NORMAL
MDC_IDC_MSMT_LEADCHNL_LV_IMPEDANCE_VALUE: 1474 OHM
MDC_IDC_MSMT_LEADCHNL_LV_PACING_THRESHOLD_AMPLITUDE: 1 V
MDC_IDC_MSMT_LEADCHNL_LV_PACING_THRESHOLD_PULSEWIDTH: 0.5 MS
MDC_IDC_MSMT_LEADCHNL_RA_IMPEDANCE_VALUE: 659 OHM
MDC_IDC_MSMT_LEADCHNL_RA_PACING_THRESHOLD_AMPLITUDE: 0.6 V
MDC_IDC_MSMT_LEADCHNL_RA_PACING_THRESHOLD_PULSEWIDTH: 0.4 MS
MDC_IDC_MSMT_LEADCHNL_RV_IMPEDANCE_VALUE: 463 OHM
MDC_IDC_MSMT_LEADCHNL_RV_PACING_THRESHOLD_AMPLITUDE: 1.7 V
MDC_IDC_MSMT_LEADCHNL_RV_PACING_THRESHOLD_PULSEWIDTH: 0.4 MS
MDC_IDC_PG_IMPLANT_DTM: NORMAL
MDC_IDC_PG_MFG: NORMAL
MDC_IDC_PG_MODEL: NORMAL
MDC_IDC_PG_SERIAL: NORMAL
MDC_IDC_PG_TYPE: NORMAL
MDC_IDC_SESS_CLINIC_NAME: NORMAL
MDC_IDC_SESS_DTM: NORMAL
MDC_IDC_SESS_TYPE: NORMAL
MDC_IDC_SET_BRADY_AT_MODE_SWITCH_MODE: NORMAL
MDC_IDC_SET_BRADY_AT_MODE_SWITCH_RATE: 170 {BEATS}/MIN
MDC_IDC_SET_BRADY_LOWRATE: 60 {BEATS}/MIN
MDC_IDC_SET_BRADY_MAX_SENSOR_RATE: 130 {BEATS}/MIN
MDC_IDC_SET_BRADY_MAX_TRACKING_RATE: 130 {BEATS}/MIN
MDC_IDC_SET_BRADY_MODE: NORMAL
MDC_IDC_SET_BRADY_PAV_DELAY_LOW: 180 MS
MDC_IDC_SET_BRADY_SAV_DELAY_LOW: 80 MS
MDC_IDC_SET_CRT_PACED_CHAMBERS: NORMAL
MDC_IDC_SET_LEADCHNL_LV_PACING_AMPLITUDE: 2.3 V
MDC_IDC_SET_LEADCHNL_LV_PACING_ANODE_ELECTRODE_1: NORMAL
MDC_IDC_SET_LEADCHNL_LV_PACING_ANODE_LOCATION_1: NORMAL
MDC_IDC_SET_LEADCHNL_LV_PACING_CAPTURE_MODE: NORMAL
MDC_IDC_SET_LEADCHNL_LV_PACING_CATHODE_ELECTRODE_1: NORMAL
MDC_IDC_SET_LEADCHNL_LV_PACING_CATHODE_LOCATION_1: NORMAL
MDC_IDC_SET_LEADCHNL_LV_PACING_PULSEWIDTH: 0.5 MS
MDC_IDC_SET_LEADCHNL_LV_SENSING_ADAPTATION_MODE: NORMAL
MDC_IDC_SET_LEADCHNL_LV_SENSING_ANODE_ELECTRODE_1: NORMAL
MDC_IDC_SET_LEADCHNL_LV_SENSING_ANODE_LOCATION_1: NORMAL
MDC_IDC_SET_LEADCHNL_LV_SENSING_CATHODE_ELECTRODE_1: NORMAL
MDC_IDC_SET_LEADCHNL_LV_SENSING_CATHODE_LOCATION_1: NORMAL
MDC_IDC_SET_LEADCHNL_LV_SENSING_SENSITIVITY: 1 MV
MDC_IDC_SET_LEADCHNL_RA_PACING_AMPLITUDE: 2 V
MDC_IDC_SET_LEADCHNL_RA_PACING_CAPTURE_MODE: NORMAL
MDC_IDC_SET_LEADCHNL_RA_PACING_POLARITY: NORMAL
MDC_IDC_SET_LEADCHNL_RA_PACING_PULSEWIDTH: 0.4 MS
MDC_IDC_SET_LEADCHNL_RA_SENSING_ADAPTATION_MODE: NORMAL
MDC_IDC_SET_LEADCHNL_RA_SENSING_POLARITY: NORMAL
MDC_IDC_SET_LEADCHNL_RA_SENSING_SENSITIVITY: 0.25 MV
MDC_IDC_SET_LEADCHNL_RV_PACING_AMPLITUDE: 3.4 V
MDC_IDC_SET_LEADCHNL_RV_PACING_CAPTURE_MODE: NORMAL
MDC_IDC_SET_LEADCHNL_RV_PACING_POLARITY: NORMAL
MDC_IDC_SET_LEADCHNL_RV_PACING_PULSEWIDTH: 0.4 MS
MDC_IDC_SET_LEADCHNL_RV_SENSING_ADAPTATION_MODE: NORMAL
MDC_IDC_SET_LEADCHNL_RV_SENSING_POLARITY: NORMAL
MDC_IDC_SET_LEADCHNL_RV_SENSING_SENSITIVITY: 0.6 MV
MDC_IDC_SET_ZONE_DETECTION_INTERVAL: 250 MS
MDC_IDC_SET_ZONE_DETECTION_INTERVAL: 300 MS
MDC_IDC_SET_ZONE_DETECTION_INTERVAL: 353 MS
MDC_IDC_SET_ZONE_TYPE: NORMAL
MDC_IDC_SET_ZONE_VENDOR_TYPE: NORMAL
MDC_IDC_STAT_AT_BURDEN_PERCENT: 1 %
MDC_IDC_STAT_AT_DTM_END: NORMAL
MDC_IDC_STAT_AT_DTM_START: NORMAL
MDC_IDC_STAT_BRADY_DTM_END: NORMAL
MDC_IDC_STAT_BRADY_DTM_START: NORMAL
MDC_IDC_STAT_BRADY_RA_PERCENT_PACED: 3 %
MDC_IDC_STAT_BRADY_RV_PERCENT_PACED: 65 %
MDC_IDC_STAT_CRT_DTM_END: NORMAL
MDC_IDC_STAT_CRT_DTM_START: NORMAL
MDC_IDC_STAT_CRT_LV_PERCENT_PACED: 100 %
MDC_IDC_STAT_EPISODE_RECENT_COUNT: 0
MDC_IDC_STAT_EPISODE_RECENT_COUNT: 1
MDC_IDC_STAT_EPISODE_RECENT_COUNT_DTM_END: NORMAL
MDC_IDC_STAT_EPISODE_RECENT_COUNT_DTM_START: NORMAL
MDC_IDC_STAT_EPISODE_TYPE: NORMAL
MDC_IDC_STAT_EPISODE_VENDOR_TYPE: NORMAL
MDC_IDC_STAT_TACHYTHERAPY_ATP_DELIVERED_RECENT: 0
MDC_IDC_STAT_TACHYTHERAPY_ATP_DELIVERED_TOTAL: 0
MDC_IDC_STAT_TACHYTHERAPY_RECENT_DTM_END: NORMAL
MDC_IDC_STAT_TACHYTHERAPY_RECENT_DTM_START: NORMAL
MDC_IDC_STAT_TACHYTHERAPY_SHOCKS_ABORTED_RECENT: 0
MDC_IDC_STAT_TACHYTHERAPY_SHOCKS_ABORTED_TOTAL: 0
MDC_IDC_STAT_TACHYTHERAPY_SHOCKS_DELIVERED_RECENT: 0
MDC_IDC_STAT_TACHYTHERAPY_SHOCKS_DELIVERED_TOTAL: 0
MDC_IDC_STAT_TACHYTHERAPY_TOTAL_DTM_END: NORMAL
MDC_IDC_STAT_TACHYTHERAPY_TOTAL_DTM_START: NORMAL

## 2023-03-21 ENCOUNTER — ANCILLARY PROCEDURE (OUTPATIENT)
Dept: CARDIOLOGY | Facility: CLINIC | Age: 78
End: 2023-03-21
Attending: INTERNAL MEDICINE
Payer: COMMERCIAL

## 2023-03-21 DIAGNOSIS — I25.5 ISCHEMIC CARDIOMYOPATHY: ICD-10-CM

## 2023-03-21 DIAGNOSIS — I44.7 LEFT BUNDLE BRANCH BLOCK (LBBB): ICD-10-CM

## 2023-03-21 PROCEDURE — 93295 DEV INTERROG REMOTE 1/2/MLT: CPT | Performed by: INTERNAL MEDICINE

## 2023-03-21 PROCEDURE — 93296 REM INTERROG EVL PM/IDS: CPT | Performed by: INTERNAL MEDICINE

## 2023-03-24 LAB
MDC_IDC_EPISODE_DTM: NORMAL
MDC_IDC_EPISODE_DURATION: 1 S
MDC_IDC_EPISODE_DURATION: 105 S
MDC_IDC_EPISODE_ID: NORMAL
MDC_IDC_EPISODE_TYPE: NORMAL
MDC_IDC_LEAD_IMPLANT_DT: NORMAL
MDC_IDC_LEAD_LOCATION: NORMAL
MDC_IDC_LEAD_LOCATION_DETAIL_1: NORMAL
MDC_IDC_LEAD_MFG: NORMAL
MDC_IDC_LEAD_MODEL: NORMAL
MDC_IDC_LEAD_POLARITY_TYPE: NORMAL
MDC_IDC_LEAD_SERIAL: NORMAL
MDC_IDC_MSMT_BATTERY_DTM: NORMAL
MDC_IDC_MSMT_BATTERY_REMAINING_LONGEVITY: 132 MO
MDC_IDC_MSMT_BATTERY_REMAINING_PERCENTAGE: 100 %
MDC_IDC_MSMT_BATTERY_STATUS: NORMAL
MDC_IDC_MSMT_CAP_CHARGE_DTM: NORMAL
MDC_IDC_MSMT_CAP_CHARGE_TIME: 9.7 S
MDC_IDC_MSMT_CAP_CHARGE_TYPE: NORMAL
MDC_IDC_MSMT_LEADCHNL_LV_IMPEDANCE_VALUE: 1479 OHM
MDC_IDC_MSMT_LEADCHNL_LV_PACING_THRESHOLD_AMPLITUDE: 1.1 V
MDC_IDC_MSMT_LEADCHNL_LV_PACING_THRESHOLD_PULSEWIDTH: 0.5 MS
MDC_IDC_MSMT_LEADCHNL_RA_IMPEDANCE_VALUE: 666 OHM
MDC_IDC_MSMT_LEADCHNL_RA_PACING_THRESHOLD_AMPLITUDE: 0.6 V
MDC_IDC_MSMT_LEADCHNL_RA_PACING_THRESHOLD_PULSEWIDTH: 0.4 MS
MDC_IDC_MSMT_LEADCHNL_RV_IMPEDANCE_VALUE: 484 OHM
MDC_IDC_PG_IMPLANT_DTM: NORMAL
MDC_IDC_PG_MFG: NORMAL
MDC_IDC_PG_MODEL: NORMAL
MDC_IDC_PG_SERIAL: NORMAL
MDC_IDC_PG_TYPE: NORMAL
MDC_IDC_SESS_CLINIC_NAME: NORMAL
MDC_IDC_SESS_DTM: NORMAL
MDC_IDC_SESS_TYPE: NORMAL
MDC_IDC_SET_BRADY_AT_MODE_SWITCH_MODE: NORMAL
MDC_IDC_SET_BRADY_AT_MODE_SWITCH_RATE: 170 {BEATS}/MIN
MDC_IDC_SET_BRADY_LOWRATE: 60 {BEATS}/MIN
MDC_IDC_SET_BRADY_MAX_SENSOR_RATE: 130 {BEATS}/MIN
MDC_IDC_SET_BRADY_MAX_TRACKING_RATE: 130 {BEATS}/MIN
MDC_IDC_SET_BRADY_MODE: NORMAL
MDC_IDC_SET_BRADY_PAV_DELAY_LOW: 180 MS
MDC_IDC_SET_BRADY_SAV_DELAY_LOW: 80 MS
MDC_IDC_SET_CRT_PACED_CHAMBERS: NORMAL
MDC_IDC_SET_LEADCHNL_LV_PACING_AMPLITUDE: 2.2 V
MDC_IDC_SET_LEADCHNL_LV_PACING_ANODE_ELECTRODE_1: NORMAL
MDC_IDC_SET_LEADCHNL_LV_PACING_ANODE_LOCATION_1: NORMAL
MDC_IDC_SET_LEADCHNL_LV_PACING_CAPTURE_MODE: NORMAL
MDC_IDC_SET_LEADCHNL_LV_PACING_CATHODE_ELECTRODE_1: NORMAL
MDC_IDC_SET_LEADCHNL_LV_PACING_CATHODE_LOCATION_1: NORMAL
MDC_IDC_SET_LEADCHNL_LV_PACING_PULSEWIDTH: 0.5 MS
MDC_IDC_SET_LEADCHNL_LV_SENSING_ADAPTATION_MODE: NORMAL
MDC_IDC_SET_LEADCHNL_LV_SENSING_ANODE_ELECTRODE_1: NORMAL
MDC_IDC_SET_LEADCHNL_LV_SENSING_ANODE_LOCATION_1: NORMAL
MDC_IDC_SET_LEADCHNL_LV_SENSING_CATHODE_ELECTRODE_1: NORMAL
MDC_IDC_SET_LEADCHNL_LV_SENSING_CATHODE_LOCATION_1: NORMAL
MDC_IDC_SET_LEADCHNL_LV_SENSING_SENSITIVITY: 1 MV
MDC_IDC_SET_LEADCHNL_RA_PACING_AMPLITUDE: 2 V
MDC_IDC_SET_LEADCHNL_RA_PACING_CAPTURE_MODE: NORMAL
MDC_IDC_SET_LEADCHNL_RA_PACING_POLARITY: NORMAL
MDC_IDC_SET_LEADCHNL_RA_PACING_PULSEWIDTH: 0.4 MS
MDC_IDC_SET_LEADCHNL_RA_SENSING_ADAPTATION_MODE: NORMAL
MDC_IDC_SET_LEADCHNL_RA_SENSING_POLARITY: NORMAL
MDC_IDC_SET_LEADCHNL_RA_SENSING_SENSITIVITY: 0.25 MV
MDC_IDC_SET_LEADCHNL_RV_PACING_AMPLITUDE: 3.4 V
MDC_IDC_SET_LEADCHNL_RV_PACING_CAPTURE_MODE: NORMAL
MDC_IDC_SET_LEADCHNL_RV_PACING_POLARITY: NORMAL
MDC_IDC_SET_LEADCHNL_RV_PACING_PULSEWIDTH: 0.4 MS
MDC_IDC_SET_LEADCHNL_RV_SENSING_ADAPTATION_MODE: NORMAL
MDC_IDC_SET_LEADCHNL_RV_SENSING_POLARITY: NORMAL
MDC_IDC_SET_LEADCHNL_RV_SENSING_SENSITIVITY: 0.6 MV
MDC_IDC_SET_ZONE_DETECTION_INTERVAL: 250 MS
MDC_IDC_SET_ZONE_DETECTION_INTERVAL: 300 MS
MDC_IDC_SET_ZONE_DETECTION_INTERVAL: 353 MS
MDC_IDC_SET_ZONE_TYPE: NORMAL
MDC_IDC_SET_ZONE_VENDOR_TYPE: NORMAL
MDC_IDC_STAT_AT_BURDEN_PERCENT: 1 %
MDC_IDC_STAT_AT_DTM_END: NORMAL
MDC_IDC_STAT_AT_DTM_START: NORMAL
MDC_IDC_STAT_BRADY_DTM_END: NORMAL
MDC_IDC_STAT_BRADY_DTM_START: NORMAL
MDC_IDC_STAT_BRADY_RA_PERCENT_PACED: 3 %
MDC_IDC_STAT_BRADY_RV_PERCENT_PACED: 63 %
MDC_IDC_STAT_CRT_DTM_END: NORMAL
MDC_IDC_STAT_CRT_DTM_START: NORMAL
MDC_IDC_STAT_CRT_LV_PERCENT_PACED: 100 %
MDC_IDC_STAT_EPISODE_RECENT_COUNT: 0
MDC_IDC_STAT_EPISODE_RECENT_COUNT: 3
MDC_IDC_STAT_EPISODE_RECENT_COUNT_DTM_END: NORMAL
MDC_IDC_STAT_EPISODE_RECENT_COUNT_DTM_START: NORMAL
MDC_IDC_STAT_EPISODE_TYPE: NORMAL
MDC_IDC_STAT_EPISODE_VENDOR_TYPE: NORMAL
MDC_IDC_STAT_TACHYTHERAPY_ATP_DELIVERED_RECENT: 0
MDC_IDC_STAT_TACHYTHERAPY_ATP_DELIVERED_TOTAL: 0
MDC_IDC_STAT_TACHYTHERAPY_RECENT_DTM_END: NORMAL
MDC_IDC_STAT_TACHYTHERAPY_RECENT_DTM_START: NORMAL
MDC_IDC_STAT_TACHYTHERAPY_SHOCKS_ABORTED_RECENT: 0
MDC_IDC_STAT_TACHYTHERAPY_SHOCKS_ABORTED_TOTAL: 0
MDC_IDC_STAT_TACHYTHERAPY_SHOCKS_DELIVERED_RECENT: 0
MDC_IDC_STAT_TACHYTHERAPY_SHOCKS_DELIVERED_TOTAL: 0
MDC_IDC_STAT_TACHYTHERAPY_TOTAL_DTM_END: NORMAL
MDC_IDC_STAT_TACHYTHERAPY_TOTAL_DTM_START: NORMAL
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
## 2023-04-06 ENCOUNTER — OFFICE VISIT (OUTPATIENT)
Dept: CARDIOLOGY | Facility: CLINIC | Age: 78
End: 2023-04-06
Payer: COMMERCIAL

## 2023-04-06 ENCOUNTER — LAB (OUTPATIENT)
Dept: LAB | Facility: CLINIC | Age: 78
End: 2023-04-06
Payer: COMMERCIAL

## 2023-04-06 VITALS
OXYGEN SATURATION: 95 % | HEART RATE: 68 BPM | SYSTOLIC BLOOD PRESSURE: 132 MMHG | HEIGHT: 68 IN | DIASTOLIC BLOOD PRESSURE: 82 MMHG | BODY MASS INDEX: 24.38 KG/M2 | WEIGHT: 160.9 LBS

## 2023-04-06 DIAGNOSIS — I25.5 ISCHEMIC CARDIOMYOPATHY: ICD-10-CM

## 2023-04-06 DIAGNOSIS — I50.22 CHRONIC SYSTOLIC HEART FAILURE (H): ICD-10-CM

## 2023-04-06 LAB
ANION GAP SERPL CALCULATED.3IONS-SCNC: 10 MMOL/L (ref 7–15)
BUN SERPL-MCNC: 23 MG/DL (ref 8–23)
CALCIUM SERPL-MCNC: 9.5 MG/DL (ref 8.8–10.2)
CHLORIDE SERPL-SCNC: 103 MMOL/L (ref 98–107)
CREAT SERPL-MCNC: 1.67 MG/DL (ref 0.67–1.17)
DEPRECATED HCO3 PLAS-SCNC: 25 MMOL/L (ref 22–29)
GFR SERPL CREATININE-BSD FRML MDRD: 42 ML/MIN/1.73M2
GLUCOSE SERPL-MCNC: 316 MG/DL (ref 70–99)
POTASSIUM SERPL-SCNC: 4.6 MMOL/L (ref 3.4–5.3)
SODIUM SERPL-SCNC: 138 MMOL/L (ref 136–145)

## 2023-04-06 PROCEDURE — 80048 BASIC METABOLIC PNL TOTAL CA: CPT | Performed by: INTERNAL MEDICINE

## 2023-04-06 PROCEDURE — 99214 OFFICE O/P EST MOD 30 MIN: CPT | Performed by: PHYSICIAN ASSISTANT

## 2023-04-06 PROCEDURE — 36415 COLL VENOUS BLD VENIPUNCTURE: CPT | Performed by: INTERNAL MEDICINE

## 2023-04-06 NOTE — PATIENT INSTRUCTIONS
Call CORE nurse for any questions or concerns Mon-Fri 8am-4pm:                                                 #[phone]                                       For concerns after hours:                                               #[phone]    YOU ARE DOING WELL. NO MEDICATION CHANGES TODAY. FOLLOW UP WITH DR. TIDWELL IN 6 MONTHS.      Component      Latest Ref Rng 4/6/2023   Sodium      136 - 145 mmol/L 138    Potassium      3.4 - 5.3 mmol/L 4.6    Chloride      98 - 107 mmol/L 103    Carbon Dioxide (CO2)      22 - 29 mmol/L 25    Anion Gap      7 - 15 mmol/L 10    Urea Nitrogen      8.0 - 23.0 mg/dL 23.0    Creatinine      0.67 - 1.17 mg/dL 1.67 (H)    Calcium      8.8 - 10.2 mg/dL 9.5    Glucose      70 - 99 mg/dL 316 (H)    GFR Estimate      >60 mL/min/1.73m2 42 (L)       Legend:  (H) High  (L) Low
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
## 2023-05-16 DIAGNOSIS — I25.5 ISCHEMIC CARDIOMYOPATHY: ICD-10-CM

## 2023-05-16 RX ORDER — ATORVASTATIN CALCIUM 40 MG/1
TABLET, FILM COATED ORAL
Qty: 90 TABLET | Refills: 1 | Status: SHIPPED | OUTPATIENT
Start: 2023-05-16 | End: 2023-11-02

## 2023-06-02 ENCOUNTER — HEALTH MAINTENANCE LETTER (OUTPATIENT)
Age: 78
End: 2023-06-02

## 2023-06-26 ENCOUNTER — ANCILLARY PROCEDURE (OUTPATIENT)
Dept: CARDIOLOGY | Facility: CLINIC | Age: 78
End: 2023-06-26
Attending: INTERNAL MEDICINE
Payer: COMMERCIAL

## 2023-06-26 DIAGNOSIS — I25.5 ISCHEMIC CARDIOMYOPATHY: ICD-10-CM

## 2023-06-26 DIAGNOSIS — I24.9 ACUTE CORONARY SYNDROME (H): Primary | ICD-10-CM

## 2023-06-26 DIAGNOSIS — I44.7 LEFT BUNDLE BRANCH BLOCK (LBBB): ICD-10-CM

## 2023-06-26 PROCEDURE — 93295 DEV INTERROG REMOTE 1/2/MLT: CPT | Performed by: INTERNAL MEDICINE

## 2023-06-26 PROCEDURE — 93296 REM INTERROG EVL PM/IDS: CPT | Performed by: INTERNAL MEDICINE

## 2023-06-27 DIAGNOSIS — I50.43 ACUTE ON CHRONIC COMBINED SYSTOLIC (CONGESTIVE) AND DIASTOLIC (CONGESTIVE) HEART FAILURE (H): ICD-10-CM

## 2023-06-27 DIAGNOSIS — I25.5 ISCHEMIC CARDIOMYOPATHY: ICD-10-CM

## 2023-06-28 RX ORDER — SPIRONOLACTONE 25 MG/1
25 TABLET ORAL DAILY
Qty: 90 TABLET | Refills: 3 | Status: SHIPPED | OUTPATIENT
Start: 2023-06-28 | End: 2024-06-11

## 2023-06-28 NOTE — TELEPHONE ENCOUNTER
Routing refill request to provider for review/approval because:  Labs out of range:  Creatinine  Creatinine   Date Value Ref Range Status   04/06/2023 1.67 (H) 0.67 - 1.17 mg/dL Final   05/28/2021 1.43 (H) 0.66 - 1.25 mg/dL Final     Gina Leonardo RN  [facility]

## 2023-07-03 LAB
MDC_IDC_EPISODE_DTM: NORMAL
MDC_IDC_EPISODE_ID: NORMAL
MDC_IDC_EPISODE_TYPE: NORMAL
MDC_IDC_LEAD_IMPLANT_DT: NORMAL
MDC_IDC_LEAD_LOCATION: NORMAL
MDC_IDC_LEAD_LOCATION_DETAIL_1: NORMAL
MDC_IDC_LEAD_MFG: NORMAL
MDC_IDC_LEAD_MODEL: NORMAL
MDC_IDC_LEAD_POLARITY_TYPE: NORMAL
MDC_IDC_LEAD_SERIAL: NORMAL
MDC_IDC_MSMT_BATTERY_DTM: NORMAL
MDC_IDC_MSMT_BATTERY_REMAINING_LONGEVITY: 132 MO
MDC_IDC_MSMT_BATTERY_REMAINING_PERCENTAGE: 100 %
MDC_IDC_MSMT_BATTERY_STATUS: NORMAL
MDC_IDC_MSMT_CAP_CHARGE_DTM: NORMAL
MDC_IDC_MSMT_CAP_CHARGE_TIME: 9.8 S
MDC_IDC_MSMT_CAP_CHARGE_TYPE: NORMAL
MDC_IDC_MSMT_LEADCHNL_LV_IMPEDANCE_VALUE: 1492 OHM
MDC_IDC_MSMT_LEADCHNL_LV_PACING_THRESHOLD_AMPLITUDE: 1.1 V
MDC_IDC_MSMT_LEADCHNL_LV_PACING_THRESHOLD_PULSEWIDTH: 0.5 MS
MDC_IDC_MSMT_LEADCHNL_RA_IMPEDANCE_VALUE: 678 OHM
MDC_IDC_MSMT_LEADCHNL_RA_PACING_THRESHOLD_AMPLITUDE: 0.5 V
MDC_IDC_MSMT_LEADCHNL_RA_PACING_THRESHOLD_PULSEWIDTH: 0.4 MS
MDC_IDC_MSMT_LEADCHNL_RV_IMPEDANCE_VALUE: 495 OHM
MDC_IDC_MSMT_LEADCHNL_RV_PACING_THRESHOLD_AMPLITUDE: 1.6 V
MDC_IDC_MSMT_LEADCHNL_RV_PACING_THRESHOLD_PULSEWIDTH: 0.4 MS
MDC_IDC_PG_IMPLANT_DTM: NORMAL
MDC_IDC_PG_MFG: NORMAL
MDC_IDC_PG_MODEL: NORMAL
MDC_IDC_PG_SERIAL: NORMAL
MDC_IDC_PG_TYPE: NORMAL
MDC_IDC_SESS_CLINIC_NAME: NORMAL
MDC_IDC_SESS_DTM: NORMAL
MDC_IDC_SESS_TYPE: NORMAL
MDC_IDC_SET_BRADY_AT_MODE_SWITCH_MODE: NORMAL
MDC_IDC_SET_BRADY_AT_MODE_SWITCH_RATE: 170 {BEATS}/MIN
MDC_IDC_SET_BRADY_LOWRATE: 60 {BEATS}/MIN
MDC_IDC_SET_BRADY_MAX_SENSOR_RATE: 130 {BEATS}/MIN
MDC_IDC_SET_BRADY_MAX_TRACKING_RATE: 130 {BEATS}/MIN
MDC_IDC_SET_BRADY_MODE: NORMAL
MDC_IDC_SET_BRADY_PAV_DELAY_LOW: 180 MS
MDC_IDC_SET_BRADY_SAV_DELAY_LOW: 80 MS
MDC_IDC_SET_CRT_PACED_CHAMBERS: NORMAL
MDC_IDC_SET_LEADCHNL_LV_PACING_AMPLITUDE: 2.3 V
MDC_IDC_SET_LEADCHNL_LV_PACING_ANODE_ELECTRODE_1: NORMAL
MDC_IDC_SET_LEADCHNL_LV_PACING_ANODE_LOCATION_1: NORMAL
MDC_IDC_SET_LEADCHNL_LV_PACING_CAPTURE_MODE: NORMAL
MDC_IDC_SET_LEADCHNL_LV_PACING_CATHODE_ELECTRODE_1: NORMAL
MDC_IDC_SET_LEADCHNL_LV_PACING_CATHODE_LOCATION_1: NORMAL
MDC_IDC_SET_LEADCHNL_LV_PACING_PULSEWIDTH: 0.5 MS
MDC_IDC_SET_LEADCHNL_LV_SENSING_ADAPTATION_MODE: NORMAL
MDC_IDC_SET_LEADCHNL_LV_SENSING_ANODE_ELECTRODE_1: NORMAL
MDC_IDC_SET_LEADCHNL_LV_SENSING_ANODE_LOCATION_1: NORMAL
MDC_IDC_SET_LEADCHNL_LV_SENSING_CATHODE_ELECTRODE_1: NORMAL
MDC_IDC_SET_LEADCHNL_LV_SENSING_CATHODE_LOCATION_1: NORMAL
MDC_IDC_SET_LEADCHNL_LV_SENSING_SENSITIVITY: 1 MV
MDC_IDC_SET_LEADCHNL_RA_PACING_AMPLITUDE: 2 V
MDC_IDC_SET_LEADCHNL_RA_PACING_CAPTURE_MODE: NORMAL
MDC_IDC_SET_LEADCHNL_RA_PACING_POLARITY: NORMAL
MDC_IDC_SET_LEADCHNL_RA_PACING_PULSEWIDTH: 0.4 MS
MDC_IDC_SET_LEADCHNL_RA_SENSING_ADAPTATION_MODE: NORMAL
MDC_IDC_SET_LEADCHNL_RA_SENSING_POLARITY: NORMAL
MDC_IDC_SET_LEADCHNL_RA_SENSING_SENSITIVITY: 0.25 MV
MDC_IDC_SET_LEADCHNL_RV_PACING_AMPLITUDE: 3.4 V
MDC_IDC_SET_LEADCHNL_RV_PACING_CAPTURE_MODE: NORMAL
MDC_IDC_SET_LEADCHNL_RV_PACING_POLARITY: NORMAL
MDC_IDC_SET_LEADCHNL_RV_PACING_PULSEWIDTH: 0.4 MS
MDC_IDC_SET_LEADCHNL_RV_SENSING_ADAPTATION_MODE: NORMAL
MDC_IDC_SET_LEADCHNL_RV_SENSING_POLARITY: NORMAL
MDC_IDC_SET_LEADCHNL_RV_SENSING_SENSITIVITY: 0.6 MV
MDC_IDC_SET_ZONE_DETECTION_INTERVAL: 250 MS
MDC_IDC_SET_ZONE_DETECTION_INTERVAL: 300 MS
MDC_IDC_SET_ZONE_DETECTION_INTERVAL: 353 MS
MDC_IDC_SET_ZONE_TYPE: NORMAL
MDC_IDC_SET_ZONE_VENDOR_TYPE: NORMAL
MDC_IDC_STAT_AT_BURDEN_PERCENT: 1 %
MDC_IDC_STAT_AT_DTM_END: NORMAL
MDC_IDC_STAT_AT_DTM_START: NORMAL
MDC_IDC_STAT_BRADY_DTM_END: NORMAL
MDC_IDC_STAT_BRADY_DTM_START: NORMAL
MDC_IDC_STAT_BRADY_RA_PERCENT_PACED: 3 %
MDC_IDC_STAT_BRADY_RV_PERCENT_PACED: 58 %
MDC_IDC_STAT_CRT_DTM_END: NORMAL
MDC_IDC_STAT_CRT_DTM_START: NORMAL
MDC_IDC_STAT_CRT_LV_PERCENT_PACED: 100 %
MDC_IDC_STAT_EPISODE_RECENT_COUNT: 0
MDC_IDC_STAT_EPISODE_RECENT_COUNT: 3
MDC_IDC_STAT_EPISODE_RECENT_COUNT_DTM_END: NORMAL
MDC_IDC_STAT_EPISODE_RECENT_COUNT_DTM_START: NORMAL
MDC_IDC_STAT_EPISODE_TYPE: NORMAL
MDC_IDC_STAT_EPISODE_VENDOR_TYPE: NORMAL
MDC_IDC_STAT_TACHYTHERAPY_ATP_DELIVERED_RECENT: 0
MDC_IDC_STAT_TACHYTHERAPY_ATP_DELIVERED_TOTAL: 0
MDC_IDC_STAT_TACHYTHERAPY_RECENT_DTM_END: NORMAL
MDC_IDC_STAT_TACHYTHERAPY_RECENT_DTM_START: NORMAL
MDC_IDC_STAT_TACHYTHERAPY_SHOCKS_ABORTED_RECENT: 0
MDC_IDC_STAT_TACHYTHERAPY_SHOCKS_ABORTED_TOTAL: 0
MDC_IDC_STAT_TACHYTHERAPY_SHOCKS_DELIVERED_RECENT: 0
MDC_IDC_STAT_TACHYTHERAPY_SHOCKS_DELIVERED_TOTAL: 0
MDC_IDC_STAT_TACHYTHERAPY_TOTAL_DTM_END: NORMAL
MDC_IDC_STAT_TACHYTHERAPY_TOTAL_DTM_START: NORMAL

## 2023-07-10 ENCOUNTER — OFFICE VISIT (OUTPATIENT)
Dept: INTERNAL MEDICINE | Facility: CLINIC | Age: 78
End: 2023-07-10
Payer: COMMERCIAL

## 2023-07-10 VITALS
DIASTOLIC BLOOD PRESSURE: 72 MMHG | BODY MASS INDEX: 24.34 KG/M2 | OXYGEN SATURATION: 100 % | HEART RATE: 73 BPM | RESPIRATION RATE: 16 BRPM | WEIGHT: 160.6 LBS | HEIGHT: 68 IN | SYSTOLIC BLOOD PRESSURE: 112 MMHG | TEMPERATURE: 97.3 F

## 2023-07-10 DIAGNOSIS — E78.5 HYPERLIPIDEMIA WITH TARGET LDL LESS THAN 70: Primary | ICD-10-CM

## 2023-07-10 DIAGNOSIS — Z23 NEED FOR SHINGLES VACCINE: ICD-10-CM

## 2023-07-10 DIAGNOSIS — E11.8 TYPE 2 DIABETES MELLITUS WITH COMPLICATION, WITHOUT LONG-TERM CURRENT USE OF INSULIN (H): ICD-10-CM

## 2023-07-10 DIAGNOSIS — N18.32 STAGE 3B CHRONIC KIDNEY DISEASE (H): ICD-10-CM

## 2023-07-10 DIAGNOSIS — M25.572 PAIN IN JOINT INVOLVING ANKLE AND FOOT, LEFT: ICD-10-CM

## 2023-07-10 DIAGNOSIS — M54.50 ACUTE LEFT-SIDED LOW BACK PAIN WITHOUT SCIATICA: ICD-10-CM

## 2023-07-10 DIAGNOSIS — I10 HYPERTENSION GOAL BP (BLOOD PRESSURE) < 140/90: ICD-10-CM

## 2023-07-10 DIAGNOSIS — I25.10 CORONARY ARTERY DISEASE INVOLVING NATIVE CORONARY ARTERY OF NATIVE HEART WITHOUT ANGINA PECTORIS: ICD-10-CM

## 2023-07-10 LAB — HBA1C MFR BLD: 9 % (ref 0–5.6)

## 2023-07-10 PROCEDURE — 99214 OFFICE O/P EST MOD 30 MIN: CPT | Performed by: INTERNAL MEDICINE

## 2023-07-10 PROCEDURE — 83036 HEMOGLOBIN GLYCOSYLATED A1C: CPT | Performed by: INTERNAL MEDICINE

## 2023-07-10 PROCEDURE — 36415 COLL VENOUS BLD VENIPUNCTURE: CPT | Performed by: INTERNAL MEDICINE

## 2023-07-10 RX ORDER — METFORMIN HCL 500 MG
TABLET, EXTENDED RELEASE 24 HR ORAL
Qty: 360 TABLET | Refills: 1 | Status: SHIPPED | OUTPATIENT
Start: 2023-07-10 | End: 2023-11-28

## 2023-07-10 ASSESSMENT — PAIN SCALES - GENERAL: PAINLEVEL: MILD PAIN (3)

## 2023-07-10 NOTE — PROGRESS NOTES
Assessment & Plan     Type 2 diabetes mellitus with complication, without long-term current use of insulin (H)  Assess lab work   Continue treatment   - HEMOGLOBIN A1C  - metFORMIN (GLUCOPHAGE XR) 500 MG 24 hr tablet; TAKE 2 TABLETS BY MOUTH TWICE DAILY WITH MEALS    Stage 3b chronic kidney disease (H)  Monitor renal function, stable     Hyperlipidemia with target LDL less than 70  Continue statin treatment     Hypertension goal BP (blood pressure) < 140/90  Controlled on medications     Coronary artery disease involving native coronary artery of native heart without angina pectoris  No anginal symptoms    Left LBP- advised for stretching and strengthening exercises    Left medial malleolar pain - normal exam, monitor                See Patient Instructions    Alvaro Lindo MD  [facility] ROJELIO Galeas is a 77 year old, presenting for the following health issues:  RECHECK and Musculoskeletal Problem (Pt states he has been having muscle spasms in left ankle since yesterday that happen about every 15 seconds)        7/10/2023    11:34 AM   Additional Questions   Roomed by Ruthie NEELY   Accompanied by wife     History of Present Illness       Diabetes:   He presents for follow up of diabetes.  He is checking home blood glucose a few times a week. He checks blood glucose before meals.  Blood glucose is never over 200 and never under 70. He is aware of hypoglycemia symptoms including weakness. He has no concerns regarding his diabetes at this time.  He is not experiencing numbness or burning in feet, excessive thirst, blurry vision, weight changes or redness, sores or blisters on feet. The patient has not had a diabetic eye exam in the last 12 months.         Hypertension: He presents for follow up of hypertension.  He does check blood pressure  regularly outside of the clinic. Outpatient blood pressures have not been over 140/90. He follows a low salt diet.     He eats 4 or more  "servings of fruits and vegetables daily.He consumes 0 sweetened beverage(s) daily.He exercises with enough effort to increase his heart rate 30 to 60 minutes per day.  He exercises with enough effort to increase his heart rate 4 days per week.   He is taking medications regularly.       Patient is seen for a follow up visit.  Concern for left medial foot pain last night, on and off, sharp pain lasting for seconds, not related to walking, no redness or swelling of the foot, no injury.   Has had left lateral and posterior hip pain with walking. For a week, after adjusting a new fridge. No leg radiation, no paresthesias or weakness.     Has h/o ischemic heart disease, asymptomatic regarding chest pains, SOB,palpitations. Has good compliance with treatment, diet and exercise.  Has h/o HTN. on medical treatment. BP has been controlled. No side effects from medications. No CP, HA, dizziness. good compliance with medications and low salt diet.  Has H/O DM. On diet , exercise and oral treatment. Blood sugars are controlled. No parestesias. No hypoglycemias.  Has H/O hyperlipidemia. On medical treatment and diet. No side effects. No muscle weakness, myalgias or upset stomach.           Review of Systems   Constitutional, HEENT, cardiovascular, pulmonary, gi and gu systems are negative, except as otherwise noted.      Objective    /72 (BP Location: Left arm, Cuff Size: Adult Regular)   Pulse 73   Temp 97.3  F (36.3  C) (Tympanic)   Resp 16   Ht 1.727 m (5' 8\")   Wt 72.8 kg (160 lb 9.6 oz)   SpO2 100%   BMI 24.42 kg/m    Body mass index is 24.42 kg/m .  Physical Exam   GENERAL: healthy, alert and no distress  EYES: Eyes grossly normal to inspection, PERRL and conjunctivae and sclerae normal  HENT: ear canals and TM's normal, nose and mouth without ulcers or lesions  NECK: no adenopathy, no asymmetry, masses, or scars and thyroid normal to palpation  RESP: lungs clear to auscultation - no rales, rhonchi or " wheezes  CV: regular rate and rhythm, normal S1 S2, no S3 or S4, no murmur, click or rub, no peripheral edema and peripheral pulses strong  ABDOMEN: soft, nontender, no hepatosplenomegaly, no masses and bowel sounds normal  MS: no gross musculoskeletal defects noted, no edema  SKIN: no suspicious lesions or rashes  NEURO: Normal strength and tone, mentation intact and speech normal  PSYCH: mentation appears normal, affect normal/bright    Ancillary Procedure on 06/26/2023   Component Date Value Ref Range Status     Date Time Interrogation Session 06/27/2023 [id number]   Final     Implantable Pulse Generator Manufa* 06/27/2023 [employer]   Final     Implantable Pulse Generator Model 06/27/2023 G447 RESONATE X4 CRT-D   Final     Implantable Pulse Generator Serial* 06/27/2023 [id number]   Final     Type Interrogation Session 06/27/2023 Remote   Final     Clinic Name 06/27/2023 [place]   Final     Implantable Pulse Generator Type 06/27/2023 Cardiac Resynchronization Therapy - Defibrillator   Final     Implantable Pulse Generator Implan* 06/27/2023 20211227   Final     Implantable Lead  06/27/2023 [employer]   Final     Implantable Lead Model 06/27/2023 0275 [employer] [place] 4-Site   Final     Implantable Lead Serial Number 06/27/2023 [id number]   Final     Implantable Lead Implant Date 06/27/2023 20211227   Final     Implantable Lead Polarity Type 06/27/2023 Tripolar Lead   Final     Implantable Lead Location Detail 1 06/27/2023 UNKNOWN   Final     Implantable Lead Location 06/27/2023 Right Ventricle   Final     Implantable Lead  06/27/2023 [employer]   Final     Implantable Lead Model 06/27/2023 4671 Acuity X4 Straight   Final     Implantable Lead Serial Number 06/27/2023 [id number]   Final     Implantable Lead Implant Date 06/27/2023 20211227   Final     Implantable Lead Polarity Type 06/27/2023 Quadripolar Lead   Final     Implantable Lead Location Detail 1 06/27/2023 UNKNOWN    Final     Implantable Lead Location 06/27/2023 Left Ventricle   Final     Implantable Lead  06/27/2023 [employer]   Final     Implantable Lead Model 06/27/2023 7840 Ingevity + MRI   Final     Implantable Lead Serial Number 06/27/2023 [id number]   Final     Implantable Lead Implant Date 06/27/2023 20211227   Final     Implantable Lead Polarity Type 06/27/2023 Bipolar Lead   Final     Implantable Lead Location Detail 1 06/27/2023 UNKNOWN   Final     Implantable Lead Location 06/27/2023 Right Atrium   Final     Ant Setting Mode (NBG Code) 06/27/2023 DDD   Final     Ant Setting Lower Rate Limit 06/27/2023 60  [beats]/min Final     Ant Setting Maximum Tracking Rate 06/27/2023 130  [beats]/min Final     Ant Setting Maximum Sensor Rate 06/27/2023 130  [beats]/min Final     Ant Setting MARCELO Delay Low 06/27/2023 80  ms Final     Ant Setting PAV Delay Low 06/27/2023 180  ms Final     Ant Setting AT Mode Switch Rate 06/27/2023 170  [beats]/min Final     Ant Setting AT Mode Switch Mode 06/27/2023 VDIR   Final     Lead Channel Setting Sensing Polar* 06/27/2023 Bipolar   Final     Lead Channel Setting Sensing Sensi* 06/27/2023 0.25  mV Final     Lead Channel Setting Sensing Adapt* 06/27/2023 Adaptive   Final     Lead Channel Setting Sensing Polar* 06/27/2023 Bipolar   Final     Lead Channel Setting Sensing Sensi* 06/27/2023 0.6  mV Final     Lead Channel Setting Sensing Adapt* 06/27/2023 Adaptive   Final     Lead Channel Setting Sensing Anode* 06/27/2023 Left Ventricle   Final     Lead Channel Setting Sensing Anode* 06/27/2023 Ring2   Final     Lead Channel Setting Sensing Catho* 06/27/2023 Left Ventricle   Final     Lead Channel Setting Sensing Catho* 06/27/2023 Tip   Final     Lead Channel Setting Sensing Sensi* 06/27/2023 1.0  mV Final     Lead Channel Setting Sensing Adapt* 06/27/2023 Adaptive   Final     Ventricular chambers paced during * 06/27/2023 LVOnly   Final     Lead Channel Setting  Pacing Polari* 06/27/2023 Bipolar   Final     Lead Channel Setting Pacing Pulse * 06/27/2023 0.4  ms Final     Lead Channel Setting Pacing Amplit* 06/27/2023 2.0  V Final     Lead Channel Setting Pacing Captur* 06/27/2023 Adaptive   Final     Lead Channel Setting Pacing Polari* 06/27/2023 Bipolar   Final     Lead Channel Setting Pacing Pulse * 06/27/2023 0.4  ms Final     Lead Channel Setting Pacing Amplit* 06/27/2023 3.4  V Final     Lead Channel Setting Pacing Captur* 06/27/2023 Adaptive   Final     Lead Channel Setting Pacing Anode * 06/27/2023 Left Ventricle   Final     Lead Channel Setting Pacing Anode * 06/27/2023 Ring2   Final     Lead Channel Setting Sensing Catho* 06/27/2023 Left Ventricle   Final     Lead Channel Setting Sensing Catho* 06/27/2023 Tip   Final     Lead Channel Setting Pacing Pulse * 06/27/2023 0.5  ms Final     Lead Channel Setting Pacing Amplit* 06/27/2023 2.3  V Final     Lead Channel Setting Pacing Captur* 06/27/2023 Adaptive   Final     Zone Setting Type Category 06/27/2023 VF   Final     Zone Setting Vendor Type Category 06/27/2023 VF   Final     Zone Setting Detection Interval 06/27/2023 250  ms Final     Zone Setting Type Category 06/27/2023 VT   Final     Zone Setting Vendor Type Category 06/27/2023 VT   Final     Zone Setting Detection Interval 06/27/2023 300  ms Final     Zone Setting Type Category 06/27/2023 VT   Final     Zone Setting Vendor Type Category 06/27/2023 VT-1   Final     Zone Setting Detection Interval 06/27/2023 353  ms Final     Lead Channel Impedance Value 06/27/2023 1,492  ohm Final     Lead Channel Pacing Threshold Ampl* 06/27/2023 1.1  V Final     Lead Channel Pacing Threshold Puls* 06/27/2023 0.5  ms Final     Lead Channel Impedance Value 06/27/2023 678  ohm Final     Lead Channel Pacing Threshold Ampl* 06/27/2023 0.5  V Final     Lead Channel Pacing Threshold Puls* 06/27/2023 0.4  ms Final     Lead Channel Impedance Value 06/27/2023 495  ohm Final     Lead  Channel Pacing Threshold Ampl* 06/27/2023 1.6  V Final     Lead Channel Pacing Threshold Puls* 06/27/2023 0.4  ms Final     Battery Date Time of Measurements 06/27/2023 20230626025100   Final     Battery Status 06/27/2023 Beginning of Service   Final     Battery Remaining Longevity 06/27/2023 132  mo Final     Battery Remaining Percentage 06/27/2023 100  % Final     Capacitor Charge Type 06/27/2023 Reformation   Final     Capacitor Last Charge Date Time 06/27/2023 20230621045600   Final     Capacitor Charge Time 06/27/2023 9.8  s Final     Ant Statistic Date Time Start 06/27/2023 84119861403210   Final     Ant Statistic Date Time End 06/27/2023 20230626000000   Final     Ant Statistic RA Percent Paced 06/27/2023 3  % Final     Ant Statistic RV Percent Paced 06/27/2023 58  % Final     CRT Statistic LV Percent Paced 06/27/2023 100  % Final     CRT Statistic Date Time Start 06/27/2023 20220608000000   Final     CRT Statistic Date Time End 06/27/2023 20230626000000   Final     Atrial Tachy Statistic Date Time S* 06/27/2023 20220626000000   Final     Atrial Tachy Statistic Date Time E* 06/27/2023 20230625000000   Final     Atrial Tachy Statistic AT/AF Burde* 06/27/2023 1  % Final     Therapy Statistic Recent Shocks De* 06/27/2023 0   Final     Therapy Statistic Recent Shocks Ab* 06/27/2023 0   Final     Therapy Statistic Recent ATP Deliv* 06/27/2023 0   Final     Therapy Statistic Recent Date Time* 06/27/2023 20220608000000   Final     Therapy Statistic Recent Date Time* 06/27/2023 20230626000000   Final     Therapy Statistic Total Shocks Del* 06/27/2023 0   Final     Therapy Statistic Total Shocks Abo* 06/27/2023 0   Final     Therapy Statistic Total ATP Delive* 06/27/2023 0   Final     Therapy Statistic Total  Date Time* 06/27/2023 90242219462432   Final     Therapy Statistic Total  Date Time* 06/27/2023 20230626000000   Final     Episode Statistic Recent Count 06/27/2023 3   Final     Episode Statistic Type  Category 06/27/2023 AT/AF   Final     Episode Statistic Vendor Type Tania* 06/27/2023 AF   Final     Episode Statistic Recent Count 06/27/2023 0   Final     Episode Statistic Type Category 06/27/2023 Other   Final     Episode Statistic Recent Count 06/27/2023 0   Final     Episode Statistic Type Category 06/27/2023 VT   Final     Episode Statistic Vendor Type Tania* 06/27/2023 NSVT   Final     Episode Statistic Recent Count 06/27/2023 0   Final     Episode Statistic Type Category 06/27/2023 VF   Final     Episode Statistic Vendor Type Tania* 06/27/2023 VF   Final     Episode Statistic Recent Count 06/27/2023 0   Final     Episode Statistic Type Category 06/27/2023 VT   Final     Episode Statistic Vendor Type Tania* 06/27/2023 VT   Final     Episode Statistic Recent Count 06/27/2023 0   Final     Episode Statistic Type Category 06/27/2023 VT   Final     Episode Statistic Vendor Type Tania* 06/27/2023 VT-1   Final     Episode Statistic Recent Date Time* 06/27/2023 [id number]   Final     Episode Statistic Recent Date Time* 06/27/2023 27890689449727   Final     Episode Statistic Recent Date Time* 06/27/2023 14833441034751   Final     Episode Statistic Recent Date Time* 06/27/2023 85400531647723   Final     Episode Statistic Recent Date Time* 06/27/2023 46067345684500   Final     Episode Statistic Recent Date Time* 06/27/2023 85479878351824   Final     Episode Statistic Recent Date Time* 06/27/2023 48549059546371   Final     Episode Statistic Recent Date Time* 06/27/2023 84464809762030   Final     Episode Statistic Recent Date Time* 06/27/2023 17224751471384   Final     Episode Statistic Recent Date Time* 06/27/2023 25435451956788   Final     Episode Statistic Recent Date Time* 06/27/2023 42846096866510   Final     Episode Statistic Recent Date Time* 06/27/2023 64856968304258   Final     Episode Identifier 06/27/2023 APM-10   Final     Episode Type Category 06/27/2023 Periodic EGM   Final     Episode Date Time 06/27/2023  [id number]   Final     Episode Identifier 06/27/2023 [id number]-598   Final     Episode Type Category 06/27/2023 Other   Final     Episode Date Time 06/27/2023 20230625170400   Final     Episode Identifier 06/27/2023 RAAT-623   Final     Episode Type Category 06/27/2023 Other   Final     Episode Date Time 06/27/2023 20230625170200   Final     Episode Identifier 06/27/2023 LVAT-623   Final     Episode Type Category 06/27/2023 Other   Final     Episode Date Time 06/27/2023 20230625165700   Final

## 2023-07-26 ENCOUNTER — ANCILLARY PROCEDURE (OUTPATIENT)
Dept: CARDIOLOGY | Facility: CLINIC | Age: 78
End: 2023-07-26
Attending: INTERNAL MEDICINE
Payer: COMMERCIAL

## 2023-07-26 DIAGNOSIS — I24.9 ACUTE CORONARY SYNDROME (H): ICD-10-CM

## 2023-07-26 DIAGNOSIS — I25.5 ISCHEMIC CARDIOMYOPATHY: ICD-10-CM

## 2023-07-26 DIAGNOSIS — I44.7 LEFT BUNDLE BRANCH BLOCK (LBBB): ICD-10-CM

## 2023-07-26 PROCEDURE — 93284 PRGRMG EVAL IMPLANTABLE DFB: CPT | Performed by: INTERNAL MEDICINE

## 2023-07-27 ENCOUNTER — OFFICE VISIT (OUTPATIENT)
Dept: CARDIOLOGY | Facility: CLINIC | Age: 78
End: 2023-07-27
Payer: COMMERCIAL

## 2023-07-27 VITALS
HEIGHT: 68 IN | BODY MASS INDEX: 24.51 KG/M2 | OXYGEN SATURATION: 97 % | WEIGHT: 161.7 LBS | HEART RATE: 67 BPM | SYSTOLIC BLOOD PRESSURE: 110 MMHG | DIASTOLIC BLOOD PRESSURE: 64 MMHG

## 2023-07-27 DIAGNOSIS — I25.5 ISCHEMIC CARDIOMYOPATHY: ICD-10-CM

## 2023-07-27 DIAGNOSIS — I50.22 CHRONIC SYSTOLIC HEART FAILURE (H): Primary | ICD-10-CM

## 2023-07-27 PROCEDURE — 99214 OFFICE O/P EST MOD 30 MIN: CPT | Performed by: INTERNAL MEDICINE
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
## 2023-08-03 LAB
MDC_IDC_LEAD_IMPLANT_DT: NORMAL
MDC_IDC_LEAD_LOCATION: NORMAL
MDC_IDC_LEAD_LOCATION_DETAIL_1: NORMAL
MDC_IDC_LEAD_MFG: NORMAL
MDC_IDC_LEAD_MODEL: NORMAL
MDC_IDC_LEAD_POLARITY_TYPE: NORMAL
MDC_IDC_LEAD_SERIAL: NORMAL
MDC_IDC_MSMT_BATTERY_DTM: NORMAL
MDC_IDC_MSMT_BATTERY_REMAINING_LONGEVITY: 132 MO
MDC_IDC_MSMT_BATTERY_REMAINING_PERCENTAGE: 100 %
MDC_IDC_MSMT_BATTERY_STATUS: NORMAL
MDC_IDC_MSMT_CAP_CHARGE_DTM: NORMAL
MDC_IDC_MSMT_CAP_CHARGE_TIME: 9.8 S
MDC_IDC_MSMT_CAP_CHARGE_TYPE: NORMAL
MDC_IDC_MSMT_LEADCHNL_LV_IMPEDANCE_VALUE: 1527 OHM
MDC_IDC_MSMT_LEADCHNL_LV_PACING_THRESHOLD_AMPLITUDE: 1.1 V
MDC_IDC_MSMT_LEADCHNL_LV_PACING_THRESHOLD_PULSEWIDTH: 0.5 MS
MDC_IDC_MSMT_LEADCHNL_RA_IMPEDANCE_VALUE: 655 OHM
MDC_IDC_MSMT_LEADCHNL_RA_PACING_THRESHOLD_AMPLITUDE: 0.8 V
MDC_IDC_MSMT_LEADCHNL_RA_PACING_THRESHOLD_PULSEWIDTH: 0.4 MS
MDC_IDC_MSMT_LEADCHNL_RV_IMPEDANCE_VALUE: 489 OHM
MDC_IDC_MSMT_LEADCHNL_RV_PACING_THRESHOLD_AMPLITUDE: 1.6 V
MDC_IDC_MSMT_LEADCHNL_RV_PACING_THRESHOLD_PULSEWIDTH: 0.4 MS
MDC_IDC_PG_IMPLANT_DTM: NORMAL
MDC_IDC_PG_MFG: NORMAL
MDC_IDC_PG_MODEL: NORMAL
MDC_IDC_PG_SERIAL: NORMAL
MDC_IDC_PG_TYPE: NORMAL
MDC_IDC_SESS_CLINIC_NAME: NORMAL
MDC_IDC_SESS_DTM: NORMAL
MDC_IDC_SESS_TYPE: NORMAL
MDC_IDC_SET_BRADY_AT_MODE_SWITCH_MODE: NORMAL
MDC_IDC_SET_BRADY_AT_MODE_SWITCH_RATE: 170 {BEATS}/MIN
MDC_IDC_SET_BRADY_LOWRATE: 60 {BEATS}/MIN
MDC_IDC_SET_BRADY_MAX_SENSOR_RATE: 130 {BEATS}/MIN
MDC_IDC_SET_BRADY_MAX_TRACKING_RATE: 130 {BEATS}/MIN
MDC_IDC_SET_BRADY_MODE: NORMAL
MDC_IDC_SET_BRADY_PAV_DELAY_LOW: 180 MS
MDC_IDC_SET_BRADY_SAV_DELAY_LOW: 80 MS
MDC_IDC_SET_CRT_PACED_CHAMBERS: NORMAL
MDC_IDC_SET_LEADCHNL_LV_PACING_AMPLITUDE: 2.2 V
MDC_IDC_SET_LEADCHNL_LV_PACING_ANODE_ELECTRODE_1: NORMAL
MDC_IDC_SET_LEADCHNL_LV_PACING_ANODE_LOCATION_1: NORMAL
MDC_IDC_SET_LEADCHNL_LV_PACING_CAPTURE_MODE: NORMAL
MDC_IDC_SET_LEADCHNL_LV_PACING_CATHODE_ELECTRODE_1: NORMAL
MDC_IDC_SET_LEADCHNL_LV_PACING_CATHODE_LOCATION_1: NORMAL
MDC_IDC_SET_LEADCHNL_LV_PACING_PULSEWIDTH: 0.5 MS
MDC_IDC_SET_LEADCHNL_LV_SENSING_ADAPTATION_MODE: NORMAL
MDC_IDC_SET_LEADCHNL_LV_SENSING_ANODE_ELECTRODE_1: NORMAL
MDC_IDC_SET_LEADCHNL_LV_SENSING_ANODE_LOCATION_1: NORMAL
MDC_IDC_SET_LEADCHNL_LV_SENSING_CATHODE_ELECTRODE_1: NORMAL
MDC_IDC_SET_LEADCHNL_LV_SENSING_CATHODE_LOCATION_1: NORMAL
MDC_IDC_SET_LEADCHNL_LV_SENSING_SENSITIVITY: 1 MV
MDC_IDC_SET_LEADCHNL_RA_PACING_AMPLITUDE: 5 V
MDC_IDC_SET_LEADCHNL_RA_PACING_CAPTURE_MODE: NORMAL
MDC_IDC_SET_LEADCHNL_RA_PACING_POLARITY: NORMAL
MDC_IDC_SET_LEADCHNL_RA_PACING_PULSEWIDTH: 0.4 MS
MDC_IDC_SET_LEADCHNL_RA_SENSING_ADAPTATION_MODE: NORMAL
MDC_IDC_SET_LEADCHNL_RA_SENSING_POLARITY: NORMAL
MDC_IDC_SET_LEADCHNL_RA_SENSING_SENSITIVITY: 0.25 MV
MDC_IDC_SET_LEADCHNL_RV_PACING_AMPLITUDE: 3.2 V
MDC_IDC_SET_LEADCHNL_RV_PACING_CAPTURE_MODE: NORMAL
MDC_IDC_SET_LEADCHNL_RV_PACING_POLARITY: NORMAL
MDC_IDC_SET_LEADCHNL_RV_PACING_PULSEWIDTH: 0.4 MS
MDC_IDC_SET_LEADCHNL_RV_SENSING_ADAPTATION_MODE: NORMAL
MDC_IDC_SET_LEADCHNL_RV_SENSING_POLARITY: NORMAL
MDC_IDC_SET_LEADCHNL_RV_SENSING_SENSITIVITY: 0.6 MV
MDC_IDC_SET_ZONE_DETECTION_INTERVAL: 250 MS
MDC_IDC_SET_ZONE_DETECTION_INTERVAL: 300 MS
MDC_IDC_SET_ZONE_DETECTION_INTERVAL: 353 MS
MDC_IDC_SET_ZONE_TYPE: NORMAL
MDC_IDC_SET_ZONE_VENDOR_TYPE: NORMAL
MDC_IDC_STAT_AT_BURDEN_PERCENT: 1 %
MDC_IDC_STAT_AT_DTM_END: NORMAL
MDC_IDC_STAT_AT_DTM_START: NORMAL
MDC_IDC_STAT_BRADY_DTM_END: NORMAL
MDC_IDC_STAT_BRADY_DTM_START: NORMAL
MDC_IDC_STAT_BRADY_RA_PERCENT_PACED: 3 %
MDC_IDC_STAT_BRADY_RV_PERCENT_PACED: 57 %
MDC_IDC_STAT_CRT_DTM_END: NORMAL
MDC_IDC_STAT_CRT_DTM_START: NORMAL
MDC_IDC_STAT_CRT_LV_PERCENT_PACED: 100 %
MDC_IDC_STAT_EPISODE_RECENT_COUNT: 0
MDC_IDC_STAT_EPISODE_RECENT_COUNT: 3
MDC_IDC_STAT_EPISODE_RECENT_COUNT_DTM_END: NORMAL
MDC_IDC_STAT_EPISODE_RECENT_COUNT_DTM_START: NORMAL
MDC_IDC_STAT_EPISODE_TYPE: NORMAL
MDC_IDC_STAT_EPISODE_VENDOR_TYPE: NORMAL
MDC_IDC_STAT_TACHYTHERAPY_ATP_DELIVERED_RECENT: 0
MDC_IDC_STAT_TACHYTHERAPY_ATP_DELIVERED_TOTAL: 0
MDC_IDC_STAT_TACHYTHERAPY_RECENT_DTM_END: NORMAL
MDC_IDC_STAT_TACHYTHERAPY_RECENT_DTM_START: NORMAL
MDC_IDC_STAT_TACHYTHERAPY_SHOCKS_ABORTED_RECENT: 0
MDC_IDC_STAT_TACHYTHERAPY_SHOCKS_ABORTED_TOTAL: 0
MDC_IDC_STAT_TACHYTHERAPY_SHOCKS_DELIVERED_RECENT: 0
MDC_IDC_STAT_TACHYTHERAPY_SHOCKS_DELIVERED_TOTAL: 0
MDC_IDC_STAT_TACHYTHERAPY_TOTAL_DTM_END: NORMAL
MDC_IDC_STAT_TACHYTHERAPY_TOTAL_DTM_START: NORMAL

## 2023-08-11 DIAGNOSIS — Z95.1 S/P CABG (CORONARY ARTERY BYPASS GRAFT): ICD-10-CM

## 2023-08-11 RX ORDER — METOPROLOL SUCCINATE 25 MG/1
TABLET, EXTENDED RELEASE ORAL
Qty: 360 TABLET | Refills: 3 | Status: SHIPPED | OUTPATIENT
Start: 2023-08-11 | End: 2024-07-12

## 2023-09-06 DIAGNOSIS — E11.8 TYPE 2 DIABETES MELLITUS WITH COMPLICATION, WITHOUT LONG-TERM CURRENT USE OF INSULIN (H): ICD-10-CM

## 2023-09-07 DIAGNOSIS — N40.1 BENIGN PROSTATIC HYPERPLASIA WITH URINARY FREQUENCY: ICD-10-CM

## 2023-09-07 DIAGNOSIS — R35.0 BENIGN PROSTATIC HYPERPLASIA WITH URINARY FREQUENCY: ICD-10-CM

## 2023-09-07 RX ORDER — GLIPIZIDE 10 MG/1
10 TABLET, FILM COATED, EXTENDED RELEASE ORAL EVERY MORNING
Qty: 90 TABLET | Refills: 1 | Status: SHIPPED | OUTPATIENT
Start: 2023-09-07 | End: 2023-11-28

## 2023-09-07 RX ORDER — FINASTERIDE 5 MG/1
1 TABLET, FILM COATED ORAL DAILY
Qty: 90 TABLET | Refills: 2 | Status: SHIPPED | OUTPATIENT
Start: 2023-09-07 | End: 2024-05-17

## 2023-09-12 DIAGNOSIS — I25.5 ISCHEMIC CARDIOMYOPATHY: ICD-10-CM

## 2023-09-12 DIAGNOSIS — I50.22 CHRONIC SYSTOLIC HEART FAILURE (H): ICD-10-CM

## 2023-09-12 RX ORDER — SACUBITRIL AND VALSARTAN 97; 103 MG/1; MG/1
1 TABLET, FILM COATED ORAL 2 TIMES DAILY
Qty: 180 TABLET | Refills: 3 | Status: SHIPPED | OUTPATIENT
Start: 2023-09-12 | End: 2024-08-13

## 2023-10-02 ENCOUNTER — PATIENT OUTREACH (OUTPATIENT)
Dept: CARE COORDINATION | Facility: CLINIC | Age: 78
End: 2023-10-02
Payer: COMMERCIAL

## 2023-10-30 ENCOUNTER — ANCILLARY PROCEDURE (OUTPATIENT)
Dept: CARDIOLOGY | Facility: CLINIC | Age: 78
End: 2023-10-30
Attending: INTERNAL MEDICINE
Payer: COMMERCIAL

## 2023-10-30 DIAGNOSIS — I25.5 ISCHEMIC CARDIOMYOPATHY: ICD-10-CM

## 2023-10-30 DIAGNOSIS — I44.7 LEFT BUNDLE BRANCH BLOCK (LBBB): ICD-10-CM

## 2023-10-30 DIAGNOSIS — I24.9 ACUTE CORONARY SYNDROME (H): ICD-10-CM

## 2023-10-30 PROCEDURE — 93296 REM INTERROG EVL PM/IDS: CPT | Performed by: INTERNAL MEDICINE

## 2023-10-30 PROCEDURE — 93295 DEV INTERROG REMOTE 1/2/MLT: CPT | Performed by: INTERNAL MEDICINE

## 2023-10-31 LAB
MDC_IDC_EPISODE_DTM: NORMAL
MDC_IDC_EPISODE_ID: NORMAL
MDC_IDC_EPISODE_TYPE: NORMAL
MDC_IDC_LEAD_CONNECTION_STATUS: NORMAL
MDC_IDC_LEAD_IMPLANT_DT: NORMAL
MDC_IDC_LEAD_LOCATION: NORMAL
MDC_IDC_LEAD_LOCATION_DETAIL_1: NORMAL
MDC_IDC_LEAD_MFG: NORMAL
MDC_IDC_LEAD_MODEL: NORMAL
MDC_IDC_LEAD_POLARITY_TYPE: NORMAL
MDC_IDC_LEAD_SERIAL: NORMAL
MDC_IDC_MSMT_BATTERY_DTM: NORMAL
MDC_IDC_MSMT_BATTERY_REMAINING_LONGEVITY: 132 MO
MDC_IDC_MSMT_BATTERY_REMAINING_PERCENTAGE: 100 %
MDC_IDC_MSMT_BATTERY_STATUS: NORMAL
MDC_IDC_MSMT_CAP_CHARGE_DTM: NORMAL
MDC_IDC_MSMT_CAP_CHARGE_TIME: 9.8 S
MDC_IDC_MSMT_CAP_CHARGE_TYPE: NORMAL
MDC_IDC_MSMT_LEADCHNL_LV_IMPEDANCE_VALUE: 1414 OHM
MDC_IDC_MSMT_LEADCHNL_LV_PACING_THRESHOLD_AMPLITUDE: 1.2 V
MDC_IDC_MSMT_LEADCHNL_LV_PACING_THRESHOLD_PULSEWIDTH: 0.5 MS
MDC_IDC_MSMT_LEADCHNL_RA_IMPEDANCE_VALUE: 693 OHM
MDC_IDC_MSMT_LEADCHNL_RA_PACING_THRESHOLD_AMPLITUDE: 0.5 V
MDC_IDC_MSMT_LEADCHNL_RA_PACING_THRESHOLD_PULSEWIDTH: 0.4 MS
MDC_IDC_MSMT_LEADCHNL_RV_IMPEDANCE_VALUE: 472 OHM
MDC_IDC_MSMT_LEADCHNL_RV_PACING_THRESHOLD_AMPLITUDE: 1.7 V
MDC_IDC_MSMT_LEADCHNL_RV_PACING_THRESHOLD_PULSEWIDTH: 0.4 MS
MDC_IDC_PG_IMPLANT_DTM: NORMAL
MDC_IDC_PG_MFG: NORMAL
MDC_IDC_PG_MODEL: NORMAL
MDC_IDC_PG_SERIAL: NORMAL
MDC_IDC_PG_TYPE: NORMAL
MDC_IDC_SESS_CLINIC_NAME: NORMAL
MDC_IDC_SESS_DTM: NORMAL
MDC_IDC_SESS_TYPE: NORMAL
MDC_IDC_SET_BRADY_AT_MODE_SWITCH_MODE: NORMAL
MDC_IDC_SET_BRADY_AT_MODE_SWITCH_RATE: 170 {BEATS}/MIN
MDC_IDC_SET_BRADY_LOWRATE: 60 {BEATS}/MIN
MDC_IDC_SET_BRADY_MAX_SENSOR_RATE: 130 {BEATS}/MIN
MDC_IDC_SET_BRADY_MAX_TRACKING_RATE: 130 {BEATS}/MIN
MDC_IDC_SET_BRADY_MODE: NORMAL
MDC_IDC_SET_BRADY_PAV_DELAY_LOW: 180 MS
MDC_IDC_SET_BRADY_SAV_DELAY_LOW: 80 MS
MDC_IDC_SET_CRT_PACED_CHAMBERS: NORMAL
MDC_IDC_SET_LEADCHNL_LV_PACING_AMPLITUDE: 2.2 V
MDC_IDC_SET_LEADCHNL_LV_PACING_ANODE_ELECTRODE_1: NORMAL
MDC_IDC_SET_LEADCHNL_LV_PACING_ANODE_LOCATION_1: NORMAL
MDC_IDC_SET_LEADCHNL_LV_PACING_CAPTURE_MODE: NORMAL
MDC_IDC_SET_LEADCHNL_LV_PACING_CATHODE_ELECTRODE_1: NORMAL
MDC_IDC_SET_LEADCHNL_LV_PACING_CATHODE_LOCATION_1: NORMAL
MDC_IDC_SET_LEADCHNL_LV_PACING_PULSEWIDTH: 0.5 MS
MDC_IDC_SET_LEADCHNL_LV_SENSING_ADAPTATION_MODE: NORMAL
MDC_IDC_SET_LEADCHNL_LV_SENSING_ANODE_ELECTRODE_1: NORMAL
MDC_IDC_SET_LEADCHNL_LV_SENSING_ANODE_LOCATION_1: NORMAL
MDC_IDC_SET_LEADCHNL_LV_SENSING_CATHODE_ELECTRODE_1: NORMAL
MDC_IDC_SET_LEADCHNL_LV_SENSING_CATHODE_LOCATION_1: NORMAL
MDC_IDC_SET_LEADCHNL_LV_SENSING_SENSITIVITY: 1 MV
MDC_IDC_SET_LEADCHNL_RA_PACING_AMPLITUDE: 2 V
MDC_IDC_SET_LEADCHNL_RA_PACING_CAPTURE_MODE: NORMAL
MDC_IDC_SET_LEADCHNL_RA_PACING_POLARITY: NORMAL
MDC_IDC_SET_LEADCHNL_RA_PACING_PULSEWIDTH: 0.4 MS
MDC_IDC_SET_LEADCHNL_RA_SENSING_ADAPTATION_MODE: NORMAL
MDC_IDC_SET_LEADCHNL_RA_SENSING_POLARITY: NORMAL
MDC_IDC_SET_LEADCHNL_RA_SENSING_SENSITIVITY: 0.25 MV
MDC_IDC_SET_LEADCHNL_RV_PACING_AMPLITUDE: 3.4 V
MDC_IDC_SET_LEADCHNL_RV_PACING_CAPTURE_MODE: NORMAL
MDC_IDC_SET_LEADCHNL_RV_PACING_POLARITY: NORMAL
MDC_IDC_SET_LEADCHNL_RV_PACING_PULSEWIDTH: 0.4 MS
MDC_IDC_SET_LEADCHNL_RV_SENSING_ADAPTATION_MODE: NORMAL
MDC_IDC_SET_LEADCHNL_RV_SENSING_POLARITY: NORMAL
MDC_IDC_SET_LEADCHNL_RV_SENSING_SENSITIVITY: 0.6 MV
MDC_IDC_SET_ZONE_DETECTION_INTERVAL: 250 MS
MDC_IDC_SET_ZONE_DETECTION_INTERVAL: 300 MS
MDC_IDC_SET_ZONE_DETECTION_INTERVAL: 353 MS
MDC_IDC_SET_ZONE_STATUS: NORMAL
MDC_IDC_SET_ZONE_TYPE: NORMAL
MDC_IDC_SET_ZONE_VENDOR_TYPE: NORMAL
MDC_IDC_STAT_AT_BURDEN_PERCENT: 0 %
MDC_IDC_STAT_AT_DTM_END: NORMAL
MDC_IDC_STAT_AT_DTM_START: NORMAL
MDC_IDC_STAT_BRADY_DTM_END: NORMAL
MDC_IDC_STAT_BRADY_DTM_START: NORMAL
MDC_IDC_STAT_BRADY_RA_PERCENT_PACED: 3 %
MDC_IDC_STAT_BRADY_RV_PERCENT_PACED: 43 %
MDC_IDC_STAT_CRT_DTM_END: NORMAL
MDC_IDC_STAT_CRT_DTM_START: NORMAL
MDC_IDC_STAT_CRT_LV_PERCENT_PACED: 99 %
MDC_IDC_STAT_EPISODE_RECENT_COUNT: 0
MDC_IDC_STAT_EPISODE_RECENT_COUNT_DTM_END: NORMAL
MDC_IDC_STAT_EPISODE_RECENT_COUNT_DTM_START: NORMAL
MDC_IDC_STAT_EPISODE_TYPE: NORMAL
MDC_IDC_STAT_EPISODE_VENDOR_TYPE: NORMAL
MDC_IDC_STAT_TACHYTHERAPY_ATP_DELIVERED_RECENT: 0
MDC_IDC_STAT_TACHYTHERAPY_ATP_DELIVERED_TOTAL: 0
MDC_IDC_STAT_TACHYTHERAPY_RECENT_DTM_END: NORMAL
MDC_IDC_STAT_TACHYTHERAPY_RECENT_DTM_START: NORMAL
MDC_IDC_STAT_TACHYTHERAPY_SHOCKS_ABORTED_RECENT: 0
MDC_IDC_STAT_TACHYTHERAPY_SHOCKS_ABORTED_TOTAL: 0
MDC_IDC_STAT_TACHYTHERAPY_SHOCKS_DELIVERED_RECENT: 0
MDC_IDC_STAT_TACHYTHERAPY_SHOCKS_DELIVERED_TOTAL: 0
MDC_IDC_STAT_TACHYTHERAPY_TOTAL_DTM_END: NORMAL
MDC_IDC_STAT_TACHYTHERAPY_TOTAL_DTM_START: NORMAL

## 2023-11-01 DIAGNOSIS — I25.5 ISCHEMIC CARDIOMYOPATHY: ICD-10-CM

## 2023-11-02 RX ORDER — ATORVASTATIN CALCIUM 40 MG/1
TABLET, FILM COATED ORAL
Qty: 90 TABLET | Refills: 0 | Status: SHIPPED | OUTPATIENT
Start: 2023-11-02 | End: 2023-11-28

## 2023-11-28 ENCOUNTER — OFFICE VISIT (OUTPATIENT)
Dept: INTERNAL MEDICINE | Facility: CLINIC | Age: 78
End: 2023-11-28
Payer: COMMERCIAL

## 2023-11-28 VITALS
SYSTOLIC BLOOD PRESSURE: 119 MMHG | BODY MASS INDEX: 24.07 KG/M2 | DIASTOLIC BLOOD PRESSURE: 83 MMHG | TEMPERATURE: 97.8 F | RESPIRATION RATE: 16 BRPM | HEART RATE: 71 BPM | WEIGHT: 158.8 LBS | HEIGHT: 68 IN | OXYGEN SATURATION: 100 %

## 2023-11-28 DIAGNOSIS — E11.22 TYPE 2 DIABETES MELLITUS WITH STAGE 3A CHRONIC KIDNEY DISEASE, WITHOUT LONG-TERM CURRENT USE OF INSULIN (H): ICD-10-CM

## 2023-11-28 DIAGNOSIS — N18.31 TYPE 2 DIABETES MELLITUS WITH STAGE 3A CHRONIC KIDNEY DISEASE, WITHOUT LONG-TERM CURRENT USE OF INSULIN (H): ICD-10-CM

## 2023-11-28 DIAGNOSIS — K21.9 GASTROESOPHAGEAL REFLUX DISEASE WITHOUT ESOPHAGITIS: ICD-10-CM

## 2023-11-28 DIAGNOSIS — I25.5 ISCHEMIC CARDIOMYOPATHY: ICD-10-CM

## 2023-11-28 DIAGNOSIS — Z00.00 ENCOUNTER FOR PREVENTATIVE ADULT HEALTH CARE EXAMINATION: Primary | ICD-10-CM

## 2023-11-28 DIAGNOSIS — E11.8 TYPE 2 DIABETES MELLITUS WITH COMPLICATION, WITHOUT LONG-TERM CURRENT USE OF INSULIN (H): ICD-10-CM

## 2023-11-28 DIAGNOSIS — R05.8 DRY COUGH: ICD-10-CM

## 2023-11-28 DIAGNOSIS — R09.82 POSTNASAL DRIP: ICD-10-CM

## 2023-11-28 LAB
ALBUMIN SERPL BCG-MCNC: 4.3 G/DL (ref 3.5–5.2)
ALBUMIN UR-MCNC: 100 MG/DL
ALP SERPL-CCNC: 72 U/L (ref 40–150)
ALT SERPL W P-5'-P-CCNC: 15 U/L (ref 0–70)
ANION GAP SERPL CALCULATED.3IONS-SCNC: 10 MMOL/L (ref 7–15)
APPEARANCE UR: CLEAR
AST SERPL W P-5'-P-CCNC: 17 U/L (ref 0–45)
BACTERIA #/AREA URNS HPF: ABNORMAL /HPF
BILIRUB SERPL-MCNC: 1.2 MG/DL
BILIRUB UR QL STRIP: NEGATIVE
BUN SERPL-MCNC: 20.6 MG/DL (ref 8–23)
CALCIUM SERPL-MCNC: 9.7 MG/DL (ref 8.8–10.2)
CHLORIDE SERPL-SCNC: 105 MMOL/L (ref 98–107)
CHOLEST SERPL-MCNC: 110 MG/DL
COLOR UR AUTO: YELLOW
CREAT SERPL-MCNC: 1.66 MG/DL (ref 0.67–1.17)
DEPRECATED HCO3 PLAS-SCNC: 24 MMOL/L (ref 22–29)
EGFRCR SERPLBLD CKD-EPI 2021: 42 ML/MIN/1.73M2
ERYTHROCYTE [DISTWIDTH] IN BLOOD BY AUTOMATED COUNT: 13.7 % (ref 10–15)
GLUCOSE SERPL-MCNC: 139 MG/DL (ref 70–99)
GLUCOSE UR STRIP-MCNC: NEGATIVE MG/DL
HBA1C MFR BLD: 7.7 % (ref 0–5.6)
HCT VFR BLD AUTO: 47.3 % (ref 40–53)
HDLC SERPL-MCNC: 30 MG/DL
HGB BLD-MCNC: 15.5 G/DL (ref 13.3–17.7)
HGB UR QL STRIP: ABNORMAL
KETONES UR STRIP-MCNC: NEGATIVE MG/DL
LDLC SERPL CALC-MCNC: 60 MG/DL
LEUKOCYTE ESTERASE UR QL STRIP: NEGATIVE
MCH RBC QN AUTO: 29.5 PG (ref 26.5–33)
MCHC RBC AUTO-ENTMCNC: 32.8 G/DL (ref 31.5–36.5)
MCV RBC AUTO: 90 FL (ref 78–100)
MUCOUS THREADS #/AREA URNS LPF: PRESENT /LPF
NITRATE UR QL: NEGATIVE
NONHDLC SERPL-MCNC: 80 MG/DL
PH UR STRIP: 6 [PH] (ref 5–7)
PLATELET # BLD AUTO: 201 10E3/UL (ref 150–450)
POTASSIUM SERPL-SCNC: 4.9 MMOL/L (ref 3.4–5.3)
PROT SERPL-MCNC: 7.7 G/DL (ref 6.4–8.3)
PSA SERPL DL<=0.01 NG/ML-MCNC: 0.67 NG/ML (ref 0–6.5)
RBC # BLD AUTO: 5.25 10E6/UL (ref 4.4–5.9)
RBC #/AREA URNS AUTO: ABNORMAL /HPF
SODIUM SERPL-SCNC: 139 MMOL/L (ref 135–145)
SP GR UR STRIP: 1.02 (ref 1–1.03)
SQUAMOUS #/AREA URNS AUTO: ABNORMAL /LPF
TRIGL SERPL-MCNC: 102 MG/DL
TSH SERPL DL<=0.005 MIU/L-ACNC: 2.42 UIU/ML (ref 0.3–4.2)
UROBILINOGEN UR STRIP-ACNC: 0.2 E.U./DL
WBC # BLD AUTO: 7.1 10E3/UL (ref 4–11)
WBC #/AREA URNS AUTO: ABNORMAL /HPF

## 2023-11-28 PROCEDURE — 80061 LIPID PANEL: CPT | Performed by: INTERNAL MEDICINE

## 2023-11-28 PROCEDURE — 84443 ASSAY THYROID STIM HORMONE: CPT | Performed by: INTERNAL MEDICINE

## 2023-11-28 PROCEDURE — G0439 PPPS, SUBSEQ VISIT: HCPCS | Performed by: INTERNAL MEDICINE

## 2023-11-28 PROCEDURE — 99214 OFFICE O/P EST MOD 30 MIN: CPT | Mod: 25 | Performed by: INTERNAL MEDICINE

## 2023-11-28 PROCEDURE — 36415 COLL VENOUS BLD VENIPUNCTURE: CPT | Performed by: INTERNAL MEDICINE

## 2023-11-28 PROCEDURE — 85027 COMPLETE CBC AUTOMATED: CPT | Performed by: INTERNAL MEDICINE

## 2023-11-28 PROCEDURE — 81001 URINALYSIS AUTO W/SCOPE: CPT | Performed by: INTERNAL MEDICINE

## 2023-11-28 PROCEDURE — G0103 PSA SCREENING: HCPCS | Performed by: INTERNAL MEDICINE

## 2023-11-28 PROCEDURE — 83036 HEMOGLOBIN GLYCOSYLATED A1C: CPT | Performed by: INTERNAL MEDICINE

## 2023-11-28 PROCEDURE — 80053 COMPREHEN METABOLIC PANEL: CPT | Performed by: INTERNAL MEDICINE

## 2023-11-28 RX ORDER — FLUTICASONE PROPIONATE 50 MCG
1 SPRAY, SUSPENSION (ML) NASAL DAILY
Qty: 16 G | Refills: 3 | Status: SHIPPED | OUTPATIENT
Start: 2023-11-28

## 2023-11-28 RX ORDER — METFORMIN HCL 500 MG
TABLET, EXTENDED RELEASE 24 HR ORAL
Qty: 360 TABLET | Refills: 3 | Status: SHIPPED | OUTPATIENT
Start: 2023-11-28 | End: 2024-01-31

## 2023-11-28 RX ORDER — ATORVASTATIN CALCIUM 40 MG/1
40 TABLET, FILM COATED ORAL DAILY
Qty: 90 TABLET | Refills: 3 | Status: SHIPPED | OUTPATIENT
Start: 2023-11-28

## 2023-11-28 RX ORDER — GLIPIZIDE 10 MG/1
10 TABLET, FILM COATED, EXTENDED RELEASE ORAL EVERY MORNING
Qty: 90 TABLET | Refills: 3 | Status: SHIPPED | OUTPATIENT
Start: 2023-11-28

## 2023-11-28 RX ORDER — OMEPRAZOLE 40 MG/1
40 CAPSULE, DELAYED RELEASE ORAL DAILY
Qty: 90 CAPSULE | Refills: 1 | Status: SHIPPED | OUTPATIENT
Start: 2023-11-28

## 2023-11-28 ASSESSMENT — ENCOUNTER SYMPTOMS
ARTHRALGIAS: 0
WEAKNESS: 0
FREQUENCY: 0
CHILLS: 0
EYE PAIN: 0
HEARTBURN: 0
NAUSEA: 0
MYALGIAS: 0
JOINT SWELLING: 0
FEVER: 0
DIZZINESS: 0
SHORTNESS OF BREATH: 0
DYSURIA: 0
CONSTIPATION: 0
ABDOMINAL PAIN: 0
COUGH: 0
SORE THROAT: 0
HEMATOCHEZIA: 0
NERVOUS/ANXIOUS: 0
PARESTHESIAS: 0
PALPITATIONS: 0
HEADACHES: 0
DIARRHEA: 0
HEMATURIA: 0

## 2023-11-28 ASSESSMENT — ACTIVITIES OF DAILY LIVING (ADL): CURRENT_FUNCTION: NO ASSISTANCE NEEDED

## 2023-11-28 NOTE — PROGRESS NOTES
"SUBJECTIVE:   Adal is a 78 year old, presenting for the following:  Physical (fasting)        11/28/2023    10:17 AM   Additional Questions   Roomed by shirley   Accompanied by ten [email or web address]         11/28/2023    10:17 AM   Patient Reported Additional Medications   Patient reports taking the following new medications none       Are you in the first 12 months of your Medicare coverage?  No    Healthy Habits:     In general, how would you rate your overall health?  Good    Frequency of exercise:  2-3 days/week    Duration of exercise:  30-45 minutes    Do you usually eat at least 4 servings of fruit and vegetables a day, include whole grains    & fiber and avoid regularly eating high fat or \"junk\" foods?  Yes    Taking medications regularly:  Yes    Medication side effects:  None    Ability to successfully perform activities of daily living:  No assistance needed    Home Safety:  No safety concerns identified    Hearing Impairment:  Need to ask people to speak up or repeat themselves and no hearing concerns    In the past 6 months, have you been bothered by leaking of urine?  No    In general, how would you rate your overall mental or emotional health?  Excellent    Additional concerns today:  No      Today's PHQ-2 Score:       11/28/2023    10:07 AM   PHQ-2 ( 1999 [employer])   Q1: Little interest or pleasure in doing things 0   Q2: Feeling down, depressed or hopeless 0   PHQ-2 Score 0   Q1: Little interest or pleasure in doing things Not at all   Q2: Feeling down, depressed or hopeless Not at all   PHQ-2 Score 0           Have you ever done Advance Care Planning? (For example, a Health Directive, POLST, or a discussion with a medical provider or your loved ones about your wishes):        Fall risk  Fallen 2 or more times in the past year?: No  Any fall with injury in the past year?: No  click delete button to remove this line now  Cognitive Screening   1) Repeat 3 items (Leader, Season, Table)    2) Clock draw: " NORMAL  3) 3 item recall: Recalls 1 object   Results: NORMAL clock, 1-2 items recalled: COGNITIVE IMPAIRMENT LESS LIKELY    Mini-CogTM Copyright S Rossana. Licensed by the author for use in [facility]; reprinted with permission (jose c@[facility]). All rights reserved.      Do you have sleep apnea, excessive snoring or daytime drowsiness? : snoring at night    Reviewed and updated as needed this visit by clinical staff   Tobacco  Allergies  Meds              Reviewed and updated as needed this visit by Provider                 Social History     Tobacco Use    Smoking status: Never    Smokeless tobacco: Never   Substance Use Topics    Alcohol use: Not Currently     Comment: 1-2x in 6 months             11/28/2023    10:06 AM   Alcohol Use   Prescreen: >3 drinks/day or >7 drinks/week? No          No data to display              Do you have a current opioid prescription? No  Do you use any other controlled substances or medications that are not prescribed by a provider? None          PROBLEMS TO ADD ON...  Has H/O DM. On diet , exercise and Metformin , Glipizide. Blood sugars are controlled. No parestesias. No hypoglycemias.  Has H/O hyperlipidemia. On medical treatment and diet. No side effects. No muscle weakness, myalgias or upset stomach.   Has h/o ischemic CMP, on medical treatment. No increased edema, SOB, CP.   Concern for chronic dry cough. Has postnasal secretions and runny nose with cold weather.   Has occasional heartburns.     Current providers sharing in care for this patient include:   Patient Care Team:  Alvaro Lindo MD as PCP - General (Internal Medicine)  Alvaro Lindo MD as Assigned PCP  Hector Hall MD as MD (Cardiology)  Flavio Garcia MD as Assigned Heart and Vascular Provider  Vy Funk PA-C as Physician Assistant (Cardiovascular Disease)    The following health maintenance items are reviewed in Epic and correct as of today:  Health Maintenance    Topic Date Due    HF ACTION PLAN  Never done    RSV VACCINE (Pregnancy & 60+) (1 - 1-dose 60+ series) Never done    ZOSTER IMMUNIZATION (2 of 3) 08/11/2009    EYE EXAM  07/01/2020    DIABETIC FOOT EXAM  05/17/2023    BMP  10/06/2023    ALT  10/31/2023    LIPID  10/31/2023    MICROALBUMIN  10/31/2023    CBC  10/31/2023    MEDICARE ANNUAL WELLNESS VISIT  10/31/2023    HEMOGLOBIN  10/31/2023    A1C  01/10/2024    ANNUAL REVIEW OF HM ORDERS  07/10/2024    FALL RISK ASSESSMENT  11/28/2024    ADVANCE CARE PLANNING  10/31/2027    DTAP/TDAP/TD IMMUNIZATION (3 - Td or Tdap) 10/10/2028    TSH W/FREE T4 REFLEX  Completed    HEPATITIS C SCREENING  Completed    PHQ-2 (once per calendar year)  Completed    INFLUENZA VACCINE  Completed    Pneumococcal Vaccine: 65+ Years  Completed    URINALYSIS  Completed    COVID-19 Vaccine  Completed    IPV IMMUNIZATION  Aged Out    HPV IMMUNIZATION  Aged Out    MENINGITIS IMMUNIZATION  Aged Out    RSV MONOCLONAL ANTIBODY  Aged Out    COLORECTAL CANCER SCREENING  Discontinued     Lab work is in process  Labs reviewed in EPIC          Review of Systems   Constitutional:  Negative for chills and fever.   HENT:  Negative for congestion, ear pain, hearing loss and sore throat.    Eyes:  Negative for pain and visual disturbance.   Respiratory:  Negative for cough and shortness of breath.    Cardiovascular:  Negative for chest pain, palpitations and peripheral edema.   Gastrointestinal:  Negative for abdominal pain, constipation, diarrhea, heartburn, hematochezia and nausea.   Genitourinary:  Negative for dysuria, frequency, genital sores, hematuria, impotence, penile discharge and urgency.   Musculoskeletal:  Negative for arthralgias, joint swelling and myalgias.   Skin:  Negative for rash.   Neurological:  Negative for dizziness, weakness, headaches and paresthesias.   Psychiatric/Behavioral:  Negative for mood changes. The patient is not nervous/anxious.          OBJECTIVE:   /83 (BP  "Location: Left arm, Patient Position: Sitting, Cuff Size: Adult Regular)   Pulse 71   Temp 97.8  F (36.6  C) (Oral)   Resp 16   Ht 1.727 m (5' 8\")   Wt 72 kg (158 lb 12.8 oz)   SpO2 100%   BMI 24.15 kg/m   Estimated body mass index is 24.15 kg/m  as calculated from the following:    Height as of this encounter: 1.727 m (5' 8\").    Weight as of this encounter: 72 kg (158 lb 12.8 oz).  Physical Exam  GENERAL: healthy, alert and no distress  EYES: Eyes grossly normal to inspection, PERRL and conjunctivae and sclerae normal  HENT: ear canals and TM's normal, nose and mouth without ulcers or lesions  NECK: no adenopathy, no asymmetry, masses, or scars and thyroid normal to palpation  RESP: lungs clear to auscultation - no rales, rhonchi or wheezes  CV: regular rate and rhythm, normal S1 S2, no S3 or S4, no murmur, click or rub, no peripheral edema and peripheral pulses strong  ABDOMEN: soft, nontender, no hepatosplenomegaly, no masses and bowel sounds normal  MS: no gross musculoskeletal defects noted, no edema  SKIN: no suspicious lesions or rashes  NEURO: Normal strength and tone, mentation intact and speech normal  PSYCH: mentation appears normal, affect normal/bright    Diagnostic Test Results:  Labs reviewed in Epic    ASSESSMENT / PLAN:       ICD-10-CM    1. Encounter for preventative adult health care examination  Z00.00 Lipid panel reflex to direct LDL Fasting     CBC with platelets     Comprehensive metabolic panel (BMP + Alb, Alk Phos, ALT, AST, Total. Bili, TP)     PSA, screen     UA with Microscopic reflex to Culture - lab collect     TSH with free T4 reflex      2. Type 2 diabetes mellitus with complication, without long-term current use of insulin (H)  E11.8 metFORMIN (GLUCOPHAGE XR) 500 MG 24 hr tablet     glipiZIDE (GLIPIZIDE XL) 10 MG 24 hr tablet     Hemoglobin A1c     OFFICE/OUTPT VISIT,EST,LEVL IV      3. Ischemic cardiomyopathy  I25.5 atorvastatin (LIPITOR) 40 MG tablet     OFFICE/OUTPT " VISIT,EST,LEVL IV      4. Dry cough  R05.8 omeprazole (PRILOSEC) 40 MG DR capsule     fluticasone (FLONASE) 50 MCG/ACT nasal spray     OFFICE/OUTPT VISIT,EST,LEVL IV      5. Postnasal drip  R09.82 fluticasone (FLONASE) 50 MCG/ACT nasal spray     OFFICE/OUTPT VISIT,EST,LEVL IV      6. Gastroesophageal reflux disease without esophagitis  K21.9 omeprazole (PRILOSEC) 40 MG DR capsule      7. Type 2 diabetes mellitus with stage 3a chronic kidney disease, without long-term current use of insulin (H)  E11.22     N18.31         Assess lab work   Start Flonase for postnasal drip   Start PPI for GERD and dry cough   Continue rest of treatment  Consider adding Ozempic. Has tried Jardiance, but had side effects     Patient has been advised of split billing requirements and indicates understanding: Yes      COUNSELING:  Reviewed preventive health counseling, as reflected in patient instructions       Regular exercise       Healthy diet/nutrition       Vision screening       Hearing screening       Dental care       Colon cancer screening       Prostate cancer screening        He reports that he has never smoked. He has never used smokeless tobacco.      Appropriate preventive services were discussed with this patient, including applicable screening as appropriate for fall prevention, nutrition, physical activity, Tobacco-use cessation, weight loss and cognition.  Checklist reviewing preventive services available has been given to the patient.    Reviewed patients plan of care and provided an AVS. The Intermediate Care Plan ( asthma action plan, low back pain action plan, and migraine action plan) for Adal meets the Care Plan requirement. This Care Plan has been established and reviewed with the Patient.          Alvaro Lindo MD  [facility]    Identified Health Risks:  I have reviewed Opioid Use Disorder and Substance Use Disorder risk factors and made any needed referrals.

## 2023-11-28 NOTE — LETTER
November 29, 2023      Adal Bates  [address] [place] [address]        Dear ,    We are writing to inform you of your test results.    Slightly decreased kidney function, stable.  Improved diabetic control. Still recommend better diet, exercise. We can try low dose Ozempic to help with treatment.    Resulted Orders   Lipid panel reflex to direct LDL Fasting   Result Value Ref Range    Cholesterol 110 <200 mg/dL    Triglycerides 102 <150 mg/dL    Direct Measure HDL 30 (L) >=40 mg/dL    LDL Cholesterol Calculated 60 <=100 mg/dL    Non HDL Cholesterol 80 <130 mg/dL    Narrative    Cholesterol  Desirable:  <200 mg/dL    Triglycerides  Normal:  Less than 150 mg/dL  Borderline High:  150-199 mg/dL  High:  200-499 mg/dL  Very High:  Greater than or equal to 500 mg/dL    Direct Measure HDL  Female:  Greater than or equal to 50 mg/dL   Male:  Greater than or equal to 40 mg/dL    LDL Cholesterol  Desirable:  <100mg/dL  Above Desirable:  100-129 mg/dL   Borderline High:  130-159 mg/dL   High:  160-189 mg/dL   Very High:  >= 190 mg/dL    Non HDL Cholesterol  Desirable:  130 mg/dL  Above Desirable:  130-159 mg/dL  Borderline High:  160-189 mg/dL  High:  190-219 mg/dL  Very High:  Greater than or equal to 220 mg/dL   CBC with platelets   Result Value Ref Range    WBC Count 7.1 4.0 - 11.0 10e3/uL    RBC Count 5.25 4.40 - 5.90 10e6/uL    Hemoglobin 15.5 13.3 - 17.7 g/dL    Hematocrit 47.3 40.0 - 53.0 %    MCV 90 78 - 100 fL    MCH 29.5 26.5 - 33.0 pg    MCHC 32.8 31.5 - 36.5 g/dL    RDW 13.7 10.0 - 15.0 %    Platelet Count 201 150 - 450 10e3/uL   Comprehensive metabolic panel (BMP + Alb, Alk Phos, ALT, AST, Total. Bili, TP)   Result Value Ref Range    Sodium 139 135 - 145 mmol/L      Comment:      Reference intervals for this test were updated on 09/26/2023 to more accurately reflect our healthy population. There may be differences in the flagging of prior results with similar values performed with this method.  Interpretation of those prior results can be made in the context of the updated reference intervals.     Potassium 4.9 3.4 - 5.3 mmol/L    Carbon Dioxide (CO2) 24 22 - 29 mmol/L    Anion Gap 10 7 - 15 mmol/L    Urea Nitrogen 20.6 8.0 - 23.0 mg/dL    Creatinine 1.66 (H) 0.67 - 1.17 mg/dL    GFR Estimate 42 (L) >60 mL/min/1.73m2    Calcium 9.7 8.8 - 10.2 mg/dL    Chloride 105 98 - 107 mmol/L    Glucose 139 (H) 70 - 99 mg/dL    Alkaline Phosphatase 72 40 - 150 U/L      Comment:      Reference intervals for this test were updated on 11/14/2023 to more accurately reflect our healthy population. There may be differences in the flagging of prior results with similar values performed with this method. Interpretation of those prior results can be made in the context of the updated reference intervals.    AST 17 0 - 45 U/L      Comment:      Reference intervals for this test were updated on 6/12/2023 to more accurately reflect our healthy population. There may be differences in the flagging of prior results with similar values performed with this method. Interpretation of those prior results can be made in the context of the updated reference intervals.    ALT 15 0 - 70 U/L      Comment:      Reference intervals for this test were updated on 6/12/2023 to more accurately reflect our healthy population. There may be differences in the flagging of prior results with similar values performed with this method. Interpretation of those prior results can be made in the context of the updated reference intervals.      Protein Total 7.7 6.4 - 8.3 g/dL    Albumin 4.3 3.5 - 5.2 g/dL    Bilirubin Total 1.2 <=1.2 mg/dL   PSA, screen   Result Value Ref Range    Prostate Specific Antigen Screen 0.67 0.00 - 6.50 ng/mL    Narrative    This result is obtained using the Roche Elecsys total PSA method on the herlinda [id number] immunoassay analyzer. Results obtained with different assay methods or kits cannot be used interchangeably.   UA with Microscopic  reflex to Culture - lab collect   Result Value Ref Range    Color Urine Yellow Colorless, Straw, Light Yellow, Yellow    Appearance Urine Clear Clear    Glucose Urine Negative Negative mg/dL    Bilirubin Urine Negative Negative    Ketones Urine Negative Negative mg/dL    Specific Gravity Urine 1.020 1.003 - 1.035    Blood Urine Trace (A) Negative    pH Urine 6.0 5.0 - 7.0    Protein Albumin Urine 100 (A) Negative mg/dL    Urobilinogen Urine 0.2 0.2, 1.0 E.U./dL    Nitrite Urine Negative Negative    Leukocyte Esterase Urine Negative Negative   TSH with free T4 reflex   Result Value Ref Range    TSH 2.42 0.30 - 4.20 uIU/mL   Hemoglobin A1c   Result Value Ref Range    Hemoglobin A1C 7.7 (H) 0.0 - 5.6 %      Comment:      Normal <5.7%   Prediabetes 5.7-6.4%    Diabetes 6.5% or higher     Note: Adopted from ADA consensus guidelines.   UA Microscopic with Reflex to Culture   Result Value Ref Range    Bacteria Urine Few (A) None Seen /HPF    RBC Urine 0-2 0-2 /HPF /HPF    WBC Urine None Seen 0-5 /HPF /HPF    Squamous Epithelials Urine Few (A) None Seen /LPF    Mucus Urine Present (A) None Seen /LPF    Narrative    Urine Culture not indicated       If you have any questions or concerns, please call the clinic at the number listed above.       Sincerely,      Alvaro Lindo MD

## 2024-01-30 ENCOUNTER — TELEPHONE (OUTPATIENT)
Dept: INTERNAL MEDICINE | Facility: CLINIC | Age: 79
End: 2024-01-30
Payer: COMMERCIAL

## 2024-01-30 DIAGNOSIS — N18.31 TYPE 2 DIABETES MELLITUS WITH STAGE 3A CHRONIC KIDNEY DISEASE, WITHOUT LONG-TERM CURRENT USE OF INSULIN (H): ICD-10-CM

## 2024-01-30 DIAGNOSIS — E11.22 TYPE 2 DIABETES MELLITUS WITH STAGE 3A CHRONIC KIDNEY DISEASE, WITHOUT LONG-TERM CURRENT USE OF INSULIN (H): ICD-10-CM

## 2024-01-30 NOTE — TELEPHONE ENCOUNTER
S-(situation): Pharmacy wants to verify if patient should be on a dose of Ozempic 0.25 mg or 0.5 mg/dose.    B-(background): Patient transferred prescription for Ozempic to [facility], Rosie called to confirm what dose patient should be on- should patient be titrating up to 0.5 mg/dose?     A-(assessment): Rosie unsure what dose patient has been taking.     R-(recommendations): Please advise what dose patient should be one.     Call back [facility] with PCP recommendation at [phone] with patient reference #[id number].

## 2024-01-31 DIAGNOSIS — I25.5 ISCHEMIC CARDIOMYOPATHY: ICD-10-CM

## 2024-01-31 DIAGNOSIS — E11.8 TYPE 2 DIABETES MELLITUS WITH COMPLICATION, WITHOUT LONG-TERM CURRENT USE OF INSULIN (H): ICD-10-CM

## 2024-01-31 DIAGNOSIS — I50.43 ACUTE ON CHRONIC COMBINED SYSTOLIC (CONGESTIVE) AND DIASTOLIC (CONGESTIVE) HEART FAILURE (H): ICD-10-CM

## 2024-01-31 RX ORDER — METFORMIN HCL 500 MG
TABLET, EXTENDED RELEASE 24 HR ORAL
Qty: 360 TABLET | Refills: 3 | Status: SHIPPED | OUTPATIENT
Start: 2024-01-31

## 2024-01-31 NOTE — TELEPHONE ENCOUNTER
NEW pharmacy for the Metformin.   Pt has refills for Spironolactone at [facility] already.

## 2024-01-31 NOTE — TELEPHONE ENCOUNTER
Medication Question or Refill    Medication refill     spironolactone (ALDACTONE) 25 MG tablet   metFORMIN (GLUCOPHAGE XR) 500 MG 24 hr tablet    What medication are you calling about (include dose and sig)?:     Preferred Pharmacy:       [facility]  [place], [place] - [address]  [address]  [place] [address]  Phone: [phone] Fax: [phone]      Controlled Substance Agreement on file:   CSA -- Patient Level:    CSA: None found at the patient level.       Who prescribed the medication?: pcp    Do you need a refill? Yes    When did you use the medication last? today    Patient offered an appointment? No    Do you have any questions or concerns?  No      Could we send this information to you in [facility] or would you prefer to receive a phone call?:   Patient would prefer a phone call   Okay to leave a detailed message?: Yes at Cell number on file:    Telephone Information:   Mobile [phone]     Keya HIGH

## 2024-02-05 NOTE — TELEPHONE ENCOUNTER
Patients wife called about getting the medication refill for spironolactone (ALDACTONE) 25 MG tablet please let the patient know when the refill has been sent to pharmacy

## 2024-02-15 ENCOUNTER — ANCILLARY PROCEDURE (OUTPATIENT)
Dept: CARDIOLOGY | Facility: CLINIC | Age: 79
End: 2024-02-15
Attending: INTERNAL MEDICINE
Payer: COMMERCIAL

## 2024-02-15 DIAGNOSIS — I44.7 LEFT BUNDLE BRANCH BLOCK (LBBB): ICD-10-CM

## 2024-02-15 DIAGNOSIS — I25.5 ISCHEMIC CARDIOMYOPATHY: ICD-10-CM

## 2024-02-15 DIAGNOSIS — I24.9 ACUTE CORONARY SYNDROME (H): ICD-10-CM

## 2024-02-15 PROCEDURE — 93295 DEV INTERROG REMOTE 1/2/MLT: CPT | Performed by: INTERNAL MEDICINE

## 2024-02-15 PROCEDURE — 93296 REM INTERROG EVL PM/IDS: CPT | Performed by: INTERNAL MEDICINE

## 2024-02-16 LAB
MDC_IDC_EPISODE_DTM: NORMAL
MDC_IDC_EPISODE_DURATION: 3 S
MDC_IDC_EPISODE_DURATION: 3 S
MDC_IDC_EPISODE_ID: NORMAL
MDC_IDC_EPISODE_TYPE: NORMAL
MDC_IDC_LEAD_CONNECTION_STATUS: NORMAL
MDC_IDC_LEAD_IMPLANT_DT: NORMAL
MDC_IDC_LEAD_LOCATION: NORMAL
MDC_IDC_LEAD_LOCATION_DETAIL_1: NORMAL
MDC_IDC_LEAD_MFG: NORMAL
MDC_IDC_LEAD_MODEL: NORMAL
MDC_IDC_LEAD_POLARITY_TYPE: NORMAL
MDC_IDC_LEAD_SERIAL: NORMAL
MDC_IDC_MSMT_BATTERY_DTM: NORMAL
MDC_IDC_MSMT_BATTERY_REMAINING_LONGEVITY: 132 MO
MDC_IDC_MSMT_BATTERY_REMAINING_PERCENTAGE: 100 %
MDC_IDC_MSMT_BATTERY_STATUS: NORMAL
MDC_IDC_MSMT_CAP_CHARGE_DTM: NORMAL
MDC_IDC_MSMT_CAP_CHARGE_TIME: 9.9 S
MDC_IDC_MSMT_CAP_CHARGE_TYPE: NORMAL
MDC_IDC_MSMT_LEADCHNL_LV_IMPEDANCE_VALUE: 1403 OHM
MDC_IDC_MSMT_LEADCHNL_LV_PACING_THRESHOLD_AMPLITUDE: 1 V
MDC_IDC_MSMT_LEADCHNL_LV_PACING_THRESHOLD_PULSEWIDTH: 0.5 MS
MDC_IDC_MSMT_LEADCHNL_RA_IMPEDANCE_VALUE: 756 OHM
MDC_IDC_MSMT_LEADCHNL_RA_PACING_THRESHOLD_AMPLITUDE: 0.5 V
MDC_IDC_MSMT_LEADCHNL_RA_PACING_THRESHOLD_PULSEWIDTH: 0.4 MS
MDC_IDC_MSMT_LEADCHNL_RV_IMPEDANCE_VALUE: 518 OHM
MDC_IDC_PG_IMPLANT_DTM: NORMAL
MDC_IDC_PG_MFG: NORMAL
MDC_IDC_PG_MODEL: NORMAL
MDC_IDC_PG_SERIAL: NORMAL
MDC_IDC_PG_TYPE: NORMAL
MDC_IDC_SESS_CLINIC_NAME: NORMAL
MDC_IDC_SESS_DTM: NORMAL
MDC_IDC_SESS_TYPE: NORMAL
MDC_IDC_SET_BRADY_AT_MODE_SWITCH_MODE: NORMAL
MDC_IDC_SET_BRADY_AT_MODE_SWITCH_RATE: 170 {BEATS}/MIN
MDC_IDC_SET_BRADY_LOWRATE: 60 {BEATS}/MIN
MDC_IDC_SET_BRADY_MAX_SENSOR_RATE: 130 {BEATS}/MIN
MDC_IDC_SET_BRADY_MAX_TRACKING_RATE: 130 {BEATS}/MIN
MDC_IDC_SET_BRADY_MODE: NORMAL
MDC_IDC_SET_BRADY_PAV_DELAY_LOW: 180 MS
MDC_IDC_SET_BRADY_SAV_DELAY_LOW: 80 MS
MDC_IDC_SET_CRT_PACED_CHAMBERS: NORMAL
MDC_IDC_SET_LEADCHNL_LV_PACING_AMPLITUDE: 2.3 V
MDC_IDC_SET_LEADCHNL_LV_PACING_ANODE_ELECTRODE_1: NORMAL
MDC_IDC_SET_LEADCHNL_LV_PACING_ANODE_LOCATION_1: NORMAL
MDC_IDC_SET_LEADCHNL_LV_PACING_CAPTURE_MODE: NORMAL
MDC_IDC_SET_LEADCHNL_LV_PACING_CATHODE_ELECTRODE_1: NORMAL
MDC_IDC_SET_LEADCHNL_LV_PACING_CATHODE_LOCATION_1: NORMAL
MDC_IDC_SET_LEADCHNL_LV_PACING_PULSEWIDTH: 0.5 MS
MDC_IDC_SET_LEADCHNL_LV_SENSING_ADAPTATION_MODE: NORMAL
MDC_IDC_SET_LEADCHNL_LV_SENSING_ANODE_ELECTRODE_1: NORMAL
MDC_IDC_SET_LEADCHNL_LV_SENSING_ANODE_LOCATION_1: NORMAL
MDC_IDC_SET_LEADCHNL_LV_SENSING_CATHODE_ELECTRODE_1: NORMAL
MDC_IDC_SET_LEADCHNL_LV_SENSING_CATHODE_LOCATION_1: NORMAL
MDC_IDC_SET_LEADCHNL_LV_SENSING_SENSITIVITY: 1 MV
MDC_IDC_SET_LEADCHNL_RA_PACING_AMPLITUDE: 2 V
MDC_IDC_SET_LEADCHNL_RA_PACING_CAPTURE_MODE: NORMAL
MDC_IDC_SET_LEADCHNL_RA_PACING_POLARITY: NORMAL
MDC_IDC_SET_LEADCHNL_RA_PACING_PULSEWIDTH: 0.4 MS
MDC_IDC_SET_LEADCHNL_RA_SENSING_ADAPTATION_MODE: NORMAL
MDC_IDC_SET_LEADCHNL_RA_SENSING_POLARITY: NORMAL
MDC_IDC_SET_LEADCHNL_RA_SENSING_SENSITIVITY: 0.25 MV
MDC_IDC_SET_LEADCHNL_RV_PACING_AMPLITUDE: 3.6 V
MDC_IDC_SET_LEADCHNL_RV_PACING_CAPTURE_MODE: NORMAL
MDC_IDC_SET_LEADCHNL_RV_PACING_POLARITY: NORMAL
MDC_IDC_SET_LEADCHNL_RV_PACING_PULSEWIDTH: 0.4 MS
MDC_IDC_SET_LEADCHNL_RV_SENSING_ADAPTATION_MODE: NORMAL
MDC_IDC_SET_LEADCHNL_RV_SENSING_POLARITY: NORMAL
MDC_IDC_SET_LEADCHNL_RV_SENSING_SENSITIVITY: 0.6 MV
MDC_IDC_SET_ZONE_DETECTION_INTERVAL: 250 MS
MDC_IDC_SET_ZONE_DETECTION_INTERVAL: 300 MS
MDC_IDC_SET_ZONE_DETECTION_INTERVAL: 353 MS
MDC_IDC_SET_ZONE_STATUS: NORMAL
MDC_IDC_SET_ZONE_TYPE: NORMAL
MDC_IDC_SET_ZONE_VENDOR_TYPE: NORMAL
MDC_IDC_STAT_AT_BURDEN_PERCENT: 1 %
MDC_IDC_STAT_AT_DTM_END: NORMAL
MDC_IDC_STAT_AT_DTM_START: NORMAL
MDC_IDC_STAT_BRADY_DTM_END: NORMAL
MDC_IDC_STAT_BRADY_DTM_START: NORMAL
MDC_IDC_STAT_BRADY_RA_PERCENT_PACED: 2 %
MDC_IDC_STAT_BRADY_RV_PERCENT_PACED: 38 %
MDC_IDC_STAT_CRT_DTM_END: NORMAL
MDC_IDC_STAT_CRT_DTM_START: NORMAL
MDC_IDC_STAT_CRT_LV_PERCENT_PACED: 99 %
MDC_IDC_STAT_EPISODE_RECENT_COUNT: 0
MDC_IDC_STAT_EPISODE_RECENT_COUNT_DTM_END: NORMAL
MDC_IDC_STAT_EPISODE_RECENT_COUNT_DTM_START: NORMAL
MDC_IDC_STAT_EPISODE_TYPE: NORMAL
MDC_IDC_STAT_EPISODE_VENDOR_TYPE: NORMAL
MDC_IDC_STAT_TACHYTHERAPY_ATP_DELIVERED_RECENT: 0
MDC_IDC_STAT_TACHYTHERAPY_ATP_DELIVERED_TOTAL: 0
MDC_IDC_STAT_TACHYTHERAPY_RECENT_DTM_END: NORMAL
MDC_IDC_STAT_TACHYTHERAPY_RECENT_DTM_START: NORMAL
MDC_IDC_STAT_TACHYTHERAPY_SHOCKS_ABORTED_RECENT: 0
MDC_IDC_STAT_TACHYTHERAPY_SHOCKS_ABORTED_TOTAL: 0
MDC_IDC_STAT_TACHYTHERAPY_SHOCKS_DELIVERED_RECENT: 0
MDC_IDC_STAT_TACHYTHERAPY_SHOCKS_DELIVERED_TOTAL: 0
MDC_IDC_STAT_TACHYTHERAPY_TOTAL_DTM_END: NORMAL
MDC_IDC_STAT_TACHYTHERAPY_TOTAL_DTM_START: NORMAL

## 2024-02-19 DIAGNOSIS — E11.9 TYPE 2 DIABETES, HBA1C GOAL < 8% (H): Primary | ICD-10-CM

## 2024-02-19 RX ORDER — BLOOD SUGAR DIAGNOSTIC
STRIP MISCELLANEOUS
Qty: 100 STRIP | Refills: 3 | Status: SHIPPED | OUTPATIENT
Start: 2024-02-19

## 2024-04-03 DIAGNOSIS — E11.22 TYPE 2 DIABETES MELLITUS WITH STAGE 3A CHRONIC KIDNEY DISEASE, WITHOUT LONG-TERM CURRENT USE OF INSULIN (H): ICD-10-CM

## 2024-04-03 DIAGNOSIS — N18.31 TYPE 2 DIABETES MELLITUS WITH STAGE 3A CHRONIC KIDNEY DISEASE, WITHOUT LONG-TERM CURRENT USE OF INSULIN (H): ICD-10-CM

## 2024-04-03 RX ORDER — SEMAGLUTIDE 0.68 MG/ML
INJECTION, SOLUTION SUBCUTANEOUS
Qty: 6 ML | Refills: 5 | Status: SHIPPED | OUTPATIENT
Start: 2024-04-03

## 2024-05-16 ENCOUNTER — ANCILLARY PROCEDURE (OUTPATIENT)
Dept: CARDIOLOGY | Facility: CLINIC | Age: 79
End: 2024-05-16
Attending: INTERNAL MEDICINE
Payer: COMMERCIAL

## 2024-05-16 DIAGNOSIS — I24.9 ACUTE CORONARY SYNDROME (H): ICD-10-CM

## 2024-05-16 DIAGNOSIS — N40.1 BENIGN PROSTATIC HYPERPLASIA WITH URINARY FREQUENCY: ICD-10-CM

## 2024-05-16 DIAGNOSIS — R35.0 BENIGN PROSTATIC HYPERPLASIA WITH URINARY FREQUENCY: ICD-10-CM

## 2024-05-16 DIAGNOSIS — I25.5 ISCHEMIC CARDIOMYOPATHY: ICD-10-CM

## 2024-05-16 DIAGNOSIS — I44.7 LEFT BUNDLE BRANCH BLOCK (LBBB): ICD-10-CM

## 2024-05-16 PROCEDURE — 93296 REM INTERROG EVL PM/IDS: CPT | Performed by: INTERNAL MEDICINE

## 2024-05-16 PROCEDURE — 93295 DEV INTERROG REMOTE 1/2/MLT: CPT | Performed by: INTERNAL MEDICINE

## 2024-05-17 RX ORDER — FINASTERIDE 5 MG/1
1 TABLET, FILM COATED ORAL DAILY
Qty: 90 TABLET | Refills: 1 | Status: SHIPPED | OUTPATIENT
Start: 2024-05-17

## 2024-05-21 LAB
MDC_IDC_EPISODE_DTM: NORMAL
MDC_IDC_EPISODE_DURATION: 2 S
MDC_IDC_EPISODE_DURATION: 3 S
MDC_IDC_EPISODE_DURATION: 5 S
MDC_IDC_EPISODE_ID: NORMAL
MDC_IDC_EPISODE_TYPE: NORMAL
MDC_IDC_LEAD_CONNECTION_STATUS: NORMAL
MDC_IDC_LEAD_IMPLANT_DT: NORMAL
MDC_IDC_LEAD_LOCATION: NORMAL
MDC_IDC_LEAD_LOCATION_DETAIL_1: NORMAL
MDC_IDC_LEAD_MFG: NORMAL
MDC_IDC_LEAD_MODEL: NORMAL
MDC_IDC_LEAD_POLARITY_TYPE: NORMAL
MDC_IDC_LEAD_SERIAL: NORMAL
MDC_IDC_MSMT_BATTERY_DTM: NORMAL
MDC_IDC_MSMT_BATTERY_REMAINING_LONGEVITY: 132 MO
MDC_IDC_MSMT_BATTERY_REMAINING_PERCENTAGE: 100 %
MDC_IDC_MSMT_BATTERY_STATUS: NORMAL
MDC_IDC_MSMT_CAP_CHARGE_DTM: NORMAL
MDC_IDC_MSMT_CAP_CHARGE_TIME: 9.9 S
MDC_IDC_MSMT_CAP_CHARGE_TYPE: NORMAL
MDC_IDC_MSMT_LEADCHNL_LV_IMPEDANCE_VALUE: 1505 OHM
MDC_IDC_MSMT_LEADCHNL_LV_PACING_THRESHOLD_AMPLITUDE: 1.4 V
MDC_IDC_MSMT_LEADCHNL_LV_PACING_THRESHOLD_PULSEWIDTH: 0.5 MS
MDC_IDC_MSMT_LEADCHNL_RA_IMPEDANCE_VALUE: 642 OHM
MDC_IDC_MSMT_LEADCHNL_RA_PACING_THRESHOLD_AMPLITUDE: 0.4 V
MDC_IDC_MSMT_LEADCHNL_RA_PACING_THRESHOLD_PULSEWIDTH: 0.4 MS
MDC_IDC_MSMT_LEADCHNL_RV_IMPEDANCE_VALUE: 502 OHM
MDC_IDC_MSMT_LEADCHNL_RV_PACING_THRESHOLD_AMPLITUDE: 1.4 V
MDC_IDC_MSMT_LEADCHNL_RV_PACING_THRESHOLD_PULSEWIDTH: 0.4 MS
MDC_IDC_PG_IMPLANT_DTM: NORMAL
MDC_IDC_PG_MFG: NORMAL
MDC_IDC_PG_MODEL: NORMAL
MDC_IDC_PG_SERIAL: NORMAL
MDC_IDC_PG_TYPE: NORMAL
MDC_IDC_SESS_CLINIC_NAME: NORMAL
MDC_IDC_SESS_DTM: NORMAL
MDC_IDC_SESS_TYPE: NORMAL
MDC_IDC_SET_BRADY_AT_MODE_SWITCH_MODE: NORMAL
MDC_IDC_SET_BRADY_AT_MODE_SWITCH_RATE: 170 {BEATS}/MIN
MDC_IDC_SET_BRADY_LOWRATE: 60 {BEATS}/MIN
MDC_IDC_SET_BRADY_MAX_SENSOR_RATE: 130 {BEATS}/MIN
MDC_IDC_SET_BRADY_MAX_TRACKING_RATE: 130 {BEATS}/MIN
MDC_IDC_SET_BRADY_MODE: NORMAL
MDC_IDC_SET_BRADY_PAV_DELAY_LOW: 180 MS
MDC_IDC_SET_BRADY_SAV_DELAY_LOW: 80 MS
MDC_IDC_SET_CRT_PACED_CHAMBERS: NORMAL
MDC_IDC_SET_LEADCHNL_LV_PACING_AMPLITUDE: 2.6 V
MDC_IDC_SET_LEADCHNL_LV_PACING_ANODE_ELECTRODE_1: NORMAL
MDC_IDC_SET_LEADCHNL_LV_PACING_ANODE_LOCATION_1: NORMAL
MDC_IDC_SET_LEADCHNL_LV_PACING_CAPTURE_MODE: NORMAL
MDC_IDC_SET_LEADCHNL_LV_PACING_CATHODE_ELECTRODE_1: NORMAL
MDC_IDC_SET_LEADCHNL_LV_PACING_CATHODE_LOCATION_1: NORMAL
MDC_IDC_SET_LEADCHNL_LV_PACING_PULSEWIDTH: 0.5 MS
MDC_IDC_SET_LEADCHNL_LV_SENSING_ADAPTATION_MODE: NORMAL
MDC_IDC_SET_LEADCHNL_LV_SENSING_ANODE_ELECTRODE_1: NORMAL
MDC_IDC_SET_LEADCHNL_LV_SENSING_ANODE_LOCATION_1: NORMAL
MDC_IDC_SET_LEADCHNL_LV_SENSING_CATHODE_ELECTRODE_1: NORMAL
MDC_IDC_SET_LEADCHNL_LV_SENSING_CATHODE_LOCATION_1: NORMAL
MDC_IDC_SET_LEADCHNL_LV_SENSING_SENSITIVITY: 1 MV
MDC_IDC_SET_LEADCHNL_RA_PACING_AMPLITUDE: 2 V
MDC_IDC_SET_LEADCHNL_RA_PACING_CAPTURE_MODE: NORMAL
MDC_IDC_SET_LEADCHNL_RA_PACING_POLARITY: NORMAL
MDC_IDC_SET_LEADCHNL_RA_PACING_PULSEWIDTH: 0.4 MS
MDC_IDC_SET_LEADCHNL_RA_SENSING_ADAPTATION_MODE: NORMAL
MDC_IDC_SET_LEADCHNL_RA_SENSING_POLARITY: NORMAL
MDC_IDC_SET_LEADCHNL_RA_SENSING_SENSITIVITY: 0.25 MV
MDC_IDC_SET_LEADCHNL_RV_PACING_AMPLITUDE: 3 V
MDC_IDC_SET_LEADCHNL_RV_PACING_CAPTURE_MODE: NORMAL
MDC_IDC_SET_LEADCHNL_RV_PACING_POLARITY: NORMAL
MDC_IDC_SET_LEADCHNL_RV_PACING_PULSEWIDTH: 0.4 MS
MDC_IDC_SET_LEADCHNL_RV_SENSING_ADAPTATION_MODE: NORMAL
MDC_IDC_SET_LEADCHNL_RV_SENSING_POLARITY: NORMAL
MDC_IDC_SET_LEADCHNL_RV_SENSING_SENSITIVITY: 0.6 MV
MDC_IDC_SET_ZONE_DETECTION_INTERVAL: 250 MS
MDC_IDC_SET_ZONE_DETECTION_INTERVAL: 300 MS
MDC_IDC_SET_ZONE_DETECTION_INTERVAL: 353 MS
MDC_IDC_SET_ZONE_STATUS: NORMAL
MDC_IDC_SET_ZONE_TYPE: NORMAL
MDC_IDC_SET_ZONE_VENDOR_TYPE: NORMAL
MDC_IDC_STAT_AT_BURDEN_PERCENT: 1 %
MDC_IDC_STAT_AT_DTM_END: NORMAL
MDC_IDC_STAT_AT_DTM_START: NORMAL
MDC_IDC_STAT_BRADY_DTM_END: NORMAL
MDC_IDC_STAT_BRADY_DTM_START: NORMAL
MDC_IDC_STAT_BRADY_RA_PERCENT_PACED: 1 %
MDC_IDC_STAT_BRADY_RV_PERCENT_PACED: 39 %
MDC_IDC_STAT_CRT_DTM_END: NORMAL
MDC_IDC_STAT_CRT_DTM_START: NORMAL
MDC_IDC_STAT_CRT_LV_PERCENT_PACED: 99 %
MDC_IDC_STAT_EPISODE_RECENT_COUNT: 0
MDC_IDC_STAT_EPISODE_RECENT_COUNT_DTM_END: NORMAL
MDC_IDC_STAT_EPISODE_RECENT_COUNT_DTM_START: NORMAL
MDC_IDC_STAT_EPISODE_TYPE: NORMAL
MDC_IDC_STAT_EPISODE_VENDOR_TYPE: NORMAL
MDC_IDC_STAT_TACHYTHERAPY_ATP_DELIVERED_RECENT: 0
MDC_IDC_STAT_TACHYTHERAPY_ATP_DELIVERED_TOTAL: 0
MDC_IDC_STAT_TACHYTHERAPY_RECENT_DTM_END: NORMAL
MDC_IDC_STAT_TACHYTHERAPY_RECENT_DTM_START: NORMAL
MDC_IDC_STAT_TACHYTHERAPY_SHOCKS_ABORTED_RECENT: 0
MDC_IDC_STAT_TACHYTHERAPY_SHOCKS_ABORTED_TOTAL: 0
MDC_IDC_STAT_TACHYTHERAPY_SHOCKS_DELIVERED_RECENT: 0
MDC_IDC_STAT_TACHYTHERAPY_SHOCKS_DELIVERED_TOTAL: 0
MDC_IDC_STAT_TACHYTHERAPY_TOTAL_DTM_END: NORMAL
MDC_IDC_STAT_TACHYTHERAPY_TOTAL_DTM_START: NORMAL

## 2024-06-11 DIAGNOSIS — I25.5 ISCHEMIC CARDIOMYOPATHY: ICD-10-CM

## 2024-06-11 DIAGNOSIS — I50.43 ACUTE ON CHRONIC COMBINED SYSTOLIC (CONGESTIVE) AND DIASTOLIC (CONGESTIVE) HEART FAILURE (H): ICD-10-CM

## 2024-06-11 RX ORDER — SPIRONOLACTONE 25 MG/1
25 TABLET ORAL DAILY
Qty: 90 TABLET | Refills: 0 | Status: SHIPPED | OUTPATIENT
Start: 2024-06-11 | End: 2024-09-06

## 2024-06-23 ENCOUNTER — HEALTH MAINTENANCE LETTER (OUTPATIENT)
Age: 79
End: 2024-06-23

## 2024-07-12 DIAGNOSIS — Z95.1 S/P CABG (CORONARY ARTERY BYPASS GRAFT): ICD-10-CM

## 2024-07-12 RX ORDER — METOPROLOL SUCCINATE 25 MG/1
TABLET, EXTENDED RELEASE ORAL
Qty: 360 TABLET | Refills: 0 | Status: SHIPPED | OUTPATIENT
Start: 2024-07-12 | End: 2024-09-13

## 2024-08-13 DIAGNOSIS — I25.5 ISCHEMIC CARDIOMYOPATHY: ICD-10-CM

## 2024-08-13 DIAGNOSIS — I50.22 CHRONIC SYSTOLIC HEART FAILURE (H): ICD-10-CM

## 2024-08-13 RX ORDER — SACUBITRIL AND VALSARTAN 97; 103 MG/1; MG/1
1 TABLET, FILM COATED ORAL 2 TIMES DAILY
Qty: 180 TABLET | Refills: 1 | Status: SHIPPED | OUTPATIENT
Start: 2024-08-13

## 2024-09-06 DIAGNOSIS — I50.43 ACUTE ON CHRONIC COMBINED SYSTOLIC (CONGESTIVE) AND DIASTOLIC (CONGESTIVE) HEART FAILURE (H): ICD-10-CM

## 2024-09-06 DIAGNOSIS — I25.5 ISCHEMIC CARDIOMYOPATHY: ICD-10-CM

## 2024-09-06 RX ORDER — SPIRONOLACTONE 25 MG/1
25 TABLET ORAL DAILY
Qty: 90 TABLET | Refills: 0 | Status: SHIPPED | OUTPATIENT
Start: 2024-09-06

## 2024-09-13 DIAGNOSIS — Z95.1 S/P CABG (CORONARY ARTERY BYPASS GRAFT): ICD-10-CM

## 2024-09-13 RX ORDER — METOPROLOL SUCCINATE 25 MG/1
TABLET, EXTENDED RELEASE ORAL
Qty: 360 TABLET | Refills: 0 | Status: SHIPPED | OUTPATIENT
Start: 2024-09-13

## 2024-09-17 ENCOUNTER — ANCILLARY PROCEDURE (OUTPATIENT)
Dept: CARDIOLOGY | Facility: CLINIC | Age: 79
End: 2024-09-17
Attending: INTERNAL MEDICINE
Payer: COMMERCIAL

## 2024-09-17 DIAGNOSIS — I24.9 ACUTE CORONARY SYNDROME (H): ICD-10-CM

## 2024-09-17 DIAGNOSIS — I25.5 ISCHEMIC CARDIOMYOPATHY: ICD-10-CM

## 2024-09-17 DIAGNOSIS — I44.7 LEFT BUNDLE BRANCH BLOCK (LBBB): ICD-10-CM

## 2024-09-17 PROCEDURE — 93295 DEV INTERROG REMOTE 1/2/MLT: CPT | Performed by: INTERNAL MEDICINE

## 2024-09-17 PROCEDURE — 93296 REM INTERROG EVL PM/IDS: CPT | Performed by: INTERNAL MEDICINE

## 2024-09-24 LAB
MDC_IDC_LEAD_CONNECTION_STATUS: NORMAL
MDC_IDC_LEAD_IMPLANT_DT: NORMAL
MDC_IDC_LEAD_LOCATION: NORMAL
MDC_IDC_LEAD_LOCATION_DETAIL_1: NORMAL
MDC_IDC_LEAD_MFG: NORMAL
MDC_IDC_LEAD_MODEL: NORMAL
MDC_IDC_LEAD_POLARITY_TYPE: NORMAL
MDC_IDC_LEAD_SERIAL: NORMAL
MDC_IDC_MSMT_BATTERY_DTM: NORMAL
MDC_IDC_MSMT_BATTERY_REMAINING_LONGEVITY: 126 MO
MDC_IDC_MSMT_BATTERY_REMAINING_PERCENTAGE: 100 %
MDC_IDC_MSMT_BATTERY_STATUS: NORMAL
MDC_IDC_MSMT_CAP_CHARGE_DTM: NORMAL
MDC_IDC_MSMT_CAP_CHARGE_TIME: 10 S
MDC_IDC_MSMT_CAP_CHARGE_TYPE: NORMAL
MDC_IDC_MSMT_LEADCHNL_LV_IMPEDANCE_VALUE: 1501 OHM
MDC_IDC_MSMT_LEADCHNL_LV_PACING_THRESHOLD_AMPLITUDE: 1.3 V
MDC_IDC_MSMT_LEADCHNL_LV_PACING_THRESHOLD_PULSEWIDTH: 0.5 MS
MDC_IDC_MSMT_LEADCHNL_RA_IMPEDANCE_VALUE: 652 OHM
MDC_IDC_MSMT_LEADCHNL_RA_PACING_THRESHOLD_AMPLITUDE: 0.5 V
MDC_IDC_MSMT_LEADCHNL_RA_PACING_THRESHOLD_PULSEWIDTH: 0.4 MS
MDC_IDC_MSMT_LEADCHNL_RV_IMPEDANCE_VALUE: 489 OHM
MDC_IDC_MSMT_LEADCHNL_RV_PACING_THRESHOLD_AMPLITUDE: 1.4 V
MDC_IDC_MSMT_LEADCHNL_RV_PACING_THRESHOLD_PULSEWIDTH: 0.4 MS
MDC_IDC_PG_IMPLANT_DTM: NORMAL
MDC_IDC_PG_MFG: NORMAL
MDC_IDC_PG_MODEL: NORMAL
MDC_IDC_PG_SERIAL: NORMAL
MDC_IDC_PG_TYPE: NORMAL
MDC_IDC_SESS_CLINIC_NAME: NORMAL
MDC_IDC_SESS_DTM: NORMAL
MDC_IDC_SESS_TYPE: NORMAL
MDC_IDC_SET_BRADY_AT_MODE_SWITCH_MODE: NORMAL
MDC_IDC_SET_BRADY_AT_MODE_SWITCH_RATE: 170 {BEATS}/MIN
MDC_IDC_SET_BRADY_LOWRATE: 60 {BEATS}/MIN
MDC_IDC_SET_BRADY_MAX_SENSOR_RATE: 130 {BEATS}/MIN
MDC_IDC_SET_BRADY_MAX_TRACKING_RATE: 130 {BEATS}/MIN
MDC_IDC_SET_BRADY_MODE: NORMAL
MDC_IDC_SET_BRADY_PAV_DELAY_LOW: 180 MS
MDC_IDC_SET_BRADY_SAV_DELAY_LOW: 80 MS
MDC_IDC_SET_CRT_PACED_CHAMBERS: NORMAL
MDC_IDC_SET_LEADCHNL_LV_PACING_AMPLITUDE: 2.4 V
MDC_IDC_SET_LEADCHNL_LV_PACING_ANODE_ELECTRODE_1: NORMAL
MDC_IDC_SET_LEADCHNL_LV_PACING_ANODE_LOCATION_1: NORMAL
MDC_IDC_SET_LEADCHNL_LV_PACING_CAPTURE_MODE: NORMAL
MDC_IDC_SET_LEADCHNL_LV_PACING_CATHODE_ELECTRODE_1: NORMAL
MDC_IDC_SET_LEADCHNL_LV_PACING_CATHODE_LOCATION_1: NORMAL
MDC_IDC_SET_LEADCHNL_LV_PACING_PULSEWIDTH: 0.5 MS
MDC_IDC_SET_LEADCHNL_LV_SENSING_ADAPTATION_MODE: NORMAL
MDC_IDC_SET_LEADCHNL_LV_SENSING_ANODE_ELECTRODE_1: NORMAL
MDC_IDC_SET_LEADCHNL_LV_SENSING_ANODE_LOCATION_1: NORMAL
MDC_IDC_SET_LEADCHNL_LV_SENSING_CATHODE_ELECTRODE_1: NORMAL
MDC_IDC_SET_LEADCHNL_LV_SENSING_CATHODE_LOCATION_1: NORMAL
MDC_IDC_SET_LEADCHNL_LV_SENSING_SENSITIVITY: 1 MV
MDC_IDC_SET_LEADCHNL_RA_PACING_AMPLITUDE: 2 V
MDC_IDC_SET_LEADCHNL_RA_PACING_CAPTURE_MODE: NORMAL
MDC_IDC_SET_LEADCHNL_RA_PACING_POLARITY: NORMAL
MDC_IDC_SET_LEADCHNL_RA_PACING_PULSEWIDTH: 0.4 MS
MDC_IDC_SET_LEADCHNL_RA_SENSING_ADAPTATION_MODE: NORMAL
MDC_IDC_SET_LEADCHNL_RA_SENSING_POLARITY: NORMAL
MDC_IDC_SET_LEADCHNL_RA_SENSING_SENSITIVITY: 0.25 MV
MDC_IDC_SET_LEADCHNL_RV_PACING_AMPLITUDE: 3 V
MDC_IDC_SET_LEADCHNL_RV_PACING_CAPTURE_MODE: NORMAL
MDC_IDC_SET_LEADCHNL_RV_PACING_POLARITY: NORMAL
MDC_IDC_SET_LEADCHNL_RV_PACING_PULSEWIDTH: 0.4 MS
MDC_IDC_SET_LEADCHNL_RV_SENSING_ADAPTATION_MODE: NORMAL
MDC_IDC_SET_LEADCHNL_RV_SENSING_POLARITY: NORMAL
MDC_IDC_SET_LEADCHNL_RV_SENSING_SENSITIVITY: 0.6 MV
MDC_IDC_SET_ZONE_DETECTION_INTERVAL: 250 MS
MDC_IDC_SET_ZONE_DETECTION_INTERVAL: 300 MS
MDC_IDC_SET_ZONE_DETECTION_INTERVAL: 353 MS
MDC_IDC_SET_ZONE_STATUS: NORMAL
MDC_IDC_SET_ZONE_TYPE: NORMAL
MDC_IDC_SET_ZONE_VENDOR_TYPE: NORMAL
MDC_IDC_STAT_AT_BURDEN_PERCENT: 1 %
MDC_IDC_STAT_AT_DTM_END: NORMAL
MDC_IDC_STAT_AT_DTM_START: NORMAL
MDC_IDC_STAT_BRADY_DTM_END: NORMAL
MDC_IDC_STAT_BRADY_DTM_START: NORMAL
MDC_IDC_STAT_BRADY_RA_PERCENT_PACED: 1 %
MDC_IDC_STAT_BRADY_RV_PERCENT_PACED: 38 %
MDC_IDC_STAT_CRT_DTM_END: NORMAL
MDC_IDC_STAT_CRT_DTM_START: NORMAL
MDC_IDC_STAT_CRT_LV_PERCENT_PACED: 99 %
MDC_IDC_STAT_EPISODE_RECENT_COUNT: 0
MDC_IDC_STAT_EPISODE_RECENT_COUNT: 2
MDC_IDC_STAT_EPISODE_RECENT_COUNT_DTM_END: NORMAL
MDC_IDC_STAT_EPISODE_RECENT_COUNT_DTM_START: NORMAL
MDC_IDC_STAT_EPISODE_TYPE: NORMAL
MDC_IDC_STAT_EPISODE_VENDOR_TYPE: NORMAL
MDC_IDC_STAT_TACHYTHERAPY_ATP_DELIVERED_RECENT: 0
MDC_IDC_STAT_TACHYTHERAPY_ATP_DELIVERED_TOTAL: 0
MDC_IDC_STAT_TACHYTHERAPY_RECENT_DTM_END: NORMAL
MDC_IDC_STAT_TACHYTHERAPY_RECENT_DTM_START: NORMAL
MDC_IDC_STAT_TACHYTHERAPY_SHOCKS_ABORTED_RECENT: 0
MDC_IDC_STAT_TACHYTHERAPY_SHOCKS_ABORTED_TOTAL: 0
MDC_IDC_STAT_TACHYTHERAPY_SHOCKS_DELIVERED_RECENT: 0
MDC_IDC_STAT_TACHYTHERAPY_SHOCKS_DELIVERED_TOTAL: 0
MDC_IDC_STAT_TACHYTHERAPY_TOTAL_DTM_END: NORMAL
MDC_IDC_STAT_TACHYTHERAPY_TOTAL_DTM_START: NORMAL

## 2024-10-10 ENCOUNTER — HOSPITAL ENCOUNTER (OUTPATIENT)
Dept: CARDIOLOGY | Facility: CLINIC | Age: 79
Discharge: HOME | End: 2024-10-10
Attending: INTERNAL MEDICINE | Admitting: INTERNAL MEDICINE
Payer: COMMERCIAL

## 2024-10-10 DIAGNOSIS — I50.22 CHRONIC SYSTOLIC HEART FAILURE (H): ICD-10-CM

## 2024-10-10 LAB — LVEF ECHO: NORMAL

## 2024-10-10 PROCEDURE — 93306 TTE W/DOPPLER COMPLETE: CPT | Mod: 26 | Performed by: INTERNAL MEDICINE

## 2024-10-10 PROCEDURE — 93306 TTE W/DOPPLER COMPLETE: CPT

## 2024-10-11 DIAGNOSIS — R35.0 BENIGN PROSTATIC HYPERPLASIA WITH URINARY FREQUENCY: ICD-10-CM

## 2024-10-11 DIAGNOSIS — N40.1 BENIGN PROSTATIC HYPERPLASIA WITH URINARY FREQUENCY: ICD-10-CM

## 2024-10-14 RX ORDER — FINASTERIDE 5 MG/1
1 TABLET, FILM COATED ORAL DAILY
Qty: 90 TABLET | Refills: 3 | OUTPATIENT
Start: 2024-10-14

## 2024-10-23 DIAGNOSIS — E11.8 TYPE 2 DIABETES MELLITUS WITH COMPLICATION, WITHOUT LONG-TERM CURRENT USE OF INSULIN (H): ICD-10-CM

## 2024-10-24 RX ORDER — GLIPIZIDE 10 MG/1
10 TABLET, FILM COATED, EXTENDED RELEASE ORAL EVERY MORNING
Qty: 90 TABLET | Refills: 3 | Status: SHIPPED | OUTPATIENT
Start: 2024-10-24

## 2024-11-15 DIAGNOSIS — Z95.1 S/P CABG (CORONARY ARTERY BYPASS GRAFT): ICD-10-CM

## 2024-11-15 RX ORDER — METOPROLOL SUCCINATE 25 MG/1
TABLET, EXTENDED RELEASE ORAL
Qty: 360 TABLET | Refills: 0 | Status: SHIPPED | OUTPATIENT
Start: 2024-11-15

## 2024-12-01 SDOH — HEALTH STABILITY: PHYSICAL HEALTH: ON AVERAGE, HOW MANY MINUTES DO YOU ENGAGE IN EXERCISE AT THIS LEVEL?: 40 MIN

## 2024-12-01 SDOH — HEALTH STABILITY: PHYSICAL HEALTH: ON AVERAGE, HOW MANY DAYS PER WEEK DO YOU ENGAGE IN MODERATE TO STRENUOUS EXERCISE (LIKE A BRISK WALK)?: 4 DAYS

## 2024-12-01 ASSESSMENT — SOCIAL DETERMINANTS OF HEALTH (SDOH): HOW OFTEN DO YOU GET TOGETHER WITH FRIENDS OR RELATIVES?: MORE THAN THREE TIMES A WEEK

## 2024-12-02 ENCOUNTER — OFFICE VISIT (OUTPATIENT)
Dept: INTERNAL MEDICINE | Facility: CLINIC | Age: 79
End: 2024-12-02
Payer: COMMERCIAL

## 2024-12-02 VITALS
BODY MASS INDEX: 23.39 KG/M2 | WEIGHT: 154.3 LBS | TEMPERATURE: 96.8 F | OXYGEN SATURATION: 99 % | RESPIRATION RATE: 16 BRPM | DIASTOLIC BLOOD PRESSURE: 80 MMHG | HEART RATE: 62 BPM | HEIGHT: 68 IN | SYSTOLIC BLOOD PRESSURE: 131 MMHG

## 2024-12-02 DIAGNOSIS — E78.5 HYPERLIPIDEMIA WITH TARGET LDL LESS THAN 70: ICD-10-CM

## 2024-12-02 DIAGNOSIS — Z12.11 COLON CANCER SCREENING: ICD-10-CM

## 2024-12-02 DIAGNOSIS — N18.31 STAGE 3A CHRONIC KIDNEY DISEASE (H): ICD-10-CM

## 2024-12-02 DIAGNOSIS — Z23 NEED FOR SHINGLES VACCINE: ICD-10-CM

## 2024-12-02 DIAGNOSIS — I50.43 ACUTE ON CHRONIC COMBINED SYSTOLIC (CONGESTIVE) AND DIASTOLIC (CONGESTIVE) HEART FAILURE (H): ICD-10-CM

## 2024-12-02 DIAGNOSIS — I25.5 ISCHEMIC CARDIOMYOPATHY: ICD-10-CM

## 2024-12-02 DIAGNOSIS — R09.82 POSTNASAL DRIP: ICD-10-CM

## 2024-12-02 DIAGNOSIS — N40.1 BENIGN PROSTATIC HYPERPLASIA WITH URINARY FREQUENCY: ICD-10-CM

## 2024-12-02 DIAGNOSIS — R35.0 BENIGN PROSTATIC HYPERPLASIA WITH URINARY FREQUENCY: ICD-10-CM

## 2024-12-02 DIAGNOSIS — E11.69 TYPE 2 DIABETES MELLITUS WITH OTHER SPECIFIED COMPLICATION, WITHOUT LONG-TERM CURRENT USE OF INSULIN (H): ICD-10-CM

## 2024-12-02 DIAGNOSIS — D62 ANEMIA DUE TO BLOOD LOSS, ACUTE: ICD-10-CM

## 2024-12-02 DIAGNOSIS — Z00.00 ENCOUNTER FOR PREVENTATIVE ADULT HEALTH CARE EXAMINATION: Primary | ICD-10-CM

## 2024-12-02 DIAGNOSIS — N18.32 STAGE 3B CHRONIC KIDNEY DISEASE (H): ICD-10-CM

## 2024-12-02 DIAGNOSIS — Z12.5 SCREENING FOR PROSTATE CANCER: ICD-10-CM

## 2024-12-02 DIAGNOSIS — I10 HYPERTENSION GOAL BP (BLOOD PRESSURE) < 140/90: ICD-10-CM

## 2024-12-02 LAB
ERYTHROCYTE [DISTWIDTH] IN BLOOD BY AUTOMATED COUNT: 14 % (ref 10–15)
EST. AVERAGE GLUCOSE BLD GHB EST-MCNC: 146 MG/DL
HBA1C MFR BLD: 6.7 % (ref 0–5.6)
HCT VFR BLD AUTO: 44.2 % (ref 40–53)
HGB BLD-MCNC: 14.5 G/DL (ref 13.3–17.7)
MCH RBC QN AUTO: 29.5 PG (ref 26.5–33)
MCHC RBC AUTO-ENTMCNC: 32.8 G/DL (ref 31.5–36.5)
MCV RBC AUTO: 90 FL (ref 78–100)
PLATELET # BLD AUTO: 213 10E3/UL (ref 150–450)
RBC # BLD AUTO: 4.92 10E6/UL (ref 4.4–5.9)
WBC # BLD AUTO: 8.3 10E3/UL (ref 4–11)

## 2024-12-02 PROCEDURE — 80061 LIPID PANEL: CPT | Performed by: INTERNAL MEDICINE

## 2024-12-02 PROCEDURE — 90662 IIV NO PRSV INCREASED AG IM: CPT | Mod: GZ | Performed by: INTERNAL MEDICINE

## 2024-12-02 PROCEDURE — 83036 HEMOGLOBIN GLYCOSYLATED A1C: CPT | Performed by: INTERNAL MEDICINE

## 2024-12-02 PROCEDURE — 36415 COLL VENOUS BLD VENIPUNCTURE: CPT | Performed by: INTERNAL MEDICINE

## 2024-12-02 PROCEDURE — 82043 UR ALBUMIN QUANTITATIVE: CPT | Performed by: INTERNAL MEDICINE

## 2024-12-02 PROCEDURE — 99214 OFFICE O/P EST MOD 30 MIN: CPT | Mod: 25 | Performed by: INTERNAL MEDICINE

## 2024-12-02 PROCEDURE — 80053 COMPREHEN METABOLIC PANEL: CPT | Performed by: INTERNAL MEDICINE

## 2024-12-02 PROCEDURE — G0103 PSA SCREENING: HCPCS | Performed by: INTERNAL MEDICINE

## 2024-12-02 PROCEDURE — 84439 ASSAY OF FREE THYROXINE: CPT | Performed by: INTERNAL MEDICINE

## 2024-12-02 PROCEDURE — 85027 COMPLETE CBC AUTOMATED: CPT | Performed by: INTERNAL MEDICINE

## 2024-12-02 PROCEDURE — G0008 ADMIN INFLUENZA VIRUS VAC: HCPCS | Performed by: INTERNAL MEDICINE

## 2024-12-02 PROCEDURE — 82570 ASSAY OF URINE CREATININE: CPT | Performed by: INTERNAL MEDICINE

## 2024-12-02 PROCEDURE — G0439 PPPS, SUBSEQ VISIT: HCPCS | Performed by: INTERNAL MEDICINE

## 2024-12-02 PROCEDURE — 84443 ASSAY THYROID STIM HORMONE: CPT | Performed by: INTERNAL MEDICINE

## 2024-12-02 RX ORDER — SPIRONOLACTONE 25 MG/1
25 TABLET ORAL DAILY
Qty: 90 TABLET | Refills: 3 | Status: SHIPPED | OUTPATIENT
Start: 2024-12-02

## 2024-12-02 RX ORDER — ATORVASTATIN CALCIUM 40 MG/1
40 TABLET, FILM COATED ORAL DAILY
Qty: 90 TABLET | Refills: 3 | Status: SHIPPED | OUTPATIENT
Start: 2024-12-02

## 2024-12-02 RX ORDER — FINASTERIDE 5 MG/1
1 TABLET, FILM COATED ORAL DAILY
Qty: 90 TABLET | Refills: 3 | Status: SHIPPED | OUTPATIENT
Start: 2024-12-02

## 2024-12-02 RX ORDER — FLUTICASONE PROPIONATE 50 MCG
1 SPRAY, SUSPENSION (ML) NASAL DAILY
COMMUNITY
Start: 2024-12-02

## 2024-12-02 RX ORDER — SPIRONOLACTONE 25 MG/1
25 TABLET ORAL DAILY
Qty: 90 TABLET | Refills: 0 | Status: SHIPPED | OUTPATIENT
Start: 2024-12-02

## 2024-12-02 ASSESSMENT — PAIN SCALES - GENERAL: PAINLEVEL_OUTOF10: NO PAIN (0)

## 2024-12-02 NOTE — PROGRESS NOTES
Preventive Care Visit  [facility]  Alvaro Lindo MD, Internal Medicine  Dec 2, 2024      Assessment & Plan     Ischemic cardiomyopathy  Follow up with cardiology , compensated   - atorvastatin (LIPITOR) 40 MG tablet; Take 1 tablet (40 mg) by mouth daily.  - spironolactone (ALDACTONE) 25 MG tablet; Take 1 tablet (25 mg) by mouth daily.    Benign prostatic hyperplasia with urinary frequency  On treatment, controlled   - finasteride (PROSCAR) 5 MG tablet; Take 1 tablet (5 mg) by mouth daily.  - OFFICE/OUTPT VISIT,EST,LEVL III    Acute on chronic combined systolic (congestive) and diastolic (congestive) heart failure (H)     - spironolactone (ALDACTONE) 25 MG tablet; Take 1 tablet (25 mg) by mouth daily.      Type 2 diabetes mellitus with other specified complication, without long-term current use of insulin (H)  Assess lab  Continue treatment   - HEMOGLOBIN A1C  - OFFICE/OUTPT VISIT,EST,LEVL III    Stage 3a chronic kidney disease (H)  Monitor lab   - Albumin Random Urine Quantitative with Creat Ratio  - OFFICE/OUTPT VISIT,EST,LEVL III    Hyperlipidemia with target LDL less than 70  On statin, assess lab   - Lipid panel reflex to direct LDL Fasting  - OFFICE/OUTPT VISIT,EST,LEVL III    Anemia due to blood loss, acute  Related to CKD  - CBC with Platelets  - OFFICE/OUTPT VISIT,EST,LEVL III    Encounter for preventative adult health care examination  advised regular aerobic activity, low cholesterol, low salt diet, wearing seat belt,  self examinations, sunscreen protection.Obtain screening cholesterol, immunizations reviewed.    - Lipid panel reflex to direct LDL Fasting  - COLOGUARD([employer]); Future  - fluticasone (FLONASE) 50 MCG/ACT nasal spray; Spray 1 spray into both nostrils daily.  - Comprehensive metabolic panel (BMP + Alb, Alk Phos, ALT, AST, Total. Bili, TP)  - TSH with free T4 reflex  - PSA, screen    Screening for prostate cancer    - PSA, screen    Hypertension goal BP  (blood pressure) < 140/90  Controlled on treatment   - Comprehensive metabolic panel (BMP + Alb, Alk Phos, ALT, AST, Total. Bili, TP)  - TSH with free T4 reflex  - OFFICE/OUTPT VISIT,EST,LEVL III    Postnasal drip  Trial of nasal steroid   - fluticasone (FLONASE) 50 MCG/ACT nasal spray; Spray 1 spray into both nostrils daily.  - OFFICE/OUTPT VISIT,EST,LEVL III    Colon cancer screening    - BREANNA([employer]); Future    Stage 3b chronic kidney disease (H)  Keep hydrated, avoid NSAID use     Patient has been advised of split billing requirements and indicates understanding: Yes        Counseling  Appropriate preventive services were addressed with this patient via screening, questionnaire, or discussion as appropriate for fall prevention, nutrition, physical activity, Tobacco-use cessation, social engagement, weight loss and cognition.  Checklist reviewing preventive services available has been given to the patient.  Reviewed patient's diet, addressing concerns and/or questions.       See Patient Instructions    Silvia Galeas is a 79 year old, presenting for the following:  Wellness Visit (fasting)        12/2/2024     3:54 PM   Additional Questions   Roomed by Ruthie NEELY   Accompanied by wife           HPI    Has h/o HTN. on medical treatment. BP has been controlled. No side effects from medications. No CP, HA, dizziness. good compliance with medications and low salt diet.  Has h/o ischemic heart disease, asymptomatic regarding chest pains, SOB,palpitations. Has good compliance with treatment, diet and exercise.  Has H/O hyperlipidemia. On medical treatment and diet. No side effects. No muscle weakness, myalgias or upset stomach.   Has  history of  DM. On diet , exercise and oral treatment . Blood sugars are controlled. Checking 1 time daily. No paresthesias. No hypoglycemias.  Has H/O BPH. On treatment with Proscar . Has mild symptoms of frequency, decreased urinary stream , no dysuria. No side effects  from medications.  Concern for postnasal drip, chronic cough, throat clearing.         Health Care Directive  Patient does not have a Health Care Directive: Discussed advance care planning with patient; however, patient declined at this time.      12/1/2024   General Health   How would you rate your overall physical health? Good   Feel stress (tense, anxious, or unable to sleep) Not at all            12/1/2024   Nutrition   Diet: Low salt    Low fat/cholesterol    Diabetic    Carbohydrate counting    Vegetarian/vegan       Multiple values from one day are sorted in reverse-chronological order         12/1/2024   Exercise   Days per week of moderate/strenous exercise 4 days   Average minutes spent exercising at this level 40 min            12/1/2024   Social Factors   Frequency of gathering with friends or relatives More than three times a week   Worry food won't last until get money to buy more No   Food not last or not have enough money for food? No   Do you have housing? (Housing is defined as stable permanent housing and does not include staying ouside in a car, in a tent, in an abandoned building, in an overnight shelter, or couch-surfing.) Yes   Are you worried about losing your housing? No   Lack of transportation? No   Unable to get utilities (heat,electricity)? No            12/1/2024   Fall Risk   Fallen 2 or more times in the past year? No     No    Trouble with walking or balance? No     No        Patient-reported    Multiple values from one day are sorted in reverse-chronological order          12/1/2024   Activities of Daily Living- Home Safety   Needs help with the following daily activites None of the above   Safety concerns in the home None of the above            12/1/2024   Dental   Dentist two times every year? Yes            12/1/2024   Hearing Screening   Hearing concerns? None of the above            12/1/2024   Driving Risk Screening   Patient/family members have concerns about driving No             12/1/2024   General Alertness/Fatigue Screening   Have you been more tired than usual lately? No            12/1/2024   Urinary Incontinence Screening   Bothered by leaking urine in past 6 months No            12/1/2024   TB Screening   Were you born outside of the [place]? Yes            Today's PHQ-2 Score:       12/1/2024    10:31 AM   PHQ-2 ( 1999 [employer])   Q1: Little interest or pleasure in doing things 0    Q2: Feeling down, depressed or hopeless 0    PHQ-2 Score 0    Q1: Little interest or pleasure in doing things Not at all   Q2: Feeling down, depressed or hopeless Not at all   PHQ-2 Score 0       Patient-reported           12/1/2024   Substance Use   Alcohol more than 3/day or more than 7/wk Not Applicable   Do you have a current opioid prescription? No   How severe/bad is pain from 1 to 10? 0/10 (No Pain)   Do you use any other substances recreationally? No        Social History     Tobacco Use    Smoking status: Never    Smokeless tobacco: Never   Vaping Use    Vaping status: Never Used   Substance Use Topics    Alcohol use: Not Currently     Comment: 1-2x in 6 months    Drug use: No                 Reviewed and updated as needed this visit by Provider                    Lab work is in process  Labs reviewed in EPIC  Current providers sharing in care for this patient include:  Patient Care Team:  Alvaro Lindo MD as PCP - General (Internal Medicine)  Alvaro Lindo MD as Assigned PCP  Hector Hlal MD as MD (Cardiology)  Flavio Garcia MD as Assigned Heart and Vascular Provider  Vy Funk PA-C as Physician Assistant (Cardiovascular Disease)    The following health maintenance items are reviewed in Epic and correct as of today:  Health Maintenance   Topic Date Due    HF ACTION PLAN  Never done    ZOSTER IMMUNIZATION (2 of 3) 08/11/2009    EYE EXAM  07/01/2020    DIABETIC FOOT EXAM  05/17/2023    MICROALBUMIN  10/31/2023    A1C  05/28/2024    BMP  05/28/2024    ANNUAL  "REVIEW OF HM ORDERS  07/10/2024    INFLUENZA VACCINE (1) 09/01/2024    COVID-19 Vaccine (6 - 2024-25 season) 09/01/2024    ALT  11/28/2024    LIPID  11/28/2024    CBC  11/28/2024    MEDICARE ANNUAL WELLNESS VISIT  11/28/2024    HEMOGLOBIN  11/28/2024    FALL RISK ASSESSMENT  12/02/2025    ADVANCE CARE PLANNING  10/31/2027    DTAP/TDAP/TD IMMUNIZATION (3 - Td or Tdap) 10/10/2028    TSH W/FREE T4 REFLEX  Completed    PHQ-2 (once per calendar year)  Completed    Pneumococcal Vaccine: 65+ Years  Completed    URINALYSIS  Completed    RSV VACCINE  Completed    HPV IMMUNIZATION  Aged Out    MENINGITIS IMMUNIZATION  Aged Out    RSV MONOCLONAL ANTIBODY  Aged Out    COLORECTAL CANCER SCREENING  Discontinued         Review of Systems  Constitutional, HEENT, cardiovascular, pulmonary, GI, , musculoskeletal, neuro, skin, endocrine and psych systems are negative, except as otherwise noted.     Objective    Exam  /80 (BP Location: Left arm, Cuff Size: Adult Regular)   Pulse 62   Temp 96.8  F (36  C) (Tympanic)   Resp 16   Ht 1.727 m (5' 8\")   Wt 70 kg (154 lb 4.8 oz)   SpO2 99%   BMI 23.46 kg/m     Estimated body mass index is 23.46 kg/m  as calculated from the following:    Height as of this encounter: 1.727 m (5' 8\").    Weight as of this encounter: 70 kg (154 lb 4.8 oz).    Physical Exam  GENERAL: alert and no distress  EYES: Eyes grossly normal to inspection, PERRL and conjunctivae and sclerae normal  HENT: ear canals and TM's normal, nose and mouth without ulcers or lesions  NECK: no adenopathy, no asymmetry, masses, or scars  RESP: lungs clear to auscultation - no rales, rhonchi or wheezes  CV: regular rate and rhythm, normal S1 S2, no S3 or S4, no murmur, click or rub, no peripheral edema  ABDOMEN: soft, nontender, no hepatosplenomegaly, no masses and bowel sounds normal  MS: no gross musculoskeletal defects noted, no edema  SKIN: no suspicious lesions or rashes  NEURO: Normal strength and tone, mentation " intact and speech normal  PSYCH: mentation appears normal, affect normal/bright         12/2/2024   Mini Cog   Clock Draw Score 2 Normal   3 Item Recall 2 objects recalled   Mini Cog Total Score 4                 Signed Electronically by: Alvaro Lindo MD

## 2024-12-03 LAB
ALBUMIN SERPL BCG-MCNC: 4.1 G/DL (ref 3.5–5.2)
ALP SERPL-CCNC: 86 U/L (ref 40–150)
ALT SERPL W P-5'-P-CCNC: 17 U/L (ref 0–70)
ANION GAP SERPL CALCULATED.3IONS-SCNC: 10 MMOL/L (ref 7–15)
AST SERPL W P-5'-P-CCNC: 17 U/L (ref 0–45)
BILIRUB SERPL-MCNC: 0.8 MG/DL
BUN SERPL-MCNC: 21.5 MG/DL (ref 8–23)
CALCIUM SERPL-MCNC: 9.6 MG/DL (ref 8.8–10.4)
CHLORIDE SERPL-SCNC: 103 MMOL/L (ref 98–107)
CHOLEST SERPL-MCNC: 111 MG/DL
CREAT SERPL-MCNC: 1.83 MG/DL (ref 0.67–1.17)
CREAT UR-MCNC: 33.5 MG/DL
EGFRCR SERPLBLD CKD-EPI 2021: 37 ML/MIN/1.73M2
FASTING STATUS PATIENT QL REPORTED: YES
FASTING STATUS PATIENT QL REPORTED: YES
GLUCOSE SERPL-MCNC: 68 MG/DL (ref 70–99)
HCO3 SERPL-SCNC: 24 MMOL/L (ref 22–29)
HDLC SERPL-MCNC: 33 MG/DL
LDLC SERPL CALC-MCNC: 61 MG/DL
MICROALBUMIN UR-MCNC: 110 MG/L
MICROALBUMIN/CREAT UR: 328.36 MG/G CR (ref 0–17)
NONHDLC SERPL-MCNC: 78 MG/DL
POTASSIUM SERPL-SCNC: 4.6 MMOL/L (ref 3.4–5.3)
PROT SERPL-MCNC: 7.3 G/DL (ref 6.4–8.3)
PSA SERPL DL<=0.01 NG/ML-MCNC: 1.72 NG/ML
SODIUM SERPL-SCNC: 137 MMOL/L (ref 135–145)
T4 FREE SERPL-MCNC: 1.32 NG/DL (ref 0.9–1.7)
TRIGL SERPL-MCNC: 83 MG/DL
TSH SERPL DL<=0.005 MIU/L-ACNC: 4.67 UIU/ML (ref 0.3–4.2)

## 2024-12-04 ENCOUNTER — TELEPHONE (OUTPATIENT)
Dept: INTERNAL MEDICINE | Facility: CLINIC | Age: 79
End: 2024-12-04
Payer: COMMERCIAL

## 2024-12-04 DIAGNOSIS — R79.89 ELEVATED SERUM CREATININE: Primary | ICD-10-CM

## 2024-12-04 NOTE — TELEPHONE ENCOUNTER
Attempt #1     Left voicemail for patient to call clinic back. Sent [employer] message as well. Upon call back please relay provider's message below.    ------    Decreased kidney function, slightly worsened. Keep hydrated, avoid NSAID use.  Follow up lab in 1 month.  Rest of the results are normal.

## 2024-12-17 ENCOUNTER — OFFICE VISIT (OUTPATIENT)
Dept: CARDIOLOGY | Facility: CLINIC | Age: 79
End: 2024-12-17
Payer: COMMERCIAL

## 2024-12-17 ENCOUNTER — ANCILLARY PROCEDURE (OUTPATIENT)
Dept: CARDIOLOGY | Facility: CLINIC | Age: 79
End: 2024-12-17
Attending: INTERNAL MEDICINE
Payer: COMMERCIAL

## 2024-12-17 VITALS
BODY MASS INDEX: 23.79 KG/M2 | WEIGHT: 157 LBS | SYSTOLIC BLOOD PRESSURE: 115 MMHG | DIASTOLIC BLOOD PRESSURE: 74 MMHG | HEIGHT: 68 IN | HEART RATE: 61 BPM

## 2024-12-17 DIAGNOSIS — I25.5 ISCHEMIC CARDIOMYOPATHY: Primary | ICD-10-CM

## 2024-12-17 DIAGNOSIS — I24.9 ACUTE CORONARY SYNDROME (H): ICD-10-CM

## 2024-12-17 DIAGNOSIS — I25.5 ISCHEMIC CARDIOMYOPATHY: ICD-10-CM

## 2024-12-17 DIAGNOSIS — Z95.1 S/P CABG (CORONARY ARTERY BYPASS GRAFT): ICD-10-CM

## 2024-12-17 DIAGNOSIS — I44.7 LEFT BUNDLE BRANCH BLOCK (LBBB): ICD-10-CM

## 2024-12-17 DIAGNOSIS — I50.22 CHRONIC SYSTOLIC HEART FAILURE (H): ICD-10-CM

## 2024-12-17 LAB
MDC_IDC_LEAD_CONNECTION_STATUS: NORMAL
MDC_IDC_LEAD_IMPLANT_DT: NORMAL
MDC_IDC_LEAD_LOCATION: NORMAL
MDC_IDC_LEAD_LOCATION_DETAIL_1: NORMAL
MDC_IDC_LEAD_MFG: NORMAL
MDC_IDC_LEAD_MODEL: NORMAL
MDC_IDC_LEAD_POLARITY_TYPE: NORMAL
MDC_IDC_LEAD_SERIAL: NORMAL
MDC_IDC_MSMT_BATTERY_DTM: NORMAL
MDC_IDC_MSMT_BATTERY_REMAINING_LONGEVITY: 132 MO
MDC_IDC_MSMT_BATTERY_REMAINING_PERCENTAGE: 100 %
MDC_IDC_MSMT_BATTERY_STATUS: NORMAL
MDC_IDC_MSMT_CAP_CHARGE_DTM: NORMAL
MDC_IDC_MSMT_CAP_CHARGE_TIME: 10 S
MDC_IDC_MSMT_CAP_CHARGE_TYPE: NORMAL
MDC_IDC_MSMT_LEADCHNL_LV_IMPEDANCE_VALUE: 1460 OHM
MDC_IDC_MSMT_LEADCHNL_LV_PACING_THRESHOLD_AMPLITUDE: 1.2 V
MDC_IDC_MSMT_LEADCHNL_LV_PACING_THRESHOLD_PULSEWIDTH: 0.5 MS
MDC_IDC_MSMT_LEADCHNL_RA_IMPEDANCE_VALUE: 634 OHM
MDC_IDC_MSMT_LEADCHNL_RA_PACING_THRESHOLD_AMPLITUDE: 0.8 V
MDC_IDC_MSMT_LEADCHNL_RA_PACING_THRESHOLD_PULSEWIDTH: 0.4 MS
MDC_IDC_MSMT_LEADCHNL_RV_IMPEDANCE_VALUE: 482 OHM
MDC_IDC_MSMT_LEADCHNL_RV_PACING_THRESHOLD_AMPLITUDE: 1.4 V
MDC_IDC_MSMT_LEADCHNL_RV_PACING_THRESHOLD_PULSEWIDTH: 0.4 MS
MDC_IDC_PG_IMPLANT_DTM: NORMAL
MDC_IDC_PG_MFG: NORMAL
MDC_IDC_PG_MODEL: NORMAL
MDC_IDC_PG_SERIAL: NORMAL
MDC_IDC_PG_TYPE: NORMAL
MDC_IDC_SESS_CLINIC_NAME: NORMAL
MDC_IDC_SESS_DTM: NORMAL
MDC_IDC_SESS_TYPE: NORMAL
MDC_IDC_SET_BRADY_AT_MODE_SWITCH_MODE: NORMAL
MDC_IDC_SET_BRADY_AT_MODE_SWITCH_RATE: 170 {BEATS}/MIN
MDC_IDC_SET_BRADY_LOWRATE: 60 {BEATS}/MIN
MDC_IDC_SET_BRADY_MAX_SENSOR_RATE: 130 {BEATS}/MIN
MDC_IDC_SET_BRADY_MAX_TRACKING_RATE: 130 {BEATS}/MIN
MDC_IDC_SET_BRADY_MODE: NORMAL
MDC_IDC_SET_BRADY_PAV_DELAY_LOW: 180 MS
MDC_IDC_SET_BRADY_SAV_DELAY_LOW: 80 MS
MDC_IDC_SET_CRT_PACED_CHAMBERS: NORMAL
MDC_IDC_SET_LEADCHNL_LV_PACING_AMPLITUDE: 2.4 V
MDC_IDC_SET_LEADCHNL_LV_PACING_ANODE_ELECTRODE_1: NORMAL
MDC_IDC_SET_LEADCHNL_LV_PACING_ANODE_LOCATION_1: NORMAL
MDC_IDC_SET_LEADCHNL_LV_PACING_CAPTURE_MODE: NORMAL
MDC_IDC_SET_LEADCHNL_LV_PACING_CATHODE_ELECTRODE_1: NORMAL
MDC_IDC_SET_LEADCHNL_LV_PACING_CATHODE_LOCATION_1: NORMAL
MDC_IDC_SET_LEADCHNL_LV_PACING_PULSEWIDTH: 0.5 MS
MDC_IDC_SET_LEADCHNL_LV_SENSING_ADAPTATION_MODE: NORMAL
MDC_IDC_SET_LEADCHNL_LV_SENSING_ANODE_ELECTRODE_1: NORMAL
MDC_IDC_SET_LEADCHNL_LV_SENSING_ANODE_LOCATION_1: NORMAL
MDC_IDC_SET_LEADCHNL_LV_SENSING_CATHODE_ELECTRODE_1: NORMAL
MDC_IDC_SET_LEADCHNL_LV_SENSING_CATHODE_LOCATION_1: NORMAL
MDC_IDC_SET_LEADCHNL_LV_SENSING_SENSITIVITY: 1 MV
MDC_IDC_SET_LEADCHNL_RA_PACING_AMPLITUDE: 2 V
MDC_IDC_SET_LEADCHNL_RA_PACING_CAPTURE_MODE: NORMAL
MDC_IDC_SET_LEADCHNL_RA_PACING_POLARITY: NORMAL
MDC_IDC_SET_LEADCHNL_RA_PACING_PULSEWIDTH: 0.4 MS
MDC_IDC_SET_LEADCHNL_RA_SENSING_ADAPTATION_MODE: NORMAL
MDC_IDC_SET_LEADCHNL_RA_SENSING_POLARITY: NORMAL
MDC_IDC_SET_LEADCHNL_RA_SENSING_SENSITIVITY: 0.25 MV
MDC_IDC_SET_LEADCHNL_RV_PACING_AMPLITUDE: 3.2 V
MDC_IDC_SET_LEADCHNL_RV_PACING_CAPTURE_MODE: NORMAL
MDC_IDC_SET_LEADCHNL_RV_PACING_POLARITY: NORMAL
MDC_IDC_SET_LEADCHNL_RV_PACING_PULSEWIDTH: 0.4 MS
MDC_IDC_SET_LEADCHNL_RV_SENSING_ADAPTATION_MODE: NORMAL
MDC_IDC_SET_LEADCHNL_RV_SENSING_POLARITY: NORMAL
MDC_IDC_SET_LEADCHNL_RV_SENSING_SENSITIVITY: 0.6 MV
MDC_IDC_SET_ZONE_DETECTION_INTERVAL: 250 MS
MDC_IDC_SET_ZONE_DETECTION_INTERVAL: 300 MS
MDC_IDC_SET_ZONE_DETECTION_INTERVAL: 353 MS
MDC_IDC_SET_ZONE_STATUS: NORMAL
MDC_IDC_SET_ZONE_TYPE: NORMAL
MDC_IDC_SET_ZONE_VENDOR_TYPE: NORMAL
MDC_IDC_STAT_AT_BURDEN_PERCENT: 1 %
MDC_IDC_STAT_AT_DTM_END: NORMAL
MDC_IDC_STAT_AT_DTM_START: NORMAL
MDC_IDC_STAT_BRADY_DTM_END: NORMAL
MDC_IDC_STAT_BRADY_DTM_START: NORMAL
MDC_IDC_STAT_BRADY_RA_PERCENT_PACED: 2 %
MDC_IDC_STAT_BRADY_RV_PERCENT_PACED: 39 %
MDC_IDC_STAT_CRT_DTM_END: NORMAL
MDC_IDC_STAT_CRT_DTM_START: NORMAL
MDC_IDC_STAT_CRT_LV_PERCENT_PACED: 99 %
MDC_IDC_STAT_EPISODE_RECENT_COUNT: 0
MDC_IDC_STAT_EPISODE_RECENT_COUNT: 3
MDC_IDC_STAT_EPISODE_RECENT_COUNT_DTM_END: NORMAL
MDC_IDC_STAT_EPISODE_RECENT_COUNT_DTM_START: NORMAL
MDC_IDC_STAT_EPISODE_TYPE: NORMAL
MDC_IDC_STAT_EPISODE_VENDOR_TYPE: NORMAL
MDC_IDC_STAT_TACHYTHERAPY_ATP_DELIVERED_RECENT: 0
MDC_IDC_STAT_TACHYTHERAPY_ATP_DELIVERED_TOTAL: 0
MDC_IDC_STAT_TACHYTHERAPY_RECENT_DTM_END: NORMAL
MDC_IDC_STAT_TACHYTHERAPY_RECENT_DTM_START: NORMAL
MDC_IDC_STAT_TACHYTHERAPY_SHOCKS_ABORTED_RECENT: 0
MDC_IDC_STAT_TACHYTHERAPY_SHOCKS_ABORTED_TOTAL: 0
MDC_IDC_STAT_TACHYTHERAPY_SHOCKS_DELIVERED_RECENT: 0
MDC_IDC_STAT_TACHYTHERAPY_SHOCKS_DELIVERED_TOTAL: 0
MDC_IDC_STAT_TACHYTHERAPY_TOTAL_DTM_END: NORMAL
MDC_IDC_STAT_TACHYTHERAPY_TOTAL_DTM_START: NORMAL

## 2024-12-17 PROCEDURE — 93284 PRGRMG EVAL IMPLANTABLE DFB: CPT | Performed by: INTERNAL MEDICINE

## 2024-12-17 RX ORDER — SACUBITRIL AND VALSARTAN 97; 103 MG/1; MG/1
1 TABLET, FILM COATED ORAL 2 TIMES DAILY
Qty: 180 TABLET | Refills: 3 | Status: SHIPPED | OUTPATIENT
Start: 2024-12-17

## 2024-12-17 RX ORDER — METOPROLOL SUCCINATE 25 MG/1
TABLET, EXTENDED RELEASE ORAL
Qty: 360 TABLET | Refills: 3 | Status: SHIPPED | OUTPATIENT
Start: 2024-12-17
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
## 2025-01-02 ENCOUNTER — DOCUMENTATION ONLY (OUTPATIENT)
Dept: INTERNAL MEDICINE | Facility: CLINIC | Age: 80
End: 2025-01-02
Payer: COMMERCIAL

## 2025-01-02 DIAGNOSIS — R80.9 MICROALBUMINURIA: Primary | ICD-10-CM

## 2025-02-06 DIAGNOSIS — E11.8 TYPE 2 DIABETES MELLITUS WITH COMPLICATION, WITHOUT LONG-TERM CURRENT USE OF INSULIN (H): ICD-10-CM

## 2025-02-06 RX ORDER — METFORMIN HYDROCHLORIDE 500 MG/1
TABLET, EXTENDED RELEASE ORAL
Qty: 360 TABLET | Refills: 3 | Status: SHIPPED | OUTPATIENT
Start: 2025-02-06

## 2025-03-19 ENCOUNTER — ANCILLARY PROCEDURE (OUTPATIENT)
Dept: CARDIOLOGY | Facility: CLINIC | Age: 80
End: 2025-03-19
Attending: INTERNAL MEDICINE
Payer: COMMERCIAL

## 2025-03-19 DIAGNOSIS — I24.9 ACUTE CORONARY SYNDROME (H): ICD-10-CM

## 2025-03-19 DIAGNOSIS — I44.7 LEFT BUNDLE BRANCH BLOCK (LBBB): ICD-10-CM

## 2025-03-19 DIAGNOSIS — I25.5 ISCHEMIC CARDIOMYOPATHY: ICD-10-CM

## 2025-03-19 PROCEDURE — 93295 DEV INTERROG REMOTE 1/2/MLT: CPT | Performed by: INTERNAL MEDICINE

## 2025-03-19 PROCEDURE — 93296 REM INTERROG EVL PM/IDS: CPT | Performed by: INTERNAL MEDICINE

## 2025-03-31 LAB
MDC_IDC_EPISODE_DTM: NORMAL
MDC_IDC_EPISODE_ID: NORMAL
MDC_IDC_EPISODE_TYPE: NORMAL
MDC_IDC_LEAD_CONNECTION_STATUS: NORMAL
MDC_IDC_LEAD_IMPLANT_DT: NORMAL
MDC_IDC_LEAD_LOCATION: NORMAL
MDC_IDC_LEAD_LOCATION_DETAIL_1: NORMAL
MDC_IDC_LEAD_MFG: NORMAL
MDC_IDC_LEAD_MODEL: NORMAL
MDC_IDC_LEAD_POLARITY_TYPE: NORMAL
MDC_IDC_LEAD_SERIAL: NORMAL
MDC_IDC_MSMT_BATTERY_DTM: NORMAL
MDC_IDC_MSMT_BATTERY_REMAINING_LONGEVITY: 126 MO
MDC_IDC_MSMT_BATTERY_REMAINING_PERCENTAGE: 100 %
MDC_IDC_MSMT_BATTERY_STATUS: NORMAL
MDC_IDC_MSMT_CAP_CHARGE_DTM: NORMAL
MDC_IDC_MSMT_CAP_CHARGE_TIME: 10 S
MDC_IDC_MSMT_CAP_CHARGE_TYPE: NORMAL
MDC_IDC_MSMT_LEADCHNL_LV_IMPEDANCE_VALUE: 1425 OHM
MDC_IDC_MSMT_LEADCHNL_LV_PACING_THRESHOLD_AMPLITUDE: 1.1 V
MDC_IDC_MSMT_LEADCHNL_LV_PACING_THRESHOLD_PULSEWIDTH: 0.5 MS
MDC_IDC_MSMT_LEADCHNL_RA_IMPEDANCE_VALUE: 638 OHM
MDC_IDC_MSMT_LEADCHNL_RA_PACING_THRESHOLD_AMPLITUDE: 0.5 V
MDC_IDC_MSMT_LEADCHNL_RA_PACING_THRESHOLD_PULSEWIDTH: 0.4 MS
MDC_IDC_MSMT_LEADCHNL_RV_IMPEDANCE_VALUE: 508 OHM
MDC_IDC_MSMT_LEADCHNL_RV_PACING_THRESHOLD_AMPLITUDE: 1.4 V
MDC_IDC_MSMT_LEADCHNL_RV_PACING_THRESHOLD_PULSEWIDTH: 0.4 MS
MDC_IDC_PG_IMPLANT_DTM: NORMAL
MDC_IDC_PG_MFG: NORMAL
MDC_IDC_PG_MODEL: NORMAL
MDC_IDC_PG_SERIAL: NORMAL
MDC_IDC_PG_TYPE: NORMAL
MDC_IDC_SESS_CLINIC_NAME: NORMAL
MDC_IDC_SESS_DTM: NORMAL
MDC_IDC_SESS_TYPE: NORMAL
MDC_IDC_SET_BRADY_AT_MODE_SWITCH_MODE: NORMAL
MDC_IDC_SET_BRADY_AT_MODE_SWITCH_RATE: 170 {BEATS}/MIN
MDC_IDC_SET_BRADY_LOWRATE: 60 {BEATS}/MIN
MDC_IDC_SET_BRADY_MAX_SENSOR_RATE: 130 {BEATS}/MIN
MDC_IDC_SET_BRADY_MAX_TRACKING_RATE: 130 {BEATS}/MIN
MDC_IDC_SET_BRADY_MODE: NORMAL
MDC_IDC_SET_BRADY_PAV_DELAY_LOW: 180 MS
MDC_IDC_SET_BRADY_SAV_DELAY_LOW: 80 MS
MDC_IDC_SET_CRT_PACED_CHAMBERS: NORMAL
MDC_IDC_SET_LEADCHNL_LV_PACING_AMPLITUDE: 2.3 V
MDC_IDC_SET_LEADCHNL_LV_PACING_ANODE_ELECTRODE_1: NORMAL
MDC_IDC_SET_LEADCHNL_LV_PACING_ANODE_LOCATION_1: NORMAL
MDC_IDC_SET_LEADCHNL_LV_PACING_CAPTURE_MODE: NORMAL
MDC_IDC_SET_LEADCHNL_LV_PACING_CATHODE_ELECTRODE_1: NORMAL
MDC_IDC_SET_LEADCHNL_LV_PACING_CATHODE_LOCATION_1: NORMAL
MDC_IDC_SET_LEADCHNL_LV_PACING_PULSEWIDTH: 0.5 MS
MDC_IDC_SET_LEADCHNL_LV_SENSING_ADAPTATION_MODE: NORMAL
MDC_IDC_SET_LEADCHNL_LV_SENSING_ANODE_ELECTRODE_1: NORMAL
MDC_IDC_SET_LEADCHNL_LV_SENSING_ANODE_LOCATION_1: NORMAL
MDC_IDC_SET_LEADCHNL_LV_SENSING_CATHODE_ELECTRODE_1: NORMAL
MDC_IDC_SET_LEADCHNL_LV_SENSING_CATHODE_LOCATION_1: NORMAL
MDC_IDC_SET_LEADCHNL_LV_SENSING_SENSITIVITY: 1 MV
MDC_IDC_SET_LEADCHNL_RA_PACING_AMPLITUDE: 2 V
MDC_IDC_SET_LEADCHNL_RA_PACING_CAPTURE_MODE: NORMAL
MDC_IDC_SET_LEADCHNL_RA_PACING_POLARITY: NORMAL
MDC_IDC_SET_LEADCHNL_RA_PACING_PULSEWIDTH: 0.4 MS
MDC_IDC_SET_LEADCHNL_RA_SENSING_ADAPTATION_MODE: NORMAL
MDC_IDC_SET_LEADCHNL_RA_SENSING_POLARITY: NORMAL
MDC_IDC_SET_LEADCHNL_RA_SENSING_SENSITIVITY: 0.25 MV
MDC_IDC_SET_LEADCHNL_RV_PACING_AMPLITUDE: 3.4 V
MDC_IDC_SET_LEADCHNL_RV_PACING_CAPTURE_MODE: NORMAL
MDC_IDC_SET_LEADCHNL_RV_PACING_POLARITY: NORMAL
MDC_IDC_SET_LEADCHNL_RV_PACING_PULSEWIDTH: 0.4 MS
MDC_IDC_SET_LEADCHNL_RV_SENSING_ADAPTATION_MODE: NORMAL
MDC_IDC_SET_LEADCHNL_RV_SENSING_POLARITY: NORMAL
MDC_IDC_SET_LEADCHNL_RV_SENSING_SENSITIVITY: 0.6 MV
MDC_IDC_SET_ZONE_DETECTION_INTERVAL: 250 MS
MDC_IDC_SET_ZONE_DETECTION_INTERVAL: 300 MS
MDC_IDC_SET_ZONE_DETECTION_INTERVAL: 353 MS
MDC_IDC_SET_ZONE_STATUS: NORMAL
MDC_IDC_SET_ZONE_TYPE: NORMAL
MDC_IDC_SET_ZONE_VENDOR_TYPE: NORMAL
MDC_IDC_STAT_AT_BURDEN_PERCENT: 0 %
MDC_IDC_STAT_AT_DTM_END: NORMAL
MDC_IDC_STAT_AT_DTM_START: NORMAL
MDC_IDC_STAT_BRADY_DTM_END: NORMAL
MDC_IDC_STAT_BRADY_DTM_START: NORMAL
MDC_IDC_STAT_BRADY_RA_PERCENT_PACED: 7 %
MDC_IDC_STAT_BRADY_RV_PERCENT_PACED: 30 %
MDC_IDC_STAT_CRT_DTM_END: NORMAL
MDC_IDC_STAT_CRT_DTM_START: NORMAL
MDC_IDC_STAT_CRT_LV_PERCENT_PACED: 99 %
MDC_IDC_STAT_EPISODE_RECENT_COUNT: 0
MDC_IDC_STAT_EPISODE_RECENT_COUNT_DTM_END: NORMAL
MDC_IDC_STAT_EPISODE_RECENT_COUNT_DTM_START: NORMAL
MDC_IDC_STAT_EPISODE_TYPE: NORMAL
MDC_IDC_STAT_EPISODE_VENDOR_TYPE: NORMAL
MDC_IDC_STAT_TACHYTHERAPY_ATP_DELIVERED_RECENT: 0
MDC_IDC_STAT_TACHYTHERAPY_ATP_DELIVERED_TOTAL: 0
MDC_IDC_STAT_TACHYTHERAPY_RECENT_DTM_END: NORMAL
MDC_IDC_STAT_TACHYTHERAPY_RECENT_DTM_START: NORMAL
MDC_IDC_STAT_TACHYTHERAPY_SHOCKS_ABORTED_RECENT: 0
MDC_IDC_STAT_TACHYTHERAPY_SHOCKS_ABORTED_TOTAL: 0
MDC_IDC_STAT_TACHYTHERAPY_SHOCKS_DELIVERED_RECENT: 0
MDC_IDC_STAT_TACHYTHERAPY_SHOCKS_DELIVERED_TOTAL: 0
MDC_IDC_STAT_TACHYTHERAPY_TOTAL_DTM_END: NORMAL
MDC_IDC_STAT_TACHYTHERAPY_TOTAL_DTM_START: NORMAL

## 2025-05-05 ENCOUNTER — TELEPHONE (OUTPATIENT)
Dept: INTERNAL MEDICINE | Facility: CLINIC | Age: 80
End: 2025-05-05
Payer: COMMERCIAL

## 2025-05-05 NOTE — TELEPHONE ENCOUNTER
Reason for Call:  Appointment Request    Patient requesting this type of appt:  office visit    Requested provider: Alvaro Lindo    Reason patient unable to be scheduled: not within preferred provider    When does patient want to be seen/preferred time:  within this week. Not tomorrow- Pt busy    Comments:  Patient is experiencing swelling  and numbness on his feet. He is diabetic. No appts avail until end of May. Please call to disucss    Could we send this information to you in [facility] or would you prefer to receive a phone call?:   Patient would prefer a phone call   Okay to leave a detailed message?: Yes at Other phone number:  [phone]    Call taken on 5/5/2025 at 10:30 AM by RANI PRICE

## 2025-05-12 ENCOUNTER — RESULTS FOLLOW-UP (OUTPATIENT)
Dept: INTERNAL MEDICINE | Facility: CLINIC | Age: 80
End: 2025-05-12

## 2025-05-12 NOTE — LETTER
May 13, 2025      Adal Bates  [address] [address]        Dear ,    We are writing to inform you of your test results.    Slightly decreased kidney function. Stable.   Acceptable diabetic control. Try to keep diet and exercise.   Follow up in 6 months.     Resulted Orders   CBC with platelets   Result Value Ref Range    WBC Count 7.5 4.0 - 11.0 10e3/uL    RBC Count 5.00 4.40 - 5.90 10e6/uL    Hemoglobin 14.5 13.3 - 17.7 g/dL    Hematocrit 44.3 40.0 - 53.0 %    MCV 89 78 - 100 fL    MCH 29.0 26.5 - 33.0 pg    MCHC 32.7 31.5 - 36.5 g/dL    RDW 13.7 10.0 - 15.0 %    Platelet Count 195 150 - 450 10e3/uL   Comprehensive metabolic panel (BMP + Alb, Alk Phos, ALT, AST, Total. Bili, TP)   Result Value Ref Range    Sodium 139 135 - 145 mmol/L    Potassium 5.0 3.4 - 5.3 mmol/L    Carbon Dioxide (CO2) 23 22 - 29 mmol/L    Anion Gap 10 7 - 15 mmol/L    Urea Nitrogen 20.6 8.0 - 23.0 mg/dL    Creatinine 1.64 (H) 0.67 - 1.17 mg/dL    GFR Estimate 42 (L) >60 mL/min/1.73m2      Comment:      eGFR calculated using 2021 CKD-EPI equation.    Calcium 9.6 8.8 - 10.4 mg/dL    Chloride 106 98 - 107 mmol/L    Glucose 88 70 - 99 mg/dL    Alkaline Phosphatase 82 40 - 150 U/L    AST 22 0 - 45 U/L    ALT 18 0 - 70 U/L    Protein Total 7.3 6.4 - 8.3 g/dL    Albumin 4.1 3.5 - 5.2 g/dL    Bilirubin Total 0.9 <=1.2 mg/dL   Hemoglobin A1c   Result Value Ref Range    Estimated Average Glucose 177 (H) <117 mg/dL    Hemoglobin A1C 7.8 (H) 0.0 - 5.6 %      Comment:      Normal <5.7%   Prediabetes 5.7-6.4%    Diabetes 6.5% or higher     Note: Adopted from ADA consensus guidelines.   Albumin Random Urine Quantitative with Creat Ratio   Result Value Ref Range    Creatinine Urine mg/dL 45.2 mg/dL      Comment:      The reference ranges have not been established in urine creatinine. The results should be integrated into the clinical context for interpretation.    Albumin Urine mg/L 280.0 mg/L      Comment:      The reference ranges  have not been established in urine albumin. The results should be integrated into the clinical context for interpretation.    Albumin Urine mg/g Cr 619.47 (H) 0.00 - 17.00 mg/g Cr      Comment:      Microalbuminuria is defined as an albumin:creatinine ratio of 17 to 299 for males and 25 to 299 for females. A ratio of albumin:creatinine of 300 or higher is indicative of overt proteinuria.  Due to biologic variability, positive results should be confirmed by a second, first-morning random or 24-hour timed urine specimen. If there is discrepancy, a third specimen is recommended. When 2 out of 3 results are in the microalbuminuria range, this is evidence for incipient nephropathy and warrants increased efforts at glucose control, blood pressure control, and institution of therapy with an angiotensin-converting-enzyme (ACE) inhibitor (if the patient can tolerate it).         If you have any questions or concerns, please call the clinic at the number listed above.       Sincerely,      Alvaro Lindo MD    Electronically signed

## 2025-05-27 ENCOUNTER — ALLIED HEALTH/NURSE VISIT (OUTPATIENT)
Dept: NURSING | Facility: CLINIC | Age: 80
End: 2025-05-27
Payer: COMMERCIAL

## 2025-05-27 DIAGNOSIS — Z53.9 DIAGNOSIS FOR ++++ WALK IN CLINIC ++++: Primary | ICD-10-CM

## 2025-05-27 DIAGNOSIS — E11.9 TYPE 2 DIABETES, HBA1C GOAL < 8% (H): ICD-10-CM

## 2025-05-27 PROCEDURE — 99207 PR NO CHARGE NURSE ONLY: CPT

## 2025-05-27 NOTE — PROGRESS NOTES
Wife calls in stating patient only has Metformin Er 500 mg for one day. Patient's delivery through [facility] around June 3rd. Patient and wife leaving for out of town Friday June 30th.     Patient would like short term supply (1 month supply) sent to local pharmacy. Writer huddled with primary care provider who authorized okay to fill short term supply at local pharmacy for 1 month. Signed prescription.    Thank you,  Prabhu, Triage RN [place] [facility]    10:17 AM 5/27/2025

## 2025-06-30 ENCOUNTER — ANCILLARY PROCEDURE (OUTPATIENT)
Dept: CARDIOLOGY | Facility: CLINIC | Age: 80
End: 2025-06-30
Attending: INTERNAL MEDICINE
Payer: COMMERCIAL

## 2025-06-30 DIAGNOSIS — I44.7 LEFT BUNDLE BRANCH BLOCK (LBBB): ICD-10-CM

## 2025-06-30 DIAGNOSIS — I25.5 ISCHEMIC CARDIOMYOPATHY: ICD-10-CM

## 2025-06-30 DIAGNOSIS — I24.9 ACUTE CORONARY SYNDROME (H): ICD-10-CM

## 2025-06-30 PROCEDURE — 93296 REM INTERROG EVL PM/IDS: CPT | Performed by: INTERNAL MEDICINE

## 2025-06-30 PROCEDURE — 93295 DEV INTERROG REMOTE 1/2/MLT: CPT | Performed by: INTERNAL MEDICINE

## 2025-07-08 DIAGNOSIS — I50.22 CHRONIC SYSTOLIC HEART FAILURE (H): ICD-10-CM

## 2025-07-08 DIAGNOSIS — Z95.810 ICD (IMPLANTABLE CARDIOVERTER-DEFIBRILLATOR) IN PLACE: Primary | ICD-10-CM

## 2025-07-08 DIAGNOSIS — I25.5 ISCHEMIC CARDIOMYOPATHY: ICD-10-CM

## 2025-07-08 LAB
MDC_IDC_EPISODE_DTM: NORMAL
MDC_IDC_EPISODE_ID: NORMAL
MDC_IDC_EPISODE_TYPE: NORMAL
MDC_IDC_LEAD_CONNECTION_STATUS: NORMAL
MDC_IDC_LEAD_IMPLANT_DT: NORMAL
MDC_IDC_LEAD_LOCATION: NORMAL
MDC_IDC_LEAD_LOCATION_DETAIL_1: NORMAL
MDC_IDC_LEAD_MFG: NORMAL
MDC_IDC_LEAD_MODEL: NORMAL
MDC_IDC_LEAD_POLARITY_TYPE: NORMAL
MDC_IDC_LEAD_SERIAL: NORMAL
MDC_IDC_MSMT_BATTERY_DTM: NORMAL
MDC_IDC_MSMT_BATTERY_REMAINING_LONGEVITY: 126 MO
MDC_IDC_MSMT_BATTERY_REMAINING_PERCENTAGE: 100 %
MDC_IDC_MSMT_BATTERY_STATUS: NORMAL
MDC_IDC_MSMT_CAP_CHARGE_DTM: NORMAL
MDC_IDC_MSMT_CAP_CHARGE_TIME: 10.1 S
MDC_IDC_MSMT_CAP_CHARGE_TYPE: NORMAL
MDC_IDC_MSMT_LEADCHNL_LV_IMPEDANCE_VALUE: 1411 OHM
MDC_IDC_MSMT_LEADCHNL_LV_PACING_THRESHOLD_AMPLITUDE: 1.1 V
MDC_IDC_MSMT_LEADCHNL_LV_PACING_THRESHOLD_PULSEWIDTH: 0.5 MS
MDC_IDC_MSMT_LEADCHNL_RA_IMPEDANCE_VALUE: 506 OHM
MDC_IDC_MSMT_LEADCHNL_RA_PACING_THRESHOLD_AMPLITUDE: 0.5 V
MDC_IDC_MSMT_LEADCHNL_RA_PACING_THRESHOLD_PULSEWIDTH: 0.4 MS
MDC_IDC_MSMT_LEADCHNL_RV_IMPEDANCE_VALUE: 496 OHM
MDC_IDC_MSMT_LEADCHNL_RV_PACING_THRESHOLD_AMPLITUDE: 1.4 V
MDC_IDC_MSMT_LEADCHNL_RV_PACING_THRESHOLD_PULSEWIDTH: 0.4 MS
MDC_IDC_PG_IMPLANT_DTM: NORMAL
MDC_IDC_PG_MFG: NORMAL
MDC_IDC_PG_MODEL: NORMAL
MDC_IDC_PG_SERIAL: NORMAL
MDC_IDC_PG_TYPE: NORMAL
MDC_IDC_SESS_CLINIC_NAME: NORMAL
MDC_IDC_SESS_DTM: NORMAL
MDC_IDC_SESS_TYPE: NORMAL
MDC_IDC_SET_BRADY_AT_MODE_SWITCH_MODE: NORMAL
MDC_IDC_SET_BRADY_AT_MODE_SWITCH_RATE: 170 {BEATS}/MIN
MDC_IDC_SET_BRADY_LOWRATE: 60 {BEATS}/MIN
MDC_IDC_SET_BRADY_MAX_SENSOR_RATE: 130 {BEATS}/MIN
MDC_IDC_SET_BRADY_MAX_TRACKING_RATE: 130 {BEATS}/MIN
MDC_IDC_SET_BRADY_MODE: NORMAL
MDC_IDC_SET_BRADY_PAV_DELAY_LOW: 180 MS
MDC_IDC_SET_BRADY_SAV_DELAY_LOW: 80 MS
MDC_IDC_SET_CRT_PACED_CHAMBERS: NORMAL
MDC_IDC_SET_LEADCHNL_LV_PACING_AMPLITUDE: 2.4 V
MDC_IDC_SET_LEADCHNL_LV_PACING_ANODE_ELECTRODE_1: NORMAL
MDC_IDC_SET_LEADCHNL_LV_PACING_ANODE_LOCATION_1: NORMAL
MDC_IDC_SET_LEADCHNL_LV_PACING_CAPTURE_MODE: NORMAL
MDC_IDC_SET_LEADCHNL_LV_PACING_CATHODE_ELECTRODE_1: NORMAL
MDC_IDC_SET_LEADCHNL_LV_PACING_CATHODE_LOCATION_1: NORMAL
MDC_IDC_SET_LEADCHNL_LV_PACING_PULSEWIDTH: 0.5 MS
MDC_IDC_SET_LEADCHNL_LV_SENSING_ADAPTATION_MODE: NORMAL
MDC_IDC_SET_LEADCHNL_LV_SENSING_ANODE_ELECTRODE_1: NORMAL
MDC_IDC_SET_LEADCHNL_LV_SENSING_ANODE_LOCATION_1: NORMAL
MDC_IDC_SET_LEADCHNL_LV_SENSING_CATHODE_ELECTRODE_1: NORMAL
MDC_IDC_SET_LEADCHNL_LV_SENSING_CATHODE_LOCATION_1: NORMAL
MDC_IDC_SET_LEADCHNL_LV_SENSING_SENSITIVITY: 1 MV
MDC_IDC_SET_LEADCHNL_RA_PACING_AMPLITUDE: 2 V
MDC_IDC_SET_LEADCHNL_RA_PACING_CAPTURE_MODE: NORMAL
MDC_IDC_SET_LEADCHNL_RA_PACING_POLARITY: NORMAL
MDC_IDC_SET_LEADCHNL_RA_PACING_PULSEWIDTH: 0.4 MS
MDC_IDC_SET_LEADCHNL_RA_SENSING_ADAPTATION_MODE: NORMAL
MDC_IDC_SET_LEADCHNL_RA_SENSING_POLARITY: NORMAL
MDC_IDC_SET_LEADCHNL_RA_SENSING_SENSITIVITY: 0.25 MV
MDC_IDC_SET_LEADCHNL_RV_PACING_AMPLITUDE: 3.4 V
MDC_IDC_SET_LEADCHNL_RV_PACING_CAPTURE_MODE: NORMAL
MDC_IDC_SET_LEADCHNL_RV_PACING_POLARITY: NORMAL
MDC_IDC_SET_LEADCHNL_RV_PACING_PULSEWIDTH: 0.4 MS
MDC_IDC_SET_LEADCHNL_RV_SENSING_ADAPTATION_MODE: NORMAL
MDC_IDC_SET_LEADCHNL_RV_SENSING_POLARITY: NORMAL
MDC_IDC_SET_LEADCHNL_RV_SENSING_SENSITIVITY: 0.6 MV
MDC_IDC_SET_ZONE_DETECTION_INTERVAL: 250 MS
MDC_IDC_SET_ZONE_DETECTION_INTERVAL: 300 MS
MDC_IDC_SET_ZONE_DETECTION_INTERVAL: 353 MS
MDC_IDC_SET_ZONE_STATUS: NORMAL
MDC_IDC_SET_ZONE_TYPE: NORMAL
MDC_IDC_SET_ZONE_VENDOR_TYPE: NORMAL
MDC_IDC_STAT_AT_BURDEN_PERCENT: 0 %
MDC_IDC_STAT_AT_DTM_END: NORMAL
MDC_IDC_STAT_AT_DTM_START: NORMAL
MDC_IDC_STAT_BRADY_DTM_END: NORMAL
MDC_IDC_STAT_BRADY_DTM_START: NORMAL
MDC_IDC_STAT_BRADY_RA_PERCENT_PACED: 6 %
MDC_IDC_STAT_BRADY_RV_PERCENT_PACED: 39 %
MDC_IDC_STAT_CRT_DTM_END: NORMAL
MDC_IDC_STAT_CRT_DTM_START: NORMAL
MDC_IDC_STAT_CRT_LV_PERCENT_PACED: 99 %
MDC_IDC_STAT_EPISODE_RECENT_COUNT: 0
MDC_IDC_STAT_EPISODE_RECENT_COUNT_DTM_END: NORMAL
MDC_IDC_STAT_EPISODE_RECENT_COUNT_DTM_START: NORMAL
MDC_IDC_STAT_EPISODE_TYPE: NORMAL
MDC_IDC_STAT_EPISODE_VENDOR_TYPE: NORMAL
MDC_IDC_STAT_TACHYTHERAPY_ATP_DELIVERED_RECENT: 0
MDC_IDC_STAT_TACHYTHERAPY_ATP_DELIVERED_TOTAL: 0
MDC_IDC_STAT_TACHYTHERAPY_RECENT_DTM_END: NORMAL
MDC_IDC_STAT_TACHYTHERAPY_RECENT_DTM_START: NORMAL
MDC_IDC_STAT_TACHYTHERAPY_SHOCKS_ABORTED_RECENT: 0
MDC_IDC_STAT_TACHYTHERAPY_SHOCKS_ABORTED_TOTAL: 0
MDC_IDC_STAT_TACHYTHERAPY_SHOCKS_DELIVERED_RECENT: 0
MDC_IDC_STAT_TACHYTHERAPY_SHOCKS_DELIVERED_TOTAL: 0
MDC_IDC_STAT_TACHYTHERAPY_TOTAL_DTM_END: NORMAL
MDC_IDC_STAT_TACHYTHERAPY_TOTAL_DTM_START: NORMAL

## 2025-07-17 DIAGNOSIS — E11.8 TYPE 2 DIABETES MELLITUS WITH COMPLICATION, WITHOUT LONG-TERM CURRENT USE OF INSULIN (H): ICD-10-CM

## 2025-07-17 DIAGNOSIS — I25.5 ISCHEMIC CARDIOMYOPATHY: ICD-10-CM

## 2025-07-17 DIAGNOSIS — I50.43 ACUTE ON CHRONIC COMBINED SYSTOLIC (CONGESTIVE) AND DIASTOLIC (CONGESTIVE) HEART FAILURE (H): ICD-10-CM

## 2025-07-17 RX ORDER — SPIRONOLACTONE 25 MG/1
25 TABLET ORAL DAILY
Qty: 90 TABLET | Refills: 0 | Status: SHIPPED | OUTPATIENT
Start: 2025-07-17

## 2025-07-17 RX ORDER — GLIPIZIDE 10 MG/1
10 TABLET, FILM COATED, EXTENDED RELEASE ORAL EVERY MORNING
Qty: 90 TABLET | Refills: 3 | OUTPATIENT
Start: 2025-07-17

## 2025-07-17 NOTE — TELEPHONE ENCOUNTER
Clinic RN: Please investigate patient's chart or contact patient if the information cannot be found because patient should have run out of this medication on 3/2025. Confirm patient is taking this medication as prescribed. Document findings and route refill encounter to provider for approval or denial.    Mayra Fuentes, RN on 7/17/2025 at 9:43 AM

## 2025-07-17 NOTE — TELEPHONE ENCOUNTER
Called and spoke with spouse(ok per ctc) and she stated patient has been taking medication daily and had some left over from another prescription.  Patient needing prescription filled.

## (undated) DEVICE — DEFIB PRO-PADZ LVP LQD GEL ADULT 8900-2105-01

## (undated) DEVICE — LINEN FULL SHEET 5511

## (undated) DEVICE — DECANTER VIAL 2006S

## (undated) DEVICE — PACK MINOR CUSTOM RIDGES SBA32RMRMA

## (undated) DEVICE — LINEN HALF SHEET 5512

## (undated) DEVICE — SU VICRYL 0 TIE 12X18" J906G

## (undated) DEVICE — SHEATH PRELUDE SNAP 13CM 6FR

## (undated) DEVICE — CATH EP 60CM 5FR 2-8-2MM SPC-

## (undated) DEVICE — LINE MONITOR NASAL SMART CAPNOLINE ADULT LONG 12463

## (undated) DEVICE — DRAPE LAP W/ARMBOARD 29410

## (undated) DEVICE — GOWN XLG DISP 9545

## (undated) DEVICE — INTRO SFSHEATH WORLEY 40CM 9FR S CSGWORLEY109M

## (undated) DEVICE — Device

## (undated) DEVICE — TUBING SUCTION 12"X1/4" N612

## (undated) DEVICE — KIT ENDO TURNOVER/PROCEDURE W/CLEAN A SCOPE LINERS 103888

## (undated) DEVICE — ENDO SNARE POLYPECTOMY OVAL 15MM LOOP SD-240U-15

## (undated) DEVICE — ENDO TRAP POLYP QUICK CATCH 710201

## (undated) DEVICE — DRSG STERI STRIP 1/2X4" R1547

## (undated) DEVICE — SU MONOCRYL 4-0 P-3 18" UND Y494G

## (undated) DEVICE — LINEN TOWEL PACK X10 5473

## (undated) DEVICE — NDL 18GA 1.5" 305196

## (undated) DEVICE — BLADE CLIPPER SGL USE 9680

## (undated) DEVICE — ESU GROUND PAD ADULT W/CORD E7507

## (undated) DEVICE — SOL NACL 0.9% 20ML VIAL

## (undated) DEVICE — SU VICRYL 0 CT-2 27" J334H

## (undated) DEVICE — GLOVE PROTEXIS BLUE W/NEU-THERA 7.0  2D73EB70

## (undated) DEVICE — SUCTION CANISTER MEDIVAC LINER 3000ML W/LID 65651-530

## (undated) DEVICE — GUIDEWIRE VASC 182CM .014IN CHC PT I H7491216101J2

## (undated) DEVICE — CABLE PACING ALLIGATOR CLIP 301-CG

## (undated) DEVICE — GLOVE PROTEXIS W/NEU-THERA 7.0  2D73TE70

## (undated) DEVICE — SHEATH PRELUDE SNAP 13CM 9FR

## (undated) DEVICE — SPONGE LAP 4X18" X8415

## (undated) DEVICE — BAG CLEAR TRASH 1.3M 39X33" P4040C

## (undated) DEVICE — LINEN TOWEL PACK X5 5464

## (undated) DEVICE — PREP CHLORAPREP 26ML TINTED ORANGE  260815

## (undated) DEVICE — SOL NACL 0.9% IRRIG 1000ML BOTTLE 2F7124

## (undated) DEVICE — PACK PCMKR PERM SRG PROC LF SAN32PC573

## (undated) DEVICE — GLOVE PROTEXIS POWDER FREE 6.5 ORTHOPEDIC 2D73ET65

## (undated) RX ORDER — ALBUTEROL SULFATE 90 UG/1
AEROSOL, METERED RESPIRATORY (INHALATION)
Status: DISPENSED
Start: 2020-03-21

## (undated) RX ORDER — GLYCOPYRROLATE 0.2 MG/ML
INJECTION INTRAMUSCULAR; INTRAVENOUS
Status: DISPENSED
Start: 2020-03-21

## (undated) RX ORDER — PHENYLEPHRINE HCL IN 0.9% NACL 1 MG/10 ML
SYRINGE (ML) INTRAVENOUS
Status: DISPENSED
Start: 2020-03-21

## (undated) RX ORDER — OXYCODONE HYDROCHLORIDE 5 MG/1
TABLET ORAL
Status: DISPENSED
Start: 2020-03-21

## (undated) RX ORDER — FENTANYL CITRATE 50 UG/ML
INJECTION, SOLUTION INTRAMUSCULAR; INTRAVENOUS
Status: DISPENSED
Start: 2019-12-10

## (undated) RX ORDER — ACETAMINOPHEN 325 MG/1
TABLET ORAL
Status: DISPENSED
Start: 2021-12-27

## (undated) RX ORDER — HEPARIN SODIUM 1000 [USP'U]/ML
INJECTION, SOLUTION INTRAVENOUS; SUBCUTANEOUS
Status: DISPENSED
Start: 2021-12-27

## (undated) RX ORDER — FENTANYL CITRATE 50 UG/ML
INJECTION, SOLUTION INTRAMUSCULAR; INTRAVENOUS
Status: DISPENSED
Start: 2020-03-21

## (undated) RX ORDER — FENTANYL CITRATE 50 UG/ML
INJECTION, SOLUTION INTRAMUSCULAR; INTRAVENOUS
Status: DISPENSED
Start: 2021-12-27

## (undated) RX ORDER — BUPIVACAINE HYDROCHLORIDE 2.5 MG/ML
INJECTION, SOLUTION EPIDURAL; INFILTRATION; INTRACAUDAL
Status: DISPENSED
Start: 2020-03-21

## (undated) RX ORDER — BUPIVACAINE HYDROCHLORIDE 2.5 MG/ML
INJECTION, SOLUTION EPIDURAL; INFILTRATION; INTRACAUDAL
Status: DISPENSED
Start: 2021-12-27

## (undated) RX ORDER — LIDOCAINE HYDROCHLORIDE 10 MG/ML
INJECTION, SOLUTION EPIDURAL; INFILTRATION; INTRACAUDAL; PERINEURAL
Status: DISPENSED
Start: 2021-12-27

## (undated) RX ORDER — HYDRALAZINE HYDROCHLORIDE 20 MG/ML
INJECTION INTRAMUSCULAR; INTRAVENOUS
Status: DISPENSED
Start: 2020-03-21

## (undated) RX ORDER — ONDANSETRON 2 MG/ML
INJECTION INTRAMUSCULAR; INTRAVENOUS
Status: DISPENSED
Start: 2020-03-21

## (undated) RX ORDER — NEOSTIGMINE METHYLSULFATE 1 MG/ML
VIAL (ML) INJECTION
Status: DISPENSED
Start: 2020-03-21

## (undated) RX ORDER — LIDOCAINE HYDROCHLORIDE 10 MG/ML
INJECTION, SOLUTION EPIDURAL; INFILTRATION; INTRACAUDAL; PERINEURAL
Status: DISPENSED
Start: 2020-03-21

## (undated) RX ORDER — DEXAMETHASONE SODIUM PHOSPHATE 4 MG/ML
INJECTION, SOLUTION INTRA-ARTICULAR; INTRALESIONAL; INTRAMUSCULAR; INTRAVENOUS; SOFT TISSUE
Status: DISPENSED
Start: 2020-03-21

## (undated) RX ORDER — PROPOFOL 10 MG/ML
INJECTION, EMULSION INTRAVENOUS
Status: DISPENSED
Start: 2020-03-21

## (undated) RX ORDER — CEFAZOLIN SODIUM 1 G/3ML
INJECTION, POWDER, FOR SOLUTION INTRAMUSCULAR; INTRAVENOUS
Status: DISPENSED
Start: 2020-03-21

## (undated) RX ORDER — CEFAZOLIN SODIUM 2 G/100ML
INJECTION, SOLUTION INTRAVENOUS
Status: DISPENSED
Start: 2021-12-27